# Patient Record
Sex: FEMALE | Race: WHITE | Employment: FULL TIME | URBAN - METROPOLITAN AREA
[De-identification: names, ages, dates, MRNs, and addresses within clinical notes are randomized per-mention and may not be internally consistent; named-entity substitution may affect disease eponyms.]

---

## 2017-05-05 ENCOUNTER — HOSPITAL ENCOUNTER (OUTPATIENT)
Dept: MAMMOGRAPHY | Age: 47
Discharge: HOME | End: 2017-05-05
Attending: FAMILY MEDICINE | Admitting: FAMILY MEDICINE

## 2017-05-05 DIAGNOSIS — Z12.31 VISIT FOR SCREENING MAMMOGRAM: ICD-10-CM

## 2017-12-19 ENCOUNTER — HOSPITAL ENCOUNTER (OUTPATIENT)
Dept: OTHER | Age: 47
Discharge: HOME | End: 2017-12-19

## 2018-09-25 ENCOUNTER — HOSPITAL ENCOUNTER (OUTPATIENT)
Age: 48
Setting detail: SPECIMEN
Discharge: HOME | End: 2018-09-25

## 2018-09-25 PROCEDURE — 86735 MUMPS ANTIBODY: CPT

## 2018-09-25 PROCEDURE — 86787 VARICELLA-ZOSTER ANTIBODY: CPT

## 2018-09-25 PROCEDURE — 86765 RUBEOLA ANTIBODY: CPT

## 2018-09-25 PROCEDURE — 86481 TB AG RESPONSE T-CELL SUSP: CPT

## 2018-09-25 PROCEDURE — 86762 RUBELLA ANTIBODY: CPT

## 2018-09-27 LAB
MUV IGG SER QL: 16.9
RUBELLA ANTIBODY IGG: 16.3
RUBEOLA (MEASLES) AB IGG: 98.5
VARICELLA-ZOSTER VIRUS AB, IGG: 1784

## 2018-09-28 LAB
NIL (NEGATIVE) SPOT CONTROL: NORMAL
PANEL A SPOT COUNT: 0
PANEL B SPOT COUNT: 2
POSITIVE CONTROL SPOT COUNT: NORMAL
TB CELL IMMUNE MEASURE: NEGATIVE

## 2019-03-12 ENCOUNTER — EMPLOYEE WELLNESS (OUTPATIENT)
Dept: OTHER | Age: 49
End: 2019-03-12

## 2019-03-12 LAB
CHOLESTEROL: 202 MG/DL
GLUCOSE BLD-MCNC: 120 MG/DL (ref 70–99)
HDLC SERPL-MCNC: 51 MG/DL
LDL CHOLESTEROL CALCULATED: 133 MG/DL
PATIENT FASTING?: YES
TRIGL SERPL-MCNC: 91 MG/DL

## 2019-03-20 VITALS — WEIGHT: 231 LBS | BODY MASS INDEX: 36.18 KG/M2

## 2019-04-18 ENCOUNTER — OFFICE VISIT (OUTPATIENT)
Dept: FAMILY MEDICINE CLINIC | Age: 49
End: 2019-04-18
Payer: COMMERCIAL

## 2019-04-18 VITALS
RESPIRATION RATE: 14 BRPM | HEART RATE: 50 BPM | DIASTOLIC BLOOD PRESSURE: 74 MMHG | BODY MASS INDEX: 37.93 KG/M2 | WEIGHT: 236 LBS | HEIGHT: 66 IN | SYSTOLIC BLOOD PRESSURE: 112 MMHG

## 2019-04-18 DIAGNOSIS — Z13.220 LIPID SCREENING: ICD-10-CM

## 2019-04-18 DIAGNOSIS — Z11.4 ENCOUNTER FOR SCREENING FOR HIV: ICD-10-CM

## 2019-04-18 DIAGNOSIS — Z12.11 SCREENING FOR COLON CANCER: ICD-10-CM

## 2019-04-18 DIAGNOSIS — Z00.00 ROUTINE HEALTH MAINTENANCE: ICD-10-CM

## 2019-04-18 DIAGNOSIS — Z00.00 ROUTINE HEALTH MAINTENANCE: Primary | ICD-10-CM

## 2019-04-18 DIAGNOSIS — Z23 NEED FOR PROPHYLACTIC VACCINATION AGAINST DIPHTHERIA-TETANUS-PERTUSSIS (DTP): ICD-10-CM

## 2019-04-18 DIAGNOSIS — R73.03 PRE-DIABETES: ICD-10-CM

## 2019-04-18 DIAGNOSIS — I10 ESSENTIAL HYPERTENSION: ICD-10-CM

## 2019-04-18 DIAGNOSIS — Z12.31 ENCOUNTER FOR SCREENING MAMMOGRAM FOR BREAST CANCER: ICD-10-CM

## 2019-04-18 LAB
A/G RATIO: 1.4 (ref 1.1–2.2)
ALBUMIN SERPL-MCNC: 4.2 G/DL (ref 3.4–5)
ALP BLD-CCNC: 131 U/L (ref 40–129)
ALT SERPL-CCNC: 18 U/L (ref 10–40)
ANION GAP SERPL CALCULATED.3IONS-SCNC: 11 MMOL/L (ref 3–16)
AST SERPL-CCNC: 21 U/L (ref 15–37)
BILIRUB SERPL-MCNC: 0.3 MG/DL (ref 0–1)
BUN BLDV-MCNC: 28 MG/DL (ref 7–20)
CALCIUM SERPL-MCNC: 9.8 MG/DL (ref 8.3–10.6)
CHLORIDE BLD-SCNC: 100 MMOL/L (ref 99–110)
CHOLESTEROL, TOTAL: 207 MG/DL (ref 0–199)
CO2: 31 MMOL/L (ref 21–32)
CREAT SERPL-MCNC: 0.8 MG/DL (ref 0.6–1.1)
CREATININE URINE POCT: 100
GFR AFRICAN AMERICAN: >60
GFR NON-AFRICAN AMERICAN: >60
GLOBULIN: 3 G/DL
GLUCOSE BLD-MCNC: 108 MG/DL (ref 70–99)
HBA1C MFR BLD: 5.7 %
HCT VFR BLD CALC: 42.6 % (ref 36–48)
HDLC SERPL-MCNC: 47 MG/DL (ref 40–60)
HEMOGLOBIN: 14.5 G/DL (ref 12–16)
LDL CHOLESTEROL CALCULATED: 106 MG/DL
MCH RBC QN AUTO: 30 PG (ref 26–34)
MCHC RBC AUTO-ENTMCNC: 34.1 G/DL (ref 31–36)
MCV RBC AUTO: 88.2 FL (ref 80–100)
MICROALBUMIN/CREAT 24H UR: 10 MG/G{CREAT}
MICROALBUMIN/CREAT UR-RTO: 30
PDW BLD-RTO: 12.9 % (ref 12.4–15.4)
PLATELET # BLD: 254 K/UL (ref 135–450)
PMV BLD AUTO: 9.4 FL (ref 5–10.5)
POTASSIUM SERPL-SCNC: 4.4 MMOL/L (ref 3.5–5.1)
RBC # BLD: 4.83 M/UL (ref 4–5.2)
SODIUM BLD-SCNC: 142 MMOL/L (ref 136–145)
TOTAL PROTEIN: 7.2 G/DL (ref 6.4–8.2)
TRIGL SERPL-MCNC: 269 MG/DL (ref 0–150)
VLDLC SERPL CALC-MCNC: 54 MG/DL
WBC # BLD: 9.6 K/UL (ref 4–11)

## 2019-04-18 PROCEDURE — 82044 UR ALBUMIN SEMIQUANTITATIVE: CPT | Performed by: NURSE PRACTITIONER

## 2019-04-18 PROCEDURE — 99202 OFFICE O/P NEW SF 15 MIN: CPT | Performed by: NURSE PRACTITIONER

## 2019-04-18 PROCEDURE — 83036 HEMOGLOBIN GLYCOSYLATED A1C: CPT | Performed by: NURSE PRACTITIONER

## 2019-04-18 RX ORDER — ATENOLOL 50 MG/1
50 TABLET ORAL DAILY
Qty: 30 TABLET | Refills: 5 | Status: SHIPPED | OUTPATIENT
Start: 2019-04-18 | End: 2019-07-18 | Stop reason: SINTOL

## 2019-04-18 RX ORDER — HYDROCHLOROTHIAZIDE 50 MG/1
50 TABLET ORAL DAILY
Qty: 30 TABLET | Refills: 5 | Status: SHIPPED | OUTPATIENT
Start: 2019-04-18 | End: 2019-07-18 | Stop reason: SDUPTHER

## 2019-04-18 ASSESSMENT — ENCOUNTER SYMPTOMS
WHEEZING: 0
CHEST TIGHTNESS: 0
CONSTIPATION: 0
COUGH: 0
SHORTNESS OF BREATH: 0
DIARRHEA: 0
ABDOMINAL PAIN: 0
NAUSEA: 0
VOMITING: 0

## 2019-04-18 ASSESSMENT — PATIENT HEALTH QUESTIONNAIRE - PHQ9
2. FEELING DOWN, DEPRESSED OR HOPELESS: 1
SUM OF ALL RESPONSES TO PHQ QUESTIONS 1-9: 2
1. LITTLE INTEREST OR PLEASURE IN DOING THINGS: 1
SUM OF ALL RESPONSES TO PHQ9 QUESTIONS 1 & 2: 2
SUM OF ALL RESPONSES TO PHQ QUESTIONS 1-9: 2

## 2019-04-18 NOTE — PROGRESS NOTES
SUBJECTIVE:    Erick Dias  1970  46 y.o.  female      Chief Complaint   Patient presents with    New Patient     establish care, general wellness, diabetic wants more conversation in general regarding diabetes, no issues, has lost approx. 90lbs over several years    Medication Refill    Diabetes    Discuss Medications     cut atenolol in 1/2; taking 50mg instead of 100mg     HPI    Allergies   Allergen Reactions    Pcn [Penicillins] Hives     Any derivative       Current Outpatient Medications   Medication Sig Dispense Refill    atenolol (TENORMIN) 50 MG tablet Take 1 tablet by mouth daily 30 tablet 5    hydrochlorothiazide (HYDRODIURIL) 50 MG tablet Take 1 tablet by mouth daily 30 tablet 5     No current facility-administered medications for this visit.            Past Medical History:   Diagnosis Date    Diabetes mellitus ([facility]     Graves disease     Hypertension     Hyperthyroidism     Migraine     Type 2 diabetes mellitus without complication ([facility]      Past Surgical History:   Procedure Laterality Date     SECTION      HYSTERECTOMY  2015    bilateral ovaries not removed    HYSTERECTOMY, VAGINAL      LAMINECTOMY      TONSILLECTOMY  1990s     Social History     Socioeconomic History    Marital status:      Spouse name: None    Number of children: None    Years of education: None    Highest education level: None   Occupational History    None   Social Needs    Financial resource strain: None    Food insecurity:     Worry: None     Inability: None    Transportation needs:     Medical: None     Non-medical: None   Tobacco Use    Smoking status: Former Smoker     Packs/day: 1.00     Years: 20.00     Pack years: 20.00     Types: Cigarettes     Last attempt to quit: 2007     Years since quittin.3    Smokeless tobacco: Never Used   Substance and Sexual Activity    Alcohol use: No    Drug use: No    Sexual activity: Yes     Partners: Male   Lifestyle Ear: Hearing, tympanic membrane, external ear and ear canal normal.   Nose: Nose normal.   Mouth/Throat: Uvula is midline, oropharynx is clear and moist and mucous membranes are normal.   Eyes: Pupils are equal, round, and reactive to light. Conjunctivae and EOM are normal.   Neck: Normal range of motion. Neck supple. Cardiovascular: Normal rate, regular rhythm and normal heart sounds. Exam reveals no gallop and no friction rub. No murmur heard. Pulmonary/Chest: Effort normal and breath sounds normal. No respiratory distress. She has no wheezes. She has no rhonchi. She has no rales. Abdominal: Soft. Bowel sounds are normal. She exhibits no distension. There is no tenderness. There is no rigidity and no guarding. Musculoskeletal: Normal range of motion. She exhibits no edema. Lymphadenopathy:     She has no cervical adenopathy. Right cervical: No superficial cervical and no posterior cervical adenopathy present. Left cervical: No superficial cervical and no posterior cervical adenopathy present. Neurological: She is alert and oriented to person, place, and time. No cranial nerve deficit. Skin: Skin is warm and dry. Psychiatric: She has a normal mood and affect. Her behavior is normal.       ASSESSMENT/PLAN:    1. Encounter for screening mammogram for breast cancer  - [facility] Digital Screen Bilateral [[id number]]; Future    2. Screening for colon cancer  - POCT FECAL IMMUNOCHEMICAL TEST (FIT); Future  - Natalia Trujillo MD, General Surgery, [facility]    3. Need for prophylactic vaccination against diphtheria-tetanus-pertussis (DTP)    4. Pre-diabetes  The patient is asked to make an attempt to improve diet and exercise patterns to aid in medical management of this problem. - POCT glycosylated hemoglobin (Hb A1C)  - POCT microalbumin    5. Essential hypertension  Monitor BP periodically at home. Medications refilled  - atenolol (TENORMIN) 50 MG tablet;  Take 1 tablet by mouth daily Dispense: 30 tablet; Refill: 5  - hydrochlorothiazide (HYDRODIURIL) 50 MG tablet; Take 1 tablet by mouth daily  Dispense: 30 tablet; Refill: 5    6. Routine health maintenance  - CBC; Future  - Comprehensive Metabolic Panel; Future    7. Lipid screening  - Lipid Panel; Future    8. Encounter for screening for HIV  - HIV Screen; Future               Return in about 3 months (around 7/18/2019).       (Please note that portions of this note may have been completed with a voice recognition program. Efforts were made to edit the dictations but occasionally words aremis-transcribed.)

## 2019-04-18 NOTE — ASSESSMENT & PLAN NOTE
Last A1c 6.3%. A1c today is 5.7. Denies polyuria, polydipsia, polyphagia. She has lost almost 90 pounds in the last year.

## 2019-04-18 NOTE — ASSESSMENT & PLAN NOTE
Started on medication 9 years ago. She was on a higher dose of atenolol, but heart rate dropping too low. She cut in half and has felt better. She checks BP at home \"when I feel funky\". BP typically in 120s/70-80s.

## 2019-04-19 ENCOUNTER — TELEPHONE (OUTPATIENT)
Dept: BARIATRICS/WEIGHT MGMT | Age: 49
End: 2019-04-19

## 2019-04-19 DIAGNOSIS — R74.8 ALKALINE PHOSPHATASE ELEVATION: ICD-10-CM

## 2019-04-19 DIAGNOSIS — R74.8 ALKALINE PHOSPHATASE ELEVATION: Primary | ICD-10-CM

## 2019-04-19 LAB
HIV AG/AB: NORMAL
HIV ANTIGEN: NORMAL
HIV-1 ANTIBODY: NORMAL
HIV-2 AB: NORMAL

## 2019-04-24 LAB
ALK PHOS OTHER CALC: 0 U/L
ALK PHOSPHATASE: 121 U/L (ref 40–120)
ALKALINE PHOSPHATASE BONE FRACTION: 41 U/L (ref 0–55)
ALKALINE PHOSPHATASE LIVER FRACTION: 80 U/L (ref 0–94)

## 2019-04-25 DIAGNOSIS — R74.8 ELEVATED ALKALINE PHOSPHATASE LEVEL: Primary | ICD-10-CM

## 2019-05-16 DIAGNOSIS — R74.8 ELEVATED ALKALINE PHOSPHATASE LEVEL: ICD-10-CM

## 2019-05-16 DIAGNOSIS — R74.8 ELEVATED ALKALINE PHOSPHATASE LEVEL: Primary | ICD-10-CM

## 2019-05-19 LAB
ALK PHOS OTHER CALC: 0 U/L
ALK PHOSPHATASE: 120 U/L (ref 40–120)
ALKALINE PHOSPHATASE BONE FRACTION: 40 U/L (ref 0–55)
ALKALINE PHOSPHATASE LIVER FRACTION: 80 U/L (ref 0–94)

## 2019-05-20 DIAGNOSIS — Z12.11 SCREENING FOR COLON CANCER: ICD-10-CM

## 2019-05-20 LAB
CONTROL: NORMAL
HEMOCCULT STL QL: NEGATIVE

## 2019-05-20 PROCEDURE — 82274 ASSAY TEST FOR BLOOD FECAL: CPT | Performed by: NURSE PRACTITIONER

## 2019-05-30 ENCOUNTER — NURSE TRIAGE (OUTPATIENT)
Dept: OTHER | Facility: CLINIC | Age: 49
End: 2019-05-30

## 2019-05-30 ENCOUNTER — HOSPITAL ENCOUNTER (EMERGENCY)
Age: 49
Discharge: ANOTHER HOSPITAL | End: 2019-05-30
Attending: EMERGENCY MEDICINE
Payer: COMMERCIAL

## 2019-05-30 ENCOUNTER — APPOINTMENT (OUTPATIENT)
Dept: CT IMAGING | Age: 49
End: 2019-05-30
Payer: COMMERCIAL

## 2019-05-30 VITALS
HEIGHT: 66 IN | SYSTOLIC BLOOD PRESSURE: 119 MMHG | RESPIRATION RATE: 18 BRPM | BODY MASS INDEX: 36.96 KG/M2 | WEIGHT: 230 LBS | HEART RATE: 52 BPM | TEMPERATURE: 97.2 F | OXYGEN SATURATION: 100 % | DIASTOLIC BLOOD PRESSURE: 69 MMHG

## 2019-05-30 DIAGNOSIS — R10.9 ABDOMINAL PAIN, UNSPECIFIED ABDOMINAL LOCATION: ICD-10-CM

## 2019-05-30 DIAGNOSIS — K35.80 ACUTE APPENDICITIS, UNSPECIFIED ACUTE APPENDICITIS TYPE: Primary | ICD-10-CM

## 2019-05-30 LAB
ALBUMIN SERPL-MCNC: 4 GM/DL (ref 3.4–5)
ALP BLD-CCNC: 143 IU/L (ref 40–129)
ALT SERPL-CCNC: 6 U/L (ref 10–40)
ANION GAP SERPL CALCULATED.3IONS-SCNC: 20 MMOL/L (ref 4–16)
AST SERPL-CCNC: 17 IU/L (ref 15–37)
BACTERIA: ABNORMAL /HPF
BASOPHILS ABSOLUTE: 0.1 K/CU MM
BASOPHILS RELATIVE PERCENT: 0.4 % (ref 0–1)
BILIRUB SERPL-MCNC: 0.3 MG/DL (ref 0–1)
BILIRUBIN URINE: NEGATIVE MG/DL
BLOOD, URINE: NEGATIVE
BUN BLDV-MCNC: 22 MG/DL (ref 6–23)
CALCIUM SERPL-MCNC: 9.4 MG/DL (ref 8.3–10.6)
CAST TYPE: ABNORMAL /HPF
CHLORIDE BLD-SCNC: 103 MMOL/L (ref 99–110)
CLARITY: CLEAR
CO2: 20 MMOL/L (ref 21–32)
COLOR: YELLOW
CREAT SERPL-MCNC: 0.7 MG/DL (ref 0.6–1.1)
CRYSTAL TYPE: ABNORMAL /HPF
DIFFERENTIAL TYPE: ABNORMAL
EOSINOPHILS ABSOLUTE: 0.6 K/CU MM
EOSINOPHILS RELATIVE PERCENT: 4.4 % (ref 0–3)
EPITHELIAL CELLS, UA: ABNORMAL /HPF
GFR AFRICAN AMERICAN: >60 ML/MIN/1.73M2
GFR NON-AFRICAN AMERICAN: >60 ML/MIN/1.73M2
GLUCOSE BLD-MCNC: 98 MG/DL (ref 70–99)
GLUCOSE, URINE: NEGATIVE MG/DL
HCT VFR BLD CALC: 45.5 % (ref 37–47)
HEMOGLOBIN: 14.9 GM/DL (ref 12.5–16)
IMMATURE NEUTROPHIL %: 0.2 % (ref 0–0.43)
KETONES, URINE: NEGATIVE MG/DL
LEUKOCYTE ESTERASE, URINE: NEGATIVE
LIPASE: 27 IU/L (ref 13–60)
LYMPHOCYTES ABSOLUTE: 3.8 K/CU MM
LYMPHOCYTES RELATIVE PERCENT: 27.8 % (ref 24–44)
MCH RBC QN AUTO: 29.4 PG (ref 27–31)
MCHC RBC AUTO-ENTMCNC: 32.7 % (ref 32–36)
MCV RBC AUTO: 89.9 FL (ref 78–100)
MONOCYTES ABSOLUTE: 1 K/CU MM
MONOCYTES RELATIVE PERCENT: 7.6 % (ref 0–4)
MUCUS: NEGATIVE HPF
NITRITE URINE, QUANTITATIVE: NEGATIVE
PDW BLD-RTO: 12.2 % (ref 11.7–14.9)
PH, URINE: 6 (ref 5–8)
PLATELET # BLD: 236 K/CU MM (ref 140–440)
PMV BLD AUTO: 9.9 FL (ref 7.5–11.1)
POTASSIUM SERPL-SCNC: 4.9 MMOL/L (ref 3.5–5.1)
PROTEIN UA: NEGATIVE MG/DL
RBC # BLD: 5.06 M/CU MM (ref 4.2–5.4)
RBC URINE: ABNORMAL /HPF (ref 0–6)
SEGMENTED NEUTROPHILS ABSOLUTE COUNT: 8.1 K/CU MM
SEGMENTED NEUTROPHILS RELATIVE PERCENT: 59.6 % (ref 36–66)
SODIUM BLD-SCNC: 143 MMOL/L (ref 135–145)
SPECIFIC GRAVITY UA: 1.01 (ref 1–1.03)
TOTAL IMMATURE NEUTOROPHIL: 0.03 K/CU MM
TOTAL PROTEIN: 7.2 GM/DL (ref 6.4–8.2)
UROBILINOGEN, URINE: 0.2 MG/DL (ref 0.2–1)
VOLUME, (UVOL): 12 ML (ref 10–12)
WBC # BLD: 13.6 K/CU MM (ref 4–10.5)
WBC UA: ABNORMAL /HPF (ref 0–5)

## 2019-05-30 PROCEDURE — 2500000003 HC RX 250 WO HCPCS: Performed by: EMERGENCY MEDICINE

## 2019-05-30 PROCEDURE — 6360000002 HC RX W HCPCS: Performed by: EMERGENCY MEDICINE

## 2019-05-30 PROCEDURE — 83690 ASSAY OF LIPASE: CPT

## 2019-05-30 PROCEDURE — 85025 COMPLETE CBC W/AUTO DIFF WBC: CPT

## 2019-05-30 PROCEDURE — 96368 THER/DIAG CONCURRENT INF: CPT

## 2019-05-30 PROCEDURE — 80053 COMPREHEN METABOLIC PANEL: CPT

## 2019-05-30 PROCEDURE — 96365 THER/PROPH/DIAG IV INF INIT: CPT

## 2019-05-30 PROCEDURE — 6360000004 HC RX CONTRAST MEDICATION: Performed by: EMERGENCY MEDICINE

## 2019-05-30 PROCEDURE — 96376 TX/PRO/DX INJ SAME DRUG ADON: CPT

## 2019-05-30 PROCEDURE — 81001 URINALYSIS AUTO W/SCOPE: CPT

## 2019-05-30 PROCEDURE — 96375 TX/PRO/DX INJ NEW DRUG ADDON: CPT

## 2019-05-30 PROCEDURE — 74177 CT ABD & PELVIS W/CONTRAST: CPT

## 2019-05-30 PROCEDURE — 99285 EMERGENCY DEPT VISIT HI MDM: CPT

## 2019-05-30 RX ORDER — MORPHINE SULFATE 4 MG/ML
4 INJECTION, SOLUTION INTRAMUSCULAR; INTRAVENOUS EVERY 30 MIN PRN
Status: DISCONTINUED | OUTPATIENT
Start: 2019-05-30 | End: 2019-05-31 | Stop reason: HOSPADM

## 2019-05-30 RX ORDER — CIPROFLOXACIN 2 MG/ML
400 INJECTION, SOLUTION INTRAVENOUS ONCE
Status: COMPLETED | OUTPATIENT
Start: 2019-05-30 | End: 2019-05-30

## 2019-05-30 RX ORDER — ONDANSETRON 2 MG/ML
4 INJECTION INTRAMUSCULAR; INTRAVENOUS EVERY 30 MIN PRN
Status: DISCONTINUED | OUTPATIENT
Start: 2019-05-30 | End: 2019-05-31 | Stop reason: HOSPADM

## 2019-05-30 RX ORDER — LEVOFLOXACIN 5 MG/ML
750 INJECTION, SOLUTION INTRAVENOUS ONCE
Status: DISCONTINUED | OUTPATIENT
Start: 2019-05-30 | End: 2019-05-30

## 2019-05-30 RX ORDER — METOCLOPRAMIDE HYDROCHLORIDE 5 MG/ML
10 INJECTION INTRAMUSCULAR; INTRAVENOUS ONCE
Status: COMPLETED | OUTPATIENT
Start: 2019-05-30 | End: 2019-05-30

## 2019-05-30 RX ADMIN — CIPROFLOXACIN 400 MG: 2 INJECTION, SOLUTION INTRAVENOUS at 21:55

## 2019-05-30 RX ADMIN — ONDANSETRON 4 MG: 2 INJECTION INTRAMUSCULAR; INTRAVENOUS at 22:45

## 2019-05-30 RX ADMIN — IOPAMIDOL 100 ML: 755 INJECTION, SOLUTION INTRAVENOUS at 20:42

## 2019-05-30 RX ADMIN — METRONIDAZOLE 500 MG: 500 INJECTION, SOLUTION INTRAVENOUS at 21:43

## 2019-05-30 RX ADMIN — ONDANSETRON 4 MG: 2 INJECTION INTRAMUSCULAR; INTRAVENOUS at 21:43

## 2019-05-30 RX ADMIN — METOCLOPRAMIDE 10 MG: 5 INJECTION, SOLUTION INTRAMUSCULAR; INTRAVENOUS at 23:13

## 2019-05-30 RX ADMIN — MORPHINE SULFATE 4 MG: 4 INJECTION INTRAVENOUS at 21:43

## 2019-05-30 ASSESSMENT — PAIN DESCRIPTION - DESCRIPTORS: DESCRIPTORS: ACHING

## 2019-05-30 ASSESSMENT — PAIN DESCRIPTION - PAIN TYPE: TYPE: ACUTE PAIN

## 2019-05-30 ASSESSMENT — PAIN DESCRIPTION - FREQUENCY: FREQUENCY: INTERMITTENT

## 2019-05-30 ASSESSMENT — PAIN SCALES - GENERAL: PAINLEVEL_OUTOF10: 5

## 2019-05-30 ASSESSMENT — PAIN DESCRIPTION - ORIENTATION: ORIENTATION: RIGHT;LOWER

## 2019-05-30 ASSESSMENT — PAIN DESCRIPTION - LOCATION: LOCATION: ABDOMEN

## 2019-05-30 NOTE — ED PROVIDER NOTES
EMERGENCY DEPARTMENT H&P    Patient Name:  Marlon Gale  :  1970  MRN:  [id number]  Date of Visit:  2019    Triage Chief Complaint:   Abdominal Pain (right lower abdominal pain that started on Tuesday has gotten worse was sent from Urgent Care)    HPI:  Marlon Gale is a 46 y.o. female who presents for c/o 3 day h/o constant RLQ pain. She reports pain started gradually after she lifted some boxes at work that day but pain has been ongoing. Pain is rated at 4/10 and does not radiate. Pain was increased by deep breathing earlier today. Has tried taking ibuprofen which has not changed her pain. Denies any trauma to her abdomen. Has never had this pain before. Went to an urgenct care today where provider was concerned for appendicitis and sent pt to ED. Pt currently denies any other associated symptoms such as F/C, N/V/D, constipation, dysuria, hematuria, increased frequency, vag bleeding or discharge, flank pain, back pain, LOC, JEFFRY, CP or any other associated symptoms. ROS:  Review of Systems  Ten point ROS was performed and is negative except as stated in HPI above. Review of systems obtained from the patient.      Past Medical History:   Diagnosis Date    Diabetes mellitus ([address]     Graves disease     Hypertension     Hyperthyroidism     Migraine     Type 2 diabetes mellitus without complication ([address]      Past Surgical History:   Procedure Laterality Date     SECTION      HYSTERECTOMY  2015    bilateral ovaries not removed    HYSTERECTOMY, VAGINAL      LAMINECTOMY      TONSILLECTOMY  1990s     Family History   Problem Relation Age of Onset    Other Mother         thyroid issues    High Blood Pressure Mother     Heart Disease Father     Other Maternal Grandmother         brain shunt; hydrocephalus    Heart Attack Maternal Grandfather     Alzheimer's Disease Paternal Grandmother     Heart Attack Paternal [place] Stroke Paternal Grandfather Social History     Socioeconomic History    Marital status:      Spouse name: Not on file    Number of children: Not on file    Years of education: Not on file    Highest education level: Not on file   Occupational History    Not on file   Social Needs    Financial resource strain: Not on file    Food insecurity:     Worry: Not on file     Inability: Not on file    Transportation needs:     Medical: Not on file     Non-medical: Not on file   Tobacco Use    Smoking status: Former Smoker     Packs/day: 1.00     Years: 20.00     Pack years: 20.00     Types: Cigarettes     Last attempt to quit:      Years since quittin.4    Smokeless tobacco: Never Used   Substance and Sexual Activity    Alcohol use: No    Drug use: No    Sexual activity: Yes     Partners: Male   Lifestyle    Physical activity:     Days per week: Not on file     Minutes per session: Not on file    Stress: Not on file   Relationships    Social connections:     Talks on phone: Not on file     Gets together: Not on file     Attends Congregation service: Not on file     Active member of club or organization: Not on file     Attends meetings of clubs or organizations: Not on file     Relationship status: Not on file    Intimate partner violence:     Fear of current or ex partner: Not on file     Emotionally abused: Not on file     Physically abused: Not on file     Forced sexual activity: Not on file   Other Topics Concern    Not on file   Social History Narrative    Not on file     No current facility-administered medications for this encounter.       Current Outpatient Medications   Medication Sig Dispense Refill    atenolol (TENORMIN) 50 MG tablet Take 1 tablet by mouth daily 30 tablet 5    hydrochlorothiazide (HYDRODIURIL) 50 MG tablet Take 1 tablet by mouth daily 30 tablet 5     Allergies   Allergen Reactions    Pcn [Penicillins] Hives     Any derivative       Physical Exam:  ED TRIAGE VITALS  BP: (!) 156/74, Temp: 97.2 °F (36.2 °C), Pulse: 52, Resp: 18, SpO2: 97 %    GENERAL: Appears stated age, awake and alert, no acute distress, non-toxic appearing  HEENT: NC / AT. Oropharynx unremarkable. MMM. Normal sclera / conjunctiva. NECK: Trachea midline. No overt adenopathy or masses. CARDIOVASCULAR: RRR. Normal s1/s2. No murmur. Peripheral pulses and perfusion intact. No LE edema noted. RESPIRATORY: Lungs clear to auscultation bilaterally. No respiratory distress or accessory muscle usage. ABDOMEN: Soft, RLQ tenderness to palpation noted focally at McBurney's point. Positive Rovsing sign noted. Non-distended, no guarding / rebound. SKIN: Warm. Dry. Intact. No obvious skin abnormalities noted. MUSCULOSKELETAL: No swelling or deformities noted. No visible overt ROM restriction noted. BACK: no paraspinal / CVA / mid-line vertebral / flank tenderness noted. NEURO: The patient is awake, alert, interactive. Follows commands and answers questions appropriately. Speech is fluent with intact cognition.     I have reviewed and interpreted all of the currently available lab results from this visit:  Results for orders placed or performed during the hospital encounter of 05/30/19   Urinalysis (Lab)   Result Value Ref Range    Color, UA YELLOW YELLOW    Clarity, UA CLEAR CLEAR    Glucose, Urine NEGATIVE NEGATIVE MG/DL    Bilirubin Urine NEGATIVE NEGATIVE MG/DL    Ketones, Urine NEGATIVE NEGATIVE MG/DL    Specific Gravity, UA 1.006 1.001 - 1.035    Blood, Urine NEGATIVE NEGATIVE    pH, Urine 6.0 5.0 - 8.0    Protein, UA NEGATIVE NEGATIVE MG/DL    Urobilinogen, Urine 0.2 0.2 - 1.0 MG/DL    Nitrite Urine, Quantitative NEGATIVE NEGATIVE    Leukocyte Esterase, Urine NEGATIVE NEGATIVE    Volume, (UVOL) 12 10 - 12 ML    RBC, UA NO CELLS SEEN 0 - 6 /HPF    WBC, UA 0 TO 1 0 - 5 /HPF    Epi Cells 0 TO 1  SQUAMOUS   /HPF    Cast Type NO CAST FORMS SEEN NO CAST FORMS SEEN /HPF    Bacteria, UA RARE (A) NEGATIVE /HPF    Crystal Type NONE SEEN NEGATIVE /HPF    Mucus, UA NEGATIVE NEGATIVE HPF   CBC Auto Differential   Result Value Ref Range    WBC 13.6 (H) 4.0 - 10.5 K/CU MM    RBC 5.06 4.2 - 5.4 M/CU MM    Hemoglobin 14.9 12.5 - 16.0 GM/DL    Hematocrit 45.5 37 - 47 %    MCV 89.9 78 - 100 FL    MCH 29.4 27 - 31 PG    MCHC 32.7 32.0 - 36.0 %    RDW 12.2 11.7 - 14.9 %    Platelets 924 813 - 015 K/CU MM    MPV 9.9 7.5 - 11.1 FL    Differential Type AUTOMATED DIFFERENTIAL     Segs Relative 59.6 36 - 66 %    Lymphocytes % 27.8 24 - 44 %    Monocytes % 7.6 (H) 0 - 4 %    Eosinophils % 4.4 (H) 0 - 3 %    Basophils % 0.4 0 - 1 %    Segs Absolute 8.1 K/CU MM    Lymphocytes # 3.8 K/CU MM    Monocytes # 1.0 K/CU MM    Eosinophils # 0.6 K/CU MM    Basophils # 0.1 K/CU MM    Immature Neutrophil % 0.2 0 - 0.43 %    Total Immature Neutrophil 0.03 K/CU MM   Comprehensive Metabolic Panel w/ Reflex to MG   Result Value Ref Range    Sodium 143 135 - 145 MMOL/L    Potassium 4.9 3.5 - 5.1 MMOL/L    Chloride 103 99 - 110 mMol/L    CO2 20 (L) 21 - 32 MMOL/L    BUN 22 6 - 23 MG/DL    CREATININE 0.7 0.6 - 1.1 MG/DL    Glucose 98 70 - 99 MG/DL    Calcium 9.4 8.3 - 10.6 MG/DL    Alb 4.0 3.4 - 5.0 GM/DL    Total Protein 7.2 6.4 - 8.2 GM/DL    Total Bilirubin 0.3 0.0 - 1.0 MG/DL    ALT 6 (L) 10 - 40 U/L    AST 17 15 - 37 IU/L    Alkaline Phosphatase 143 (H) 40 - 129 IU/L    GFR Non-African American >60 >60 mL/min/1.73m2    GFR African American >60 >60 mL/min/1.73m2    Anion Gap 20 (H) 4 - 16   Lipase   Result Value Ref Range    Lipase 27 13 - 60 IU/L        Radiographs:  Radiologist's Report Reviewed:  CT ABDOMEN PELVIS W IV CONTRAST Additional Contrast? None   Final Result   1. Appendicitis. No periappendiceal abscess identified   2. Normal appearing gallbladder   3.  No obstructive uropathy               Medications administered:  Medications   morphine sulfate (PF) injection 4 mg (has no administration in time range)   metronidazole (FLAGYL) 500 mg in NaCl 100 mL IVPB premix (has no administration in time range)   ondansetron (ZOFRAN) injection 4 mg (has no administration in time range)   ciprofloxacin (CIPRO) IVPB 400 mg (has no administration in time range)   iopamidol (ISOVUE-370) 76 % injection 100 mL (100 mLs Intravenous Given 5/30/19 2042)     ED COURSE & MDM:  Nursing notes and vital signs were reviewed. The patient's medical record and available pertinent information was also reviewed if available. Pt presents with stable VS. Please see history and exam above. Workup initiated as above. Patient was offered pain medication however declined this and any other symptomatic treatment for the time being    Labwork reviewed, notable for leukocytosis at 13.6. Urinalysis is unremarkable at this time. CMP stable. Lipase within normal limits. CT imaging the abdomen and pelvis does reveal signs of acute appendicitis with no other acute complications noted. General surgery was paged and IV antibiotics initiated. Patient did request pain medication at this point, morphine ordered. Zofran to prevent nausea. I discussed patient's case to transfer center with general surgeon on-call Dr. Catarina Gonzales. She recommends changing the IV Levaquin to IV ciprofloxacin and is in agreement with Flagyl. She recommends transfer to [facility] where she will admit the patient primarily to her service and will take the patient is a wall are either tonight or tomorrow morning. We will keep the patient NPO for the time being. Transport services arranged. Clinical Impression:  1. Acute appendicitis, unspecified acute appendicitis type    2. Abdominal pain, unspecified abdominal location      Comment: Please note this report has been produced using speech recognition software and may contain errors related to that system including errors in grammar, punctuation, and spelling, as well as words and phrases that may be inappropriate.  If there are any questions or concerns please feel free to contact the dictating provider for clarification.     Electronically Signed:        Roger Sheikh,   05/30/19 8260

## 2019-05-30 NOTE — TELEPHONE ENCOUNTER
Reason for Disposition   Caller has already spoken with the PCP and has no further questions. Protocols used: NO CONTACT OR DUPLICATE CONTACT CALL-ADULT-    Caller was triaged earlier and advised to go to the urgent care for abdominal pain. [facility] urgent care in [place] examined patient and referred to the ER for further treatment.

## 2019-05-30 NOTE — TELEPHONE ENCOUNTER
Reason for Disposition   MODERATE OR MILD pain that comes and goes (cramps) lasts > 24 hours    Protocols used: ABDOMINAL PAIN - FEMALE-ADULT-[place]    Pt calls with c/o abdominal pain . It initially started this past Tuesday and she thought she had either a hernia or pulled muscle. But now , it hurts when she takes a deep breath, in the right lower side of her abdomen. Pt does not see a protrusion, but states she has lots loose skin d/t loosing 90 pounds. Pt had BM today, she was constipated. No vomiting , never had these symptoms before. Denies fever. Moving certain ways and bending over increases the pain, the pain is not constant. Caller with symptoms as documented above. Caller informed of disposition. Pt assisted with UCs around her area via [employer] website. Care advice as documented.

## 2019-05-31 ENCOUNTER — ANESTHESIA EVENT (OUTPATIENT)
Dept: OPERATING ROOM | Age: 49
End: 2019-05-31
Payer: COMMERCIAL

## 2019-05-31 ENCOUNTER — HOSPITAL ENCOUNTER (OUTPATIENT)
Age: 49
Setting detail: OBSERVATION
Discharge: HOME | End: 2019-06-01
Attending: SURGERY | Admitting: SURGERY
Payer: COMMERCIAL

## 2019-05-31 ENCOUNTER — ANESTHESIA (OUTPATIENT)
Dept: OPERATING ROOM | Age: 49
End: 2019-05-31
Payer: COMMERCIAL

## 2019-05-31 VITALS
SYSTOLIC BLOOD PRESSURE: 168 MMHG | OXYGEN SATURATION: 98 % | TEMPERATURE: 97.7 F | RESPIRATION RATE: 14 BRPM | DIASTOLIC BLOOD PRESSURE: 81 MMHG

## 2019-05-31 DIAGNOSIS — K35.30 ACUTE APPENDICITIS WITH LOCALIZED PERITONITIS, WITHOUT PERFORATION, ABSCESS, OR GANGRENE: Primary | ICD-10-CM

## 2019-05-31 LAB — GLUCOSE BLD-MCNC: 131 MG/DL (ref 70–99)

## 2019-05-31 PROCEDURE — 6360000002 HC RX W HCPCS: Performed by: NURSE ANESTHETIST, CERTIFIED REGISTERED

## 2019-05-31 PROCEDURE — 2500000003 HC RX 250 WO HCPCS: Performed by: NURSE ANESTHETIST, CERTIFIED REGISTERED

## 2019-05-31 PROCEDURE — 3600000004 HC SURGERY LEVEL 4 BASE: Performed by: SURGERY

## 2019-05-31 PROCEDURE — 6370000000 HC RX 637 (ALT 250 FOR IP): Performed by: SURGERY

## 2019-05-31 PROCEDURE — 6360000002 HC RX W HCPCS: Performed by: SURGERY

## 2019-05-31 PROCEDURE — 2500000003 HC RX 250 WO HCPCS: Performed by: SURGERY

## 2019-05-31 PROCEDURE — 44970 LAPAROSCOPY APPENDECTOMY: CPT | Performed by: SURGERY

## 2019-05-31 PROCEDURE — 7100000000 HC PACU RECOVERY - FIRST 15 MIN: Performed by: SURGERY

## 2019-05-31 PROCEDURE — 7100000001 HC PACU RECOVERY - ADDTL 15 MIN: Performed by: SURGERY

## 2019-05-31 PROCEDURE — 2580000003 HC RX 258: Performed by: SURGERY

## 2019-05-31 PROCEDURE — 2709999900 HC NON-CHARGEABLE SUPPLY: Performed by: SURGERY

## 2019-05-31 PROCEDURE — 3700000001 HC ADD 15 MINUTES (ANESTHESIA): Performed by: SURGERY

## 2019-05-31 PROCEDURE — G0378 HOSPITAL OBSERVATION PER HR: HCPCS

## 2019-05-31 PROCEDURE — 6360000002 HC RX W HCPCS: Performed by: ANESTHESIOLOGY

## 2019-05-31 PROCEDURE — 3600000014 HC SURGERY LEVEL 4 ADDTL 15MIN: Performed by: SURGERY

## 2019-05-31 PROCEDURE — 82962 GLUCOSE BLOOD TEST: CPT

## 2019-05-31 PROCEDURE — 2580000003 HC RX 258: Performed by: NURSE ANESTHETIST, CERTIFIED REGISTERED

## 2019-05-31 PROCEDURE — G0379 DIRECT REFER HOSPITAL OBSERV: HCPCS

## 2019-05-31 PROCEDURE — 88304 TISSUE EXAM BY PATHOLOGIST: CPT

## 2019-05-31 PROCEDURE — 3700000000 HC ANESTHESIA ATTENDED CARE: Performed by: SURGERY

## 2019-05-31 PROCEDURE — 2720000010 HC SURG SUPPLY STERILE: Performed by: SURGERY

## 2019-05-31 RX ORDER — HYDROCODONE BITARTRATE AND ACETAMINOPHEN 5; 325 MG/1; MG/1
1 TABLET ORAL EVERY 4 HOURS PRN
Status: DISCONTINUED | OUTPATIENT
Start: 2019-05-31 | End: 2019-06-01 | Stop reason: HOSPADM

## 2019-05-31 RX ORDER — PROPOFOL 10 MG/ML
INJECTION, EMULSION INTRAVENOUS PRN
Status: DISCONTINUED | OUTPATIENT
Start: 2019-05-31 | End: 2019-05-31 | Stop reason: SDUPTHER

## 2019-05-31 RX ORDER — ONDANSETRON 2 MG/ML
4 INJECTION INTRAMUSCULAR; INTRAVENOUS EVERY 6 HOURS PRN
Status: DISCONTINUED | OUTPATIENT
Start: 2019-05-31 | End: 2019-05-31

## 2019-05-31 RX ORDER — MORPHINE SULFATE 4 MG/ML
0.5 INJECTION, SOLUTION INTRAMUSCULAR; INTRAVENOUS
Status: DISCONTINUED | OUTPATIENT
Start: 2019-05-31 | End: 2019-06-01 | Stop reason: HOSPADM

## 2019-05-31 RX ORDER — ONDANSETRON 2 MG/ML
INJECTION INTRAMUSCULAR; INTRAVENOUS PRN
Status: DISCONTINUED | OUTPATIENT
Start: 2019-05-31 | End: 2019-05-31 | Stop reason: SDUPTHER

## 2019-05-31 RX ORDER — LABETALOL HYDROCHLORIDE 5 MG/ML
5 INJECTION, SOLUTION INTRAVENOUS EVERY 10 MIN PRN
Status: DISCONTINUED | OUTPATIENT
Start: 2019-05-31 | End: 2019-05-31 | Stop reason: HOSPADM

## 2019-05-31 RX ORDER — FENTANYL CITRATE 50 UG/ML
25 INJECTION, SOLUTION INTRAMUSCULAR; INTRAVENOUS EVERY 5 MIN PRN
Status: DISCONTINUED | OUTPATIENT
Start: 2019-05-31 | End: 2019-05-31 | Stop reason: HOSPADM

## 2019-05-31 RX ORDER — DEXTROSE, SODIUM CHLORIDE, AND POTASSIUM CHLORIDE 5; .45; .15 G/100ML; G/100ML; G/100ML
INJECTION INTRAVENOUS CONTINUOUS
Status: DISCONTINUED | OUTPATIENT
Start: 2019-05-31 | End: 2019-05-31

## 2019-05-31 RX ORDER — RANITIDINE 150 MG/1
150 CAPSULE ORAL NIGHTLY
Status: DISCONTINUED | OUTPATIENT
Start: 2019-05-31 | End: 2019-05-31 | Stop reason: CLARIF

## 2019-05-31 RX ORDER — HYDROCHLOROTHIAZIDE 25 MG/1
50 TABLET ORAL DAILY
Status: DISCONTINUED | OUTPATIENT
Start: 2019-05-31 | End: 2019-06-01 | Stop reason: HOSPADM

## 2019-05-31 RX ORDER — MORPHINE SULFATE 4 MG/ML
1 INJECTION, SOLUTION INTRAMUSCULAR; INTRAVENOUS
Status: DISCONTINUED | OUTPATIENT
Start: 2019-05-31 | End: 2019-06-01 | Stop reason: HOSPADM

## 2019-05-31 RX ORDER — CIPROFLOXACIN 2 MG/ML
400 INJECTION, SOLUTION INTRAVENOUS ONCE
Status: COMPLETED | OUTPATIENT
Start: 2019-05-31 | End: 2019-05-31

## 2019-05-31 RX ORDER — DEXTROSE, SODIUM CHLORIDE, AND POTASSIUM CHLORIDE 5; .45; .15 G/100ML; G/100ML; G/100ML
1000 INJECTION INTRAVENOUS CONTINUOUS
Status: DISCONTINUED | OUTPATIENT
Start: 2019-05-31 | End: 2019-06-01 | Stop reason: HOSPADM

## 2019-05-31 RX ORDER — BUPIVACAINE HYDROCHLORIDE 5 MG/ML
INJECTION, SOLUTION EPIDURAL; INTRACAUDAL
Status: COMPLETED | OUTPATIENT
Start: 2019-05-31 | End: 2019-05-31

## 2019-05-31 RX ORDER — HYDROCODONE BITARTRATE AND ACETAMINOPHEN 5; 325 MG/1; MG/1
2 TABLET ORAL EVERY 4 HOURS PRN
Status: DISCONTINUED | OUTPATIENT
Start: 2019-05-31 | End: 2019-06-01 | Stop reason: HOSPADM

## 2019-05-31 RX ORDER — KETOROLAC TROMETHAMINE 30 MG/ML
INJECTION, SOLUTION INTRAMUSCULAR; INTRAVENOUS PRN
Status: DISCONTINUED | OUTPATIENT
Start: 2019-05-31 | End: 2019-05-31 | Stop reason: SDUPTHER

## 2019-05-31 RX ORDER — ROCURONIUM BROMIDE 10 MG/ML
INJECTION, SOLUTION INTRAVENOUS PRN
Status: DISCONTINUED | OUTPATIENT
Start: 2019-05-31 | End: 2019-05-31 | Stop reason: SDUPTHER

## 2019-05-31 RX ORDER — ONDANSETRON 2 MG/ML
4 INJECTION INTRAMUSCULAR; INTRAVENOUS
Status: COMPLETED | OUTPATIENT
Start: 2019-05-31 | End: 2019-05-31

## 2019-05-31 RX ORDER — SODIUM CHLORIDE, SODIUM LACTATE, POTASSIUM CHLORIDE, CALCIUM CHLORIDE 600; 310; 30; 20 MG/100ML; MG/100ML; MG/100ML; MG/100ML
INJECTION, SOLUTION INTRAVENOUS CONTINUOUS PRN
Status: DISCONTINUED | OUTPATIENT
Start: 2019-05-31 | End: 2019-05-31 | Stop reason: SDUPTHER

## 2019-05-31 RX ORDER — HYDROMORPHONE HCL 110MG/55ML
0.5 PATIENT CONTROLLED ANALGESIA SYRINGE INTRAVENOUS EVERY 5 MIN PRN
Status: DISCONTINUED | OUTPATIENT
Start: 2019-05-31 | End: 2019-05-31 | Stop reason: HOSPADM

## 2019-05-31 RX ORDER — ONDANSETRON 2 MG/ML
4 INJECTION INTRAMUSCULAR; INTRAVENOUS EVERY 6 HOURS PRN
Status: DISCONTINUED | OUTPATIENT
Start: 2019-05-31 | End: 2019-06-01 | Stop reason: HOSPADM

## 2019-05-31 RX ORDER — FENTANYL CITRATE 50 UG/ML
INJECTION, SOLUTION INTRAMUSCULAR; INTRAVENOUS PRN
Status: DISCONTINUED | OUTPATIENT
Start: 2019-05-31 | End: 2019-05-31 | Stop reason: SDUPTHER

## 2019-05-31 RX ORDER — MAGNESIUM HYDROXIDE 1200 MG/15ML
LIQUID ORAL CONTINUOUS PRN
Status: COMPLETED | OUTPATIENT
Start: 2019-05-31 | End: 2019-05-31

## 2019-05-31 RX ORDER — HYDRALAZINE HYDROCHLORIDE 20 MG/ML
5 INJECTION INTRAMUSCULAR; INTRAVENOUS EVERY 10 MIN PRN
Status: DISCONTINUED | OUTPATIENT
Start: 2019-05-31 | End: 2019-05-31 | Stop reason: HOSPADM

## 2019-05-31 RX ORDER — ATENOLOL 50 MG/1
50 TABLET ORAL DAILY
Status: DISCONTINUED | OUTPATIENT
Start: 2019-05-31 | End: 2019-06-01 | Stop reason: HOSPADM

## 2019-05-31 RX ORDER — SODIUM CHLORIDE, SODIUM LACTATE, POTASSIUM CHLORIDE, CALCIUM CHLORIDE 600; 310; 30; 20 MG/100ML; MG/100ML; MG/100ML; MG/100ML
INJECTION, SOLUTION INTRAVENOUS
Status: COMPLETED
Start: 2019-05-31 | End: 2019-05-31

## 2019-05-31 RX ORDER — FAMOTIDINE 20 MG/1
20 TABLET, FILM COATED ORAL NIGHTLY
Status: DISCONTINUED | OUTPATIENT
Start: 2019-05-31 | End: 2019-06-01 | Stop reason: HOSPADM

## 2019-05-31 RX ORDER — DEXAMETHASONE SODIUM PHOSPHATE 4 MG/ML
INJECTION, SOLUTION INTRA-ARTICULAR; INTRALESIONAL; INTRAMUSCULAR; INTRAVENOUS; SOFT TISSUE PRN
Status: DISCONTINUED | OUTPATIENT
Start: 2019-05-31 | End: 2019-05-31 | Stop reason: SDUPTHER

## 2019-05-31 RX ORDER — CIPROFLOXACIN 2 MG/ML
400 INJECTION, SOLUTION INTRAVENOUS ONCE
Status: DISCONTINUED | OUTPATIENT
Start: 2019-05-31 | End: 2019-05-31

## 2019-05-31 RX ORDER — LIDOCAINE HYDROCHLORIDE 20 MG/ML
INJECTION, SOLUTION INTRAVENOUS PRN
Status: DISCONTINUED | OUTPATIENT
Start: 2019-05-31 | End: 2019-05-31 | Stop reason: SDUPTHER

## 2019-05-31 RX ADMIN — SUGAMMADEX 200 MG: 100 INJECTION, SOLUTION INTRAVENOUS at 08:32

## 2019-05-31 RX ADMIN — FENTANYL CITRATE 100 MCG: 50 INJECTION INTRAMUSCULAR; INTRAVENOUS at 07:41

## 2019-05-31 RX ADMIN — POTASSIUM CHLORIDE, DEXTROSE MONOHYDRATE AND SODIUM CHLORIDE: 150; 5; 450 INJECTION, SOLUTION INTRAVENOUS at 02:30

## 2019-05-31 RX ADMIN — POTASSIUM CHLORIDE, DEXTROSE MONOHYDRATE AND SODIUM CHLORIDE 1000 ML: 150; 5; 450 INJECTION, SOLUTION INTRAVENOUS at 17:18

## 2019-05-31 RX ADMIN — SODIUM CHLORIDE, POTASSIUM CHLORIDE, SODIUM LACTATE AND CALCIUM CHLORIDE: 600; 310; 30; 20 INJECTION, SOLUTION INTRAVENOUS at 07:32

## 2019-05-31 RX ADMIN — FENTANYL CITRATE 25 MCG: 50 INJECTION INTRAMUSCULAR; INTRAVENOUS at 09:15

## 2019-05-31 RX ADMIN — HYDROCODONE BITARTRATE AND ACETAMINOPHEN 1 TABLET: 5; 325 TABLET ORAL at 13:18

## 2019-05-31 RX ADMIN — FAMOTIDINE 20 MG: 20 TABLET, FILM COATED ORAL at 19:49

## 2019-05-31 RX ADMIN — KETOROLAC TROMETHAMINE 30 MG: 30 INJECTION, SOLUTION INTRAMUSCULAR; INTRAVENOUS at 08:42

## 2019-05-31 RX ADMIN — HYDROCODONE BITARTRATE AND ACETAMINOPHEN 2 TABLET: 5; 325 TABLET ORAL at 21:52

## 2019-05-31 RX ADMIN — ROCURONIUM BROMIDE 10 MG: 10 INJECTION INTRAVENOUS at 08:00

## 2019-05-31 RX ADMIN — LIDOCAINE HYDROCHLORIDE 100 MG: 20 INJECTION, SOLUTION INTRAVENOUS at 07:42

## 2019-05-31 RX ADMIN — FENTANYL CITRATE 50 MCG: 50 INJECTION INTRAMUSCULAR; INTRAVENOUS at 08:08

## 2019-05-31 RX ADMIN — CIPROFLOXACIN 400 MG: 2 INJECTION, SOLUTION INTRAVENOUS at 07:50

## 2019-05-31 RX ADMIN — ONDANSETRON 4 MG: 2 INJECTION INTRAMUSCULAR; INTRAVENOUS at 08:08

## 2019-05-31 RX ADMIN — ONDANSETRON 4 MG: 2 INJECTION INTRAMUSCULAR; INTRAVENOUS at 09:55

## 2019-05-31 RX ADMIN — DEXAMETHASONE SODIUM PHOSPHATE 4 MG: 4 INJECTION, SOLUTION INTRAMUSCULAR; INTRAVENOUS at 07:48

## 2019-05-31 RX ADMIN — FENTANYL CITRATE 50 MCG: 50 INJECTION INTRAMUSCULAR; INTRAVENOUS at 08:49

## 2019-05-31 RX ADMIN — POTASSIUM CHLORIDE, DEXTROSE MONOHYDRATE AND SODIUM CHLORIDE 1000 ML: 150; 5; 450 INJECTION, SOLUTION INTRAVENOUS at 09:19

## 2019-05-31 RX ADMIN — PROPOFOL 200 MG: 10 INJECTION, EMULSION INTRAVENOUS at 07:42

## 2019-05-31 RX ADMIN — ONDANSETRON 4 MG: 2 INJECTION INTRAMUSCULAR; INTRAVENOUS at 09:12

## 2019-05-31 RX ADMIN — METRONIDAZOLE 1000 MG: 500 INJECTION, SOLUTION INTRAVENOUS at 07:51

## 2019-05-31 RX ADMIN — ROCURONIUM BROMIDE 40 MG: 10 INJECTION INTRAVENOUS at 07:42

## 2019-05-31 ASSESSMENT — PAIN DESCRIPTION - DESCRIPTORS
DESCRIPTORS: ACHING
DESCRIPTORS: DISCOMFORT

## 2019-05-31 ASSESSMENT — PULMONARY FUNCTION TESTS
PIF_VALUE: 26
PIF_VALUE: 17
PIF_VALUE: 17
PIF_VALUE: 26
PIF_VALUE: 16
PIF_VALUE: 19
PIF_VALUE: 2
PIF_VALUE: 26
PIF_VALUE: 19
PIF_VALUE: 0
PIF_VALUE: 17
PIF_VALUE: 26
PIF_VALUE: 0
PIF_VALUE: 13
PIF_VALUE: 23
PIF_VALUE: 2
PIF_VALUE: 19
PIF_VALUE: 25
PIF_VALUE: 26
PIF_VALUE: 17
PIF_VALUE: 17
PIF_VALUE: 18
PIF_VALUE: 17
PIF_VALUE: 20
PIF_VALUE: 25
PIF_VALUE: 17
PIF_VALUE: 0
PIF_VALUE: 2
PIF_VALUE: 25
PIF_VALUE: 17
PIF_VALUE: 17
PIF_VALUE: 22
PIF_VALUE: 15
PIF_VALUE: 1
PIF_VALUE: 19
PIF_VALUE: 26
PIF_VALUE: 1
PIF_VALUE: 26
PIF_VALUE: 1
PIF_VALUE: 15
PIF_VALUE: 23
PIF_VALUE: 26
PIF_VALUE: 17
PIF_VALUE: 26
PIF_VALUE: 16
PIF_VALUE: 14
PIF_VALUE: 26
PIF_VALUE: 0
PIF_VALUE: 26
PIF_VALUE: 23
PIF_VALUE: 18
PIF_VALUE: 0
PIF_VALUE: 26
PIF_VALUE: 25
PIF_VALUE: 24
PIF_VALUE: 27
PIF_VALUE: 26
PIF_VALUE: 17
PIF_VALUE: 17
PIF_VALUE: 25
PIF_VALUE: 26
PIF_VALUE: 17
PIF_VALUE: 24
PIF_VALUE: 2
PIF_VALUE: 21
PIF_VALUE: 0
PIF_VALUE: 8
PIF_VALUE: 26

## 2019-05-31 ASSESSMENT — PAIN SCALES - GENERAL
PAINLEVEL_OUTOF10: 3
PAINLEVEL_OUTOF10: 3
PAINLEVEL_OUTOF10: 0
PAINLEVEL_OUTOF10: 2
PAINLEVEL_OUTOF10: 7
PAINLEVEL_OUTOF10: 0
PAINLEVEL_OUTOF10: 5

## 2019-05-31 ASSESSMENT — PAIN DESCRIPTION - ORIENTATION
ORIENTATION: RIGHT;LOWER
ORIENTATION: RIGHT;MID

## 2019-05-31 ASSESSMENT — PAIN DESCRIPTION - LOCATION
LOCATION: ABDOMEN
LOCATION: ABDOMEN
LOCATION: ABDOMEN;SHOULDER

## 2019-05-31 ASSESSMENT — PAIN DESCRIPTION - FREQUENCY: FREQUENCY: INTERMITTENT

## 2019-05-31 ASSESSMENT — PAIN DESCRIPTION - PAIN TYPE
TYPE: SURGICAL PAIN
TYPE: ACUTE PAIN

## 2019-05-31 NOTE — ANESTHESIA PRE PROCEDURE
Department of Anesthesiology  Preprocedure Note       Name:  Handy Luz   Age:  46 y.o.  :  1970                                          MRN:  [id number]         Date:  2019      Surgeon: Dale Jenkins):  Mary Montana MD    Procedure: APPENDECTOMY LAPAROSCOPIC (N/A Abdomen)    Medications prior to admission:   Prior to Admission medications    Medication Sig Start Date End Date Taking? Authorizing Provider   atenolol (TENORMIN) 50 MG tablet Take 1 tablet by mouth daily 19   Alfred Santo APRN - CNP   hydrochlorothiazide (HYDRODIURIL) 50 MG tablet Take 1 tablet by mouth daily 19   Lenell Purple, APRN - CNP       Current medications:    Current Facility-Administered Medications   Medication Dose Route Frequency Provider Last Rate Last Dose    ondansetron (ZOFRAN) injection 4 mg  4 mg Intravenous Q6H PRN Mary Montana MD        morphine sulfate (PF) injection 1 mg  1 mg Intravenous Q2H PRN Mary Montana MD        dextrose 5 % and 0.45 % NaCl with KCl 20 mEq infusion   Intravenous Continuous Mary Montana  mL/hr at 19 0230      ciprofloxacin (CIPRO) IVPB 400 mg  400 mg Intravenous Once Mary Montana MD        metronidazole (FLAGYL) 1,000 mg IVPB  1,000 mg Intravenous Once Mary Montana MD           Allergies:     Allergies   Allergen Reactions    Pcn [Penicillins] Hives     Any derivative       Problem List:    Patient Active Problem List   Diagnosis Code    Finger contusion S60.00XA    Closed fracture of phalanx or phalanges of hand S62.609A    Pre-diabetes R73.03    Essential hypertension I10    Acute appendicitis K35.80       Past Medical History:        Diagnosis Date    [facility]     Graves disease     Hypertension     Hyperthyroidism     Migraine     Type 2 diabetes mellitus without complication ([facility] [facility]        Past Surgical History:        Procedure Laterality Date     SECTION     HYSTERECTOMY  2015    bilateral ovaries not removed    HYSTERECTOMY, VAGINAL      LAMINECTOMY  1997    TONSILLECTOMY         Social History:    Social History     Tobacco Use    Smoking status: Former Smoker     Packs/day: 1.00     Years: 20.00     Pack years: 20.00     Types: Cigarettes     Last attempt to quit: 2007     Years since quittin.4    Smokeless tobacco: Never Used   Substance Use Topics    Alcohol use: No                                Counseling given: Not Answered      Vital Signs (Current):   Vitals:    19 0045 19 0515   BP: (!) 117/56 (!) 104/57   Pulse: 55 (!) 47   Resp:  18   Temp: 36.8 °C (98.2 °F) 36.8 °C (98.2 °F)   TempSrc: Oral Oral   SpO2:  97%   Weight: 230 lb (104.3 kg)    Height: 5' 6\" (1.676 m)                                               BP Readings from Last 3 Encounters:   19 (!) 104/57   19 119/69   19 112/74       NPO Status: Time of last liquid consumption: 163                        Time of last solid consumption: 163                        Date of last liquid consumption: 19                        Date of last solid food consumption: 19    BMI:   Wt Readings from Last 3 Encounters:   19 230 lb (104.3 kg)   19 230 lb (104.3 kg)   19 236 lb (107 kg)     Body mass index is 37.12 kg/m².     CBC:   Lab Results   Component Value Date    WBC 13.6 2019    RBC 5.06 2019    HGB 14.9 2019    HCT 45.5 2019    MCV 89.9 2019    RDW 12.2 2019     2019       CMP:   Lab Results   Component Value Date     2019    K 4.9 2019     2019    CO2 20 2019    BUN 22 2019    CREATININE 0.7 2019    GFRAA >60 2019    AGRATIO 1.4 2019    LABGLOM >60 2019    GLUCOSE 98 2019    PROT 7.2 2019    CALCIUM 9.4 2019    BILITOT 0.3 2019    ALKPHOS 143 2019    AST 17 2019    ALT

## 2019-05-31 NOTE — PROGRESS NOTES
3805- arrived to PACU in supine position, monitors applied alarms on oriented to unit. Handoff report received from [address]  0404- c/o nausea meds given  0920- turned and repositioned tolerated well  0930- phase 1 recovery complete, handoff report called to [facility], transferred to [address] per bed

## 2019-05-31 NOTE — H&P
Mita Nicholson MD H&P    PATIENT NAME: Amirah Granda   YOB: 1970    ADMISSION DATE: 2019. TODAY'S DATE: 2019    CHIEF COMPLAINT:  Abdominal pain      HISTORY OF PRESENT ILLNESS:  The patient is a 46 y.o. female  who presents with right lower quadrant abdominal pain that began late Tuesday. The pain worsened and she came to the ER in [place]. A CT showed acute appendicitis and she was transferred here for a lap appy. Past Medical History:        Diagnosis Date    Diabetes mellitus ([facility]     Graves disease     Hypertension     Hyperthyroidism     Migraine     Type 2 diabetes mellitus without complication ([facility]        Past Surgical History:        Procedure Laterality Date     SECTION      HYSTERECTOMY  2015    bilateral ovaries not removed    HYSTERECTOMY, VAGINAL      LAMINECTOMY      TONSILLECTOMY         Medications Prior to Admission:   Medications Prior to Admission: atenolol (TENORMIN) 50 MG tablet, Take 1 tablet by mouth daily  hydrochlorothiazide (HYDRODIURIL) 50 MG tablet, Take 1 tablet by mouth daily    Allergies:  Pcn [penicillins]    Social History:   TOBACCO:   reports that she quit smoking about 12 years ago. Her smoking use included cigarettes. She has a 20.00 pack-year smoking history. She has never used smokeless tobacco.  ETOH:   reports that she does not drink alcohol. DRUGS:   reports that she does not use drugs.   MARITAL STATUS:    OCCUPATION:  Employed   Patient currently lives with family     Family History:       Problem Relation Age of Onset    Other Mother         thyroid issues    High Blood Pressure Mother     Heart Disease Father     Other Maternal Grandmother         brain shunt; hydrocephalus    Heart Attack Maternal Grandfather     Alzheimer's Disease Paternal Grandmother     Heart Attack Paternal Grandfather     Stroke Paternal Grandfather        REVIEW OF SYSTEMS:    CONSTITUTIONAL:  negative for fevers and chills  HEENT:  negative  CARDIOVASCULAR:  negative  GASTROINTESTINAL:  positive for abdominal pain  GENITOURINARY:  negative  HEMATOLOGIC/LYMPHATIC:  negative  ENDOCRINE:  negative    PHYSICAL EXAM:    VITALS:  BP (!) 104/57   Pulse (!) 47   Temp 98.2 °F (36.8 °C) (Oral)   Resp 18   Ht 5' 6\" (1.676 m)   Wt 230 lb (104.3 kg)   SpO2 97%   BMI 37.12 kg/m²   CONSTITUTIONAL:  awake, alert, mild distress and mildly obese  ENT:  normocepalic, without obvious abnormality  NECK:  supple, symmetrical, trachea midline and no carotid bruits  LUNGS:  clear to auscultation  CARDIOVASCULAR:  regular rate and rhythm   GASTROINTESTINAL;  scars noted , normal bowel sounds, soft, non-distended, tenderness noted in the right lower quadrant, involuntary guarding present, no masses palpated and hernia absent  MUSCULOSKELETAL:  0+ pitting edema lower extremities  NEUROLOGIC:  Mental Status Exam:  Level of Alertness:   awake  Orientation:   person, place, time      DATA:  CBC:   Lab Results   Component Value Date    WBC 13.6 05/30/2019    RBC 5.06 05/30/2019    HGB 14.9 05/30/2019    HCT 45.5 05/30/2019    MCV 89.9 05/30/2019    MCH 29.4 05/30/2019    MCHC 32.7 05/30/2019    RDW 12.2 05/30/2019     05/30/2019    MPV 9.9 05/30/2019     CMP:    Lab Results   Component Value Date     05/30/2019    K 4.9 05/30/2019     05/30/2019    CO2 20 05/30/2019    BUN 22 05/30/2019    CREATININE 0.7 05/30/2019    GFRAA >60 05/30/2019    AGRATIO 1.4 04/18/2019    LABGLOM >60 05/30/2019    GLUCOSE 98 05/30/2019    PROT 7.2 05/30/2019    LABALBU 4.0 05/30/2019    CALCIUM 9.4 05/30/2019    BILITOT 0.3 05/30/2019    ALKPHOS 143 05/30/2019    AST 17 05/30/2019    ALT 6 05/30/2019       ASSESSMENT AND PLAN:Acute appendicitis. Will plan a lap appy under anesthesia. The risks, benefits and alternatives to the planned procedure were discussed. Patient expressed an understanding and is willing to proceed.       Luis Fernando Taylor

## 2019-05-31 NOTE — OP NOTE
GENERAL SURGERY OPERATIVE REPORT    Yuly Slater                          5/31/2019    PREOPERATIVE DIAGNOSIS:Acute appendicitis    POSTOP DIAGNOSIS:Acute non perforated appendicitis    PROCEDURE:Laprascopic appendectomy    PROCEDURE DETAILS: Patient was brought to the operating room and placed supine on the operating table. After induction of general endotracheal anesthesia, the patients abdomen was prepped and draped in the usual fashion. A supraumbilical incision was made and taken down through the skin and subcutaneous tissue. The visiport was used to enter the abdomen and it was inflated to 15 lb CO2. The suprapubic and middle trocars were inserted under direct vision. The appendix was located in the right colic gutter. The base of the appendix was transected with the OMAR stapler. The mesoappendix was transected with the ligasure. The appendix was placed in an extraction bag and brought out through the middle incision. The abdomen was irrigated free of all blood and cloudy fluid. At the end of the procedure there was no bleeding or leakage of stool. The trocars were removed under direct vision and as much CO2 as possible was allowed to escape. The incisions were close with staples and anesthetized with 0.5% marcaine plain. FINDINGS:Acute non perforated appendicitis    EBL:20 cc    FLUID:600 cc    COMP:None    COND:Stable in PACU    Melina Martínez.

## 2019-06-01 VITALS
HEIGHT: 66 IN | SYSTOLIC BLOOD PRESSURE: 116 MMHG | DIASTOLIC BLOOD PRESSURE: 59 MMHG | HEART RATE: 47 BPM | RESPIRATION RATE: 17 BRPM | WEIGHT: 230 LBS | TEMPERATURE: 97.9 F | BODY MASS INDEX: 36.96 KG/M2 | OXYGEN SATURATION: 97 %

## 2019-06-01 PROCEDURE — G0378 HOSPITAL OBSERVATION PER HR: HCPCS

## 2019-06-01 PROCEDURE — 6370000000 HC RX 637 (ALT 250 FOR IP): Performed by: SURGERY

## 2019-06-01 PROCEDURE — 99024 POSTOP FOLLOW-UP VISIT: CPT | Performed by: NURSE PRACTITIONER

## 2019-06-01 PROCEDURE — 2500000003 HC RX 250 WO HCPCS: Performed by: SURGERY

## 2019-06-01 RX ORDER — HYDROCODONE BITARTRATE AND ACETAMINOPHEN 5; 325 MG/1; MG/1
1 TABLET ORAL EVERY 6 HOURS PRN
Qty: 20 TABLET | Refills: 0 | Status: SHIPPED | OUTPATIENT
Start: 2019-06-01 | End: 2019-06-06

## 2019-06-01 RX ADMIN — HYDROCHLOROTHIAZIDE 50 MG: 25 TABLET ORAL at 09:11

## 2019-06-01 RX ADMIN — ATENOLOL 50 MG: 50 TABLET ORAL at 09:11

## 2019-06-01 RX ADMIN — POTASSIUM CHLORIDE, DEXTROSE MONOHYDRATE AND SODIUM CHLORIDE 1000 ML: 150; 5; 450 INJECTION, SOLUTION INTRAVENOUS at 03:06

## 2019-06-01 RX ADMIN — HYDROCODONE BITARTRATE AND ACETAMINOPHEN 2 TABLET: 5; 325 TABLET ORAL at 12:48

## 2019-06-01 ASSESSMENT — PAIN DESCRIPTION - LOCATION: LOCATION: SHOULDER

## 2019-06-01 ASSESSMENT — PAIN DESCRIPTION - ONSET: ONSET: SUDDEN

## 2019-06-01 ASSESSMENT — PAIN SCALES - GENERAL
PAINLEVEL_OUTOF10: 0
PAINLEVEL_OUTOF10: 8
PAINLEVEL_OUTOF10: 0

## 2019-06-01 ASSESSMENT — PAIN DESCRIPTION - PROGRESSION: CLINICAL_PROGRESSION: RAPIDLY WORSENING

## 2019-06-01 ASSESSMENT — PAIN DESCRIPTION - ORIENTATION: ORIENTATION: RIGHT

## 2019-06-01 ASSESSMENT — PAIN DESCRIPTION - FREQUENCY: FREQUENCY: INTERMITTENT

## 2019-06-01 ASSESSMENT — PAIN DESCRIPTION - DESCRIPTORS: DESCRIPTORS: ACHING

## 2019-06-01 ASSESSMENT — PAIN DESCRIPTION - PAIN TYPE: TYPE: ACUTE PAIN

## 2019-06-01 ASSESSMENT — PAIN - FUNCTIONAL ASSESSMENT: PAIN_FUNCTIONAL_ASSESSMENT: ACTIVITIES ARE NOT PREVENTED

## 2019-06-01 NOTE — ANESTHESIA POSTPROCEDURE EVALUATION
Department of Anesthesiology  Postprocedure Note    Patient: Neli Sebastian  MRN: [id number]  YOB: 1970  Date of evaluation: 5/31/2019  Time:  9:24 PM     Procedure Summary     Date:  05/31/19 Room / Location:  [place] [address] 03 / [facility]    Anesthesia Start:  [address] Anesthesia Stop:  Deniz Sack    Procedure:  APPENDECTOMY LAPAROSCOPIC (N/A Abdomen) Diagnosis:  (acute appendicitis)    Surgeon:  Irene Richmond MD Responsible Provider:  Holly Shen MD    Anesthesia Type:  general ASA Status:  3 - Emergent          Anesthesia Type: general    Caity Phase I: Caity Score: 9    Caity Phase II:      Last vitals: Reviewed and per EMR flowsheets.        Anesthesia Post Evaluation    Patient location during evaluation: PACU  Patient participation: complete - patient participated  Level of consciousness: awake and alert  Airway patency: patent  Nausea & Vomiting: no nausea  Complications: no  Cardiovascular status: hemodynamically stable  Respiratory status: acceptable  Hydration status: euvolemic

## 2019-06-01 NOTE — PROGRESS NOTES
Post Operative Progress Note    Pt Name: Corine Britt  Medical Record Number: [id number]  Date of Birth 1970   Today's Date: 6/1/2019    Assessment and Plan:  Corine Britt is a 46 y.o. female who is POD # 1 status post laparoscopic appendectomy     1. Doing well post op  2. Abdomen soft, mild tenderness  3. Does have some shoulder gas - likely due to pneumo from surgery  4. Tolerating diet  5. Pain managed - will send home with po pain medication    Chief complaint: Abdominal pain    Patient was stable overnight. Chart reviewed. The patient feels better. Pain is well controlled with current medications. Past, Family, Social History unchanged from admission.     Objective:  Vitals: BP (!) 121/58   Pulse (!) 48   Temp 96 °F (35.6 °C) (Oral)   Resp 16   Ht 5' 6\" (1.676 m)   Wt 230 lb (104.3 kg)   SpO2 98%   BMI 37.12 kg/m²   24 hour intake/output:  No intake or output data in the 24 hours ending 06/01/19 1029    Physical Exam:  General appearance - alert, well appearing, and in no distress  Chest - clear to auscultation, no wheezes, rales or rhonchi, symmetric air entry  Cardiovascular - normal rate and regular rhythm  Abdomen - soft, mild tenderness, bowel sounds active  Incision -  healing well, no significant drainage, no dehiscence, no significant erythema  Neurological - Alert and oriented, Normal speech and No focal findings or movement disorder noted  Integumentary - Skin color, texture, turgor normal. No Rashes or lesions  Musculoskeletal -Full ROM times 4 extremities, No peripheral edema, No evidence of DVT seen on physical exam          ___________________________________________    SCOOTER Sy-CNP  6/1/2019  10:29 AM

## 2019-06-03 ENCOUNTER — TELEPHONE (OUTPATIENT)
Dept: FAMILY MEDICINE CLINIC | Age: 49
End: 2019-06-03

## 2019-06-05 ENCOUNTER — OFFICE VISIT (OUTPATIENT)
Dept: FAMILY MEDICINE CLINIC | Age: 49
End: 2019-06-05
Payer: COMMERCIAL

## 2019-06-05 VITALS
HEART RATE: 50 BPM | BODY MASS INDEX: 37.77 KG/M2 | WEIGHT: 234 LBS | DIASTOLIC BLOOD PRESSURE: 76 MMHG | RESPIRATION RATE: 16 BRPM | SYSTOLIC BLOOD PRESSURE: 118 MMHG

## 2019-06-05 DIAGNOSIS — R23.4 PEELING SKIN: ICD-10-CM

## 2019-06-05 DIAGNOSIS — Z12.31 ENCOUNTER FOR SCREENING MAMMOGRAM FOR BREAST CANCER: ICD-10-CM

## 2019-06-05 DIAGNOSIS — K35.80 ACUTE APPENDICITIS, UNSPECIFIED ACUTE APPENDICITIS TYPE: Primary | ICD-10-CM

## 2019-06-05 DIAGNOSIS — L30.9 DERMATITIS: ICD-10-CM

## 2019-06-05 PROCEDURE — 99214 OFFICE O/P EST MOD 30 MIN: CPT | Performed by: NURSE PRACTITIONER

## 2019-06-05 PROCEDURE — 1111F DSCHRG MED/CURRENT MED MERGE: CPT | Performed by: NURSE PRACTITIONER

## 2019-06-05 RX ORDER — TRIAMCINOLONE ACETONIDE 0.25 MG/G
CREAM TOPICAL
Qty: 1 TUBE | Refills: 0 | Status: SHIPPED | OUTPATIENT
Start: 2019-06-05 | End: 2019-07-18 | Stop reason: CLARIF

## 2019-06-05 ASSESSMENT — ENCOUNTER SYMPTOMS
VOMITING: 0
SHORTNESS OF BREATH: 0
CHEST TIGHTNESS: 0
COUGH: 0
ABDOMINAL PAIN: 1
CONSTIPATION: 0
NAUSEA: 0
DIARRHEA: 0
WHEEZING: 0

## 2019-06-05 NOTE — PROGRESS NOTES
Post-Discharge Transitional Care Management Services or Hospital Follow Up      Amirah Granda   YOB: 1970    Date of Office Visit:  6/5/2019  Date of Hospital Admission: 5/31/19  Date of Hospital Discharge: 6/1/19  Readmission Risk Score(high >=14%. Medium >=10%):Readmission Risk Score: 0      Care management risk score Rising risk (score 2-5) and Complex Care (Scores >=6): 1     Non face to face  following discharge, date last encounter closed (first attempt may have been earlier): 6/3/2019  1:00 PM 6/3/2019  1:00 PM    Call initiated 2 business days of discharge: Yes     Patient Active Problem List   Diagnosis    Finger contusion    Closed fracture of phalanx or phalanges of hand    Pre-diabetes    Essential hypertension    Acute appendicitis       Allergies   Allergen Reactions    Pcn [Penicillins] Hives     Any derivative       Medications listed as ordered at the time of discharge from hospital   Silvana Baeza \"Sadia\"   Home Medication Instructions [facility]:    Printed on:06/05/19 8967   Medication Information                      atenolol (TENORMIN) 50 MG tablet  Take 1 tablet by mouth daily             hydrochlorothiazide (HYDRODIURIL) 50 MG tablet  Take 1 tablet by mouth daily             HYDROcodone-acetaminophen (NORCO) 5-325 MG per tablet  Take 1 tablet by mouth every 6 hours as needed for Pain for up to 5 days. Intended supply: 7 days. Take lowest dose possible to manage pain             triamcinolone (KENALOG) 0.025 % cream  Apply Topically twice daily for one week to affected areas. Medications marked \"taking\" at this time  Outpatient Medications Marked as Taking for the 6/5/19 encounter (Office Visit) with SCOOTER Bailey CNP   Medication Sig Dispense Refill    triamcinolone (KENALOG) 0.025 % cream Apply Topically twice daily for one week to affected areas.  1 Tube 0    HYDROcodone-acetaminophen (NORCO) 5-325 MG per tablet Take 1 tablet by mouth every 6 (tendernss along incisions). Negative for constipation, diarrhea, nausea and vomiting. Musculoskeletal: Negative for arthralgias. Neurological: Negative for weakness, numbness and headaches. Hematological: Negative for adenopathy. Vitals:    06/05/19 0838   BP: 118/76   Site: Left Upper Arm   Position: Sitting   Cuff Size: Large Adult   Pulse: 50   Resp: 16   Weight: 234 lb (106.1 kg)     Body mass index is 37.77 kg/m². Wt Readings from Last 3 Encounters:   06/05/19 234 lb (106.1 kg)   05/31/19 230 lb (104.3 kg)   05/30/19 230 lb (104.3 kg)     BP Readings from Last 3 Encounters:   06/05/19 118/76   06/01/19 (!) 116/59   05/31/19 (!) 168/81       Physical Exam   Constitutional: She is oriented to person, place, and time. She appears well-developed and well-nourished. HENT:   Head: Normocephalic and atraumatic. Neck: Normal range of motion. Neck supple. Cardiovascular: Normal rate, regular rhythm and normal heart sounds. Exam reveals no gallop and no friction rub. No murmur heard. Pulmonary/Chest: Effort normal and breath sounds normal. No stridor. No respiratory distress. She has no wheezes. She has no rales. She exhibits no tenderness. Abdominal: Soft. Bowel sounds are normal. She exhibits no distension and no mass. There is tenderness (along incisions). There is no rebound and no guarding. Musculoskeletal: Normal range of motion. Neurological: She is alert and oriented to person, place, and time. Skin: Skin is warm and dry. 3 laparoscopic incisions to abdomen with staples intact. No drainage, swelling, surrounding redness. Psychiatric: She has a normal mood and affect. Her behavior is normal.           Assessment/Plan:  1. Encounter for screening mammogram for breast cancer  - [facility] Digital Screen Bilateral [[id number]]; Future    2. Acute appendicitis, unspecified acute appendicitis type  Continue stool softener PRN. norco PRN for severe pain. Keep incisions clean and dry.  Follow up

## 2019-06-11 ENCOUNTER — OFFICE VISIT (OUTPATIENT)
Dept: SURGERY | Age: 49
End: 2019-06-11

## 2019-06-11 VITALS — DIASTOLIC BLOOD PRESSURE: 88 MMHG | SYSTOLIC BLOOD PRESSURE: 128 MMHG | OXYGEN SATURATION: 99 % | HEART RATE: 48 BPM

## 2019-06-11 DIAGNOSIS — Z09 POSTOP CHECK: Primary | ICD-10-CM

## 2019-06-11 PROCEDURE — 99024 POSTOP FOLLOW-UP VISIT: CPT | Performed by: NURSE PRACTITIONER

## 2019-06-11 NOTE — PROGRESS NOTES
Nadia Quivers is 10 days post-op: laparoscopic appendectomy. Presenting for routine follow-up. S:  Doing well. Patient has noted no excessive redness, swelling, pain and discharge. O:  Wound healing well. A:  Satisfactory course. P: Staples removed. Patient to return in 2 weeks. Patient is to return as needed for redness, swelling, discomfort, or any concern about her surgery.

## 2019-06-24 NOTE — PROGRESS NOTES
Zainab Courser is 3 weeks post-op: shakira pineda Presenting for routine follow-up. S:  Doing well. Patient has noted no excessive redness, swelling, pain and discharge. O:  Wound healing well. A:  Satisfactory course. P: Patient is to return as needed for redness, swelling, discomfort, or any concern about her  surgery.

## 2019-06-25 ENCOUNTER — OFFICE VISIT (OUTPATIENT)
Dept: SURGERY | Age: 49
End: 2019-06-25

## 2019-06-25 VITALS — DIASTOLIC BLOOD PRESSURE: 80 MMHG | HEART RATE: 67 BPM | OXYGEN SATURATION: 99 % | SYSTOLIC BLOOD PRESSURE: 108 MMHG

## 2019-06-25 DIAGNOSIS — Z09 POSTOP CHECK: Primary | ICD-10-CM

## 2019-06-25 PROCEDURE — 99024 POSTOP FOLLOW-UP VISIT: CPT | Performed by: NURSE PRACTITIONER

## 2019-07-11 ENCOUNTER — HOSPITAL ENCOUNTER (OUTPATIENT)
Dept: MAMMOGRAPHY | Age: 49
Discharge: HOME | End: 2019-07-11
Payer: COMMERCIAL

## 2019-07-11 DIAGNOSIS — Z12.31 ENCOUNTER FOR SCREENING MAMMOGRAM FOR BREAST CANCER: ICD-10-CM

## 2019-07-11 PROCEDURE — 77067 SCR MAMMO BI INCL CAD: CPT

## 2019-07-18 ENCOUNTER — OFFICE VISIT (OUTPATIENT)
Dept: FAMILY MEDICINE CLINIC | Age: 49
End: 2019-07-18
Payer: COMMERCIAL

## 2019-07-18 VITALS
RESPIRATION RATE: 12 BRPM | HEIGHT: 66 IN | SYSTOLIC BLOOD PRESSURE: 114 MMHG | DIASTOLIC BLOOD PRESSURE: 62 MMHG | WEIGHT: 238.4 LBS | BODY MASS INDEX: 38.31 KG/M2 | HEART RATE: 50 BPM | OXYGEN SATURATION: 99 %

## 2019-07-18 DIAGNOSIS — F41.9 ANXIETY: ICD-10-CM

## 2019-07-18 DIAGNOSIS — R73.03 PRE-DIABETES: ICD-10-CM

## 2019-07-18 DIAGNOSIS — I10 ESSENTIAL HYPERTENSION: ICD-10-CM

## 2019-07-18 DIAGNOSIS — R00.1 BRADYCARDIA: Primary | ICD-10-CM

## 2019-07-18 PROCEDURE — 99214 OFFICE O/P EST MOD 30 MIN: CPT | Performed by: NURSE PRACTITIONER

## 2019-07-18 RX ORDER — VENLAFAXINE HYDROCHLORIDE 75 MG/1
75 CAPSULE, EXTENDED RELEASE ORAL DAILY
Qty: 30 CAPSULE | Refills: 1 | Status: SHIPPED | OUTPATIENT
Start: 2019-07-25 | End: 2019-09-16 | Stop reason: SDUPTHER

## 2019-07-18 RX ORDER — HYDROCHLOROTHIAZIDE 50 MG/1
50 TABLET ORAL DAILY
Qty: 90 TABLET | Refills: 3 | Status: SHIPPED | OUTPATIENT
Start: 2019-07-18 | End: 2020-04-01 | Stop reason: SDUPTHER

## 2019-07-18 RX ORDER — VENLAFAXINE HYDROCHLORIDE 37.5 MG/1
37.5 CAPSULE, EXTENDED RELEASE ORAL DAILY
Qty: 7 CAPSULE | Refills: 0 | Status: SHIPPED | OUTPATIENT
Start: 2019-07-18 | End: 2019-08-08 | Stop reason: CLARIF

## 2019-07-18 ASSESSMENT — ANXIETY QUESTIONNAIRES
2. NOT BEING ABLE TO STOP OR CONTROL WORRYING: 1-SEVERAL DAYS
7. FEELING AFRAID AS IF SOMETHING AWFUL MIGHT HAPPEN: 0-NOT AT ALL SURE
1. FEELING NERVOUS, ANXIOUS, OR ON EDGE: 3-NEARLY EVERY DAY
3. WORRYING TOO MUCH ABOUT DIFFERENT THINGS: 3-NEARLY EVERY DAY
4. TROUBLE RELAXING: 2-OVER HALF THE DAYS
6. BECOMING EASILY ANNOYED OR IRRITABLE: 1-SEVERAL DAYS
GAD7 TOTAL SCORE: 11
5. BEING SO RESTLESS THAT IT IS HARD TO SIT STILL: 1-SEVERAL DAYS

## 2019-07-18 ASSESSMENT — ENCOUNTER SYMPTOMS
COUGH: 0
DIARRHEA: 0
SHORTNESS OF BREATH: 0
CONSTIPATION: 0
VOMITING: 0
NAUSEA: 1
WHEEZING: 0
CHEST TIGHTNESS: 0
ABDOMINAL PAIN: 0

## 2019-07-18 ASSESSMENT — PATIENT HEALTH QUESTIONNAIRE - PHQ9
2. FEELING DOWN, DEPRESSED OR HOPELESS: 1
1. LITTLE INTEREST OR PLEASURE IN DOING THINGS: 1
SUM OF ALL RESPONSES TO PHQ QUESTIONS 1-9: 2
SUM OF ALL RESPONSES TO PHQ9 QUESTIONS 1 & 2: 2
SUM OF ALL RESPONSES TO PHQ QUESTIONS 1-9: 2

## 2019-07-18 NOTE — PROGRESS NOTES
SUBJECTIVE:    Padmini Feeling  1970  46 y.o.  female      Chief Complaint   Patient presents with    3 Month Follow-Up     HPI    Allergies   Allergen Reactions    Pcn [Penicillins] Hives     Any derivative       Current Outpatient Medications   Medication Sig Dispense Refill    venlafaxine (EFFEXOR XR) 37.5 MG extended release capsule Take 1 capsule by mouth daily 7 capsule 0    [START ON 2019] venlafaxine (EFFEXOR XR) 75 MG extended release capsule Take 1 capsule by mouth daily 30 capsule 1    hydrochlorothiazide (HYDRODIURIL) 50 MG tablet Take 1 tablet by mouth daily 90 tablet 3     No current facility-administered medications for this visit.            Past Medical History:   Diagnosis Date    [facility]     Graves disease     Hypertension     Hyperthyroidism     Migraine     Type 2 diabetes mellitus without complication ([facility]      Past Surgical History:   Procedure Laterality Date     SECTION      HYSTERECTOMY  2015    bilateral ovaries not removed    HYSTERECTOMY, VAGINAL      LAMINECTOMY      LAPAROSCOPIC APPENDECTOMY N/A 2019    APPENDECTOMY LAPAROSCOPIC performed by Maryellen Ponce MD at [address] [place] [address] History     Socioeconomic History    Marital status:      Spouse name: None    Number of children: None    Years of education: None    Highest education level: None   Occupational History    None   Social Needs    Financial resource strain: None    Food insecurity:     Worry: None     Inability: None    Transportation needs:     Medical: None     Non-medical: None   Tobacco Use    Smoking status: Former Smoker     Packs/day: 1.00     Years: 20.00     Pack years: 20.00     Types: Cigarettes     Last attempt to quit: 2007     Years since quittin.5    Smokeless tobacco: Never Used   Substance and Sexual Activity    Alcohol use: No    Drug use: No    Sexual activity: Yes     Partners:

## 2019-08-08 ENCOUNTER — OFFICE VISIT (OUTPATIENT)
Dept: FAMILY MEDICINE CLINIC | Age: 49
End: 2019-08-08
Payer: COMMERCIAL

## 2019-08-08 VITALS
BODY MASS INDEX: 38.31 KG/M2 | RESPIRATION RATE: 16 BRPM | HEIGHT: 66 IN | DIASTOLIC BLOOD PRESSURE: 78 MMHG | WEIGHT: 238.38 LBS | HEART RATE: 61 BPM | SYSTOLIC BLOOD PRESSURE: 120 MMHG

## 2019-08-08 DIAGNOSIS — F41.9 ANXIETY: ICD-10-CM

## 2019-08-08 DIAGNOSIS — R73.03 PRE-DIABETES: ICD-10-CM

## 2019-08-08 DIAGNOSIS — I10 ESSENTIAL HYPERTENSION: ICD-10-CM

## 2019-08-08 PROCEDURE — 99214 OFFICE O/P EST MOD 30 MIN: CPT | Performed by: NURSE PRACTITIONER

## 2019-08-08 ASSESSMENT — ENCOUNTER SYMPTOMS
ABDOMINAL PAIN: 0
CHEST TIGHTNESS: 0
NAUSEA: 0
VOMITING: 0
CONSTIPATION: 0
WHEEZING: 0
SHORTNESS OF BREATH: 0
DIARRHEA: 0
COUGH: 0

## 2019-08-08 NOTE — PROGRESS NOTES
Stress: None   Relationships    Social connections:     Talks on phone: None     Gets together: None     Attends [employer] service: None     Active member of club or organization: None     Attends meetings of clubs or organizations: None     Relationship status: None    Intimate partner violence:     Fear of current or ex partner: None     Emotionally abused: None     Physically abused: None     Forced sexual activity: None   Other Topics Concern    None   Social History Narrative    None         Pre-diabetes  She does not check blood sugar. Denies polyuria, polydipsia, polyphagia. Anxiety  She has only been on effexor for two weeks. She has not noticed a difference. She was dizzy for a few days when she first started it, but that resolved. She is still waking up at night 1-2x/night. Sometimes can go back to sleep in 15 minutes and other times unable to go back to sleep. She is taking melatonin at night. Essential hypertension  Stable. Patient denies any exertional chest pain, dyspnea, palpitations, syncope, orthopnea, edema or paroxysmal nocturnal dyspnea. No having symptoms any longer  Pulse staying in  The 60s        Review of Systems   Constitutional: Negative for appetite change, chills, fatigue and unexpected weight change. Respiratory: Negative for cough, chest tightness, shortness of breath and wheezing. Cardiovascular: Negative for chest pain, palpitations and leg swelling. Gastrointestinal: Negative for abdominal pain, constipation, diarrhea, nausea and vomiting. Musculoskeletal: Negative for arthralgias. Neurological: Negative for weakness, numbness and headaches. Hematological: Negative for adenopathy. OBJECTIVE:    /78 (Site: Left Upper Arm, Position: Sitting, Cuff Size: Large Adult)   Pulse 61   Resp 16   Ht 5' 5.5\" (1.664 m)   Wt 238 lb 6 oz (108.1 kg)   BMI 39.06 kg/m²     Physical Exam   Constitutional: She is oriented to person, place, and time.  She

## 2019-08-12 ENCOUNTER — OFFICE VISIT (OUTPATIENT)
Dept: FAMILY MEDICINE CLINIC | Age: 49
End: 2019-08-12
Payer: COMMERCIAL

## 2019-08-12 VITALS
WEIGHT: 239 LBS | BODY MASS INDEX: 39.17 KG/M2 | RESPIRATION RATE: 16 BRPM | SYSTOLIC BLOOD PRESSURE: 134 MMHG | DIASTOLIC BLOOD PRESSURE: 86 MMHG | HEART RATE: 62 BPM | OXYGEN SATURATION: 97 %

## 2019-08-12 DIAGNOSIS — R21 RASH AND NONSPECIFIC SKIN ERUPTION: Primary | ICD-10-CM

## 2019-08-12 PROCEDURE — 99213 OFFICE O/P EST LOW 20 MIN: CPT | Performed by: NURSE PRACTITIONER

## 2019-08-12 RX ORDER — METHYLPREDNISOLONE 4 MG/1
TABLET ORAL
Qty: 1 KIT | Refills: 0 | Status: SHIPPED | OUTPATIENT
Start: 2019-08-12 | End: 2019-08-18

## 2019-08-12 ASSESSMENT — ENCOUNTER SYMPTOMS
ABDOMINAL PAIN: 0
DIARRHEA: 0
WHEEZING: 0
SHORTNESS OF BREATH: 0
COUGH: 0
NAUSEA: 0
VOMITING: 0
CONSTIPATION: 0
CHEST TIGHTNESS: 0

## 2019-08-21 ENCOUNTER — TELEPHONE (OUTPATIENT)
Dept: FAMILY MEDICINE CLINIC | Age: 49
End: 2019-08-21

## 2019-08-21 NOTE — TELEPHONE ENCOUNTER
If she is still taking the effexor she should stop that and FU with [facility] next week for alternative. If she needs to be seen sooner to be evaluated please have her make an appointment.

## 2019-08-22 ENCOUNTER — OFFICE VISIT (OUTPATIENT)
Dept: FAMILY MEDICINE CLINIC | Age: 49
End: 2019-08-22
Payer: COMMERCIAL

## 2019-08-22 VITALS
DIASTOLIC BLOOD PRESSURE: 80 MMHG | WEIGHT: 235.4 LBS | HEART RATE: 84 BPM | BODY MASS INDEX: 38.58 KG/M2 | RESPIRATION RATE: 16 BRPM | SYSTOLIC BLOOD PRESSURE: 126 MMHG

## 2019-08-22 DIAGNOSIS — R21 RASH: ICD-10-CM

## 2019-08-22 PROBLEM — K35.80 ACUTE APPENDICITIS: Status: RESOLVED | Noted: 2019-05-30 | Resolved: 2019-08-22

## 2019-08-22 PROCEDURE — 99212 OFFICE O/P EST SF 10 MIN: CPT | Performed by: PHYSICIAN ASSISTANT

## 2019-08-22 RX ORDER — HYDROCORTISONE VALERATE 2 MG/G
OINTMENT TOPICAL
Qty: 45 G | Refills: 1 | Status: SHIPPED | OUTPATIENT
Start: 2019-08-22 | End: 2019-10-23 | Stop reason: ALTCHOICE

## 2019-08-22 ASSESSMENT — PATIENT HEALTH QUESTIONNAIRE - PHQ9
1. LITTLE INTEREST OR PLEASURE IN DOING THINGS: 1
SUM OF ALL RESPONSES TO PHQ QUESTIONS 1-9: 2
SUM OF ALL RESPONSES TO PHQ QUESTIONS 1-9: 2
SUM OF ALL RESPONSES TO PHQ9 QUESTIONS 1 & 2: 2
2. FEELING DOWN, DEPRESSED OR HOPELESS: 1

## 2019-08-22 NOTE — PROGRESS NOTES
Nubia García  1970  46 y.o.  female    SUBJECTIVE:    Chief Complaint   Patient presents with    Rash     Patient is here today for rash. States not going away. Itchy. Prednisone took the edge off but now it is back. HPI   Rash-pt states she was seen approx 10 days ago for rash. Pt had been to [facility] approx 1.5 weeks prior to onset. Was given steroids with very little improvement. Rash continues as small scattered red bumps that itch then eventually open/scab over. Scattered around neck, lul forearms and lul lower legs.      Allergies   Allergen Reactions    Pcn [Penicillins] Hives     Any derivative       Past Medical History:   Diagnosis Date    Diabetes mellitus ([facility]     Graves disease     Hypertension     Hyperthyroidism     Migraine     Type 2 diabetes mellitus without complication ([facility]        Past Surgical History:   Procedure Laterality Date     SECTION      HYSTERECTOMY  2015    bilateral ovaries not removed    HYSTERECTOMY, VAGINAL      LAMINECTOMY      LAPAROSCOPIC APPENDECTOMY N/A 2019    APPENDECTOMY LAPAROSCOPIC performed by Juan Espinoza MD at [address] History     Socioeconomic History    Marital status:      Spouse name: None    Number of children: None    Years of education: None    Highest education level: None   Occupational History    None   Social Needs    Financial resource strain: None    Food insecurity:     Worry: None     Inability: None    Transportation needs:     Medical: None     Non-medical: None   Tobacco Use    Smoking status: Former Smoker     Packs/day: 1.00     Years: 20.00     Pack years: 20.00     Types: Cigarettes     Last attempt to quit:      Years since quittin.6    Smokeless tobacco: Never Used   Substance and Sexual Activity    Alcohol use: No    Drug use: No    Sexual activity: Yes     Partners: Male   Lifestyle    Physical activity: Days per week: None     Minutes per session: None    Stress: None   Relationships    Social connections:     Talks on phone: None     Gets together: None     Attends Confucianism service: None     Active member of club or organization: None     Attends meetings of clubs or organizations: None     Relationship status: None    Intimate partner violence:     Fear of current or ex partner: None     Emotionally abused: None     Physically abused: None     Forced sexual activity: None   Other Topics Concern    None   Social History Narrative    None       Review of Systems   Constitutional: Negative for chills and fever. Skin: Positive for rash. Allergic/Immunologic: Negative for immunocompromised state. Hematological: Does not bruise/bleed easily. OBJECTIVE:    /80 (Site: Left Upper Arm, Position: Sitting, Cuff Size: Large Adult)   Pulse 84   Resp 16   Wt 235 lb 6.4 oz (106.8 kg)   BMI 38.58 kg/m²     Physical Exam   Constitutional: She is oriented to person, place, and time. She appears well-developed and well-nourished. No distress. Neurological: She is alert and oriented to person, place, and time. Skin: Skin is warm and dry. Rash noted. Rash is papular. ASSESSMENT/PLAN:    Problem List        Musculoskeletal and Integument    Rash     Appears to be more consistent with some type of insect/mite infestation. Will use topical hydrocortisone for itching and irritation, advised to apply clear nail polish to spots, f/u if not better in next week                     Return if symptoms worsen or fail to improve.

## 2019-09-25 ENCOUNTER — OFFICE VISIT (OUTPATIENT)
Dept: FAMILY MEDICINE CLINIC | Age: 49
End: 2019-09-25
Payer: COMMERCIAL

## 2019-09-25 VITALS
BODY MASS INDEX: 38.68 KG/M2 | SYSTOLIC BLOOD PRESSURE: 132 MMHG | DIASTOLIC BLOOD PRESSURE: 86 MMHG | HEART RATE: 74 BPM | RESPIRATION RATE: 16 BRPM | WEIGHT: 236 LBS

## 2019-09-25 DIAGNOSIS — F41.9 ANXIETY: ICD-10-CM

## 2019-09-25 DIAGNOSIS — I10 ESSENTIAL HYPERTENSION: ICD-10-CM

## 2019-09-25 DIAGNOSIS — R82.90 ABNORMAL URINE ODOR: Primary | ICD-10-CM

## 2019-09-25 LAB
BILIRUBIN, POC: ABNORMAL
BLOOD URINE, POC: ABNORMAL
CLARITY, POC: ABNORMAL
COLOR, POC: ABNORMAL
GLUCOSE URINE, POC: ABNORMAL
KETONES, POC: ABNORMAL
LEUKOCYTE EST, POC: ABNORMAL
NITRITE, POC: ABNORMAL
PH, POC: 5.5
PROTEIN, POC: ABNORMAL
SPECIFIC GRAVITY, POC: >=1.03
UROBILINOGEN, POC: ABNORMAL

## 2019-09-25 PROCEDURE — 81002 URINALYSIS NONAUTO W/O SCOPE: CPT | Performed by: NURSE PRACTITIONER

## 2019-09-25 PROCEDURE — 99214 OFFICE O/P EST MOD 30 MIN: CPT | Performed by: NURSE PRACTITIONER

## 2019-09-25 ASSESSMENT — ANXIETY QUESTIONNAIRES
3. WORRYING TOO MUCH ABOUT DIFFERENT THINGS: 0-NOT AT ALL SURE
5. BEING SO RESTLESS THAT IT IS HARD TO SIT STILL: 3-NEARLY EVERY DAY
GAD7 TOTAL SCORE: 8
6. BECOMING EASILY ANNOYED OR IRRITABLE: 1-SEVERAL DAYS
1. FEELING NERVOUS, ANXIOUS, OR ON EDGE: 2-OVER HALF THE DAYS
4. TROUBLE RELAXING: 2-OVER HALF THE DAYS
7. FEELING AFRAID AS IF SOMETHING AWFUL MIGHT HAPPEN: 0-NOT AT ALL SURE
2. NOT BEING ABLE TO STOP OR CONTROL WORRYING: 0-NOT AT ALL SURE

## 2019-09-25 ASSESSMENT — ENCOUNTER SYMPTOMS
NAUSEA: 0
VOMITING: 0
DIARRHEA: 0
SHORTNESS OF BREATH: 0
WHEEZING: 0
CHEST TIGHTNESS: 0
COUGH: 0
ABDOMINAL PAIN: 0
CONSTIPATION: 0

## 2019-09-25 ASSESSMENT — PATIENT HEALTH QUESTIONNAIRE - PHQ9
SUM OF ALL RESPONSES TO PHQ9 QUESTIONS 1 & 2: 1
2. FEELING DOWN, DEPRESSED OR HOPELESS: 1
SUM OF ALL RESPONSES TO PHQ QUESTIONS 1-9: 1
SUM OF ALL RESPONSES TO PHQ QUESTIONS 1-9: 1
1. LITTLE INTEREST OR PLEASURE IN DOING THINGS: 0

## 2019-09-25 NOTE — PROGRESS NOTES
N/A 2019    APPENDECTOMY LAPAROSCOPIC performed by Michela Acharya MD at [address] History     Socioeconomic History    Marital status:      Spouse name: None    Number of children: None    Years of education: None    Highest education level: None   Occupational History    None   Social Needs    Financial resource strain: None    Food insecurity:     Worry: None     Inability: None    Transportation needs:     Medical: None     Non-medical: None   Tobacco Use    Smoking status: Former Smoker     Packs/day: 1.00     Years: 20.00     Pack years: 20.00     Types: Cigarettes     Last attempt to quit:      Years since quittin.7    Smokeless tobacco: Never Used   Substance and Sexual Activity    Alcohol use: No    Drug use: No    Sexual activity: Yes     Partners: Male   Lifestyle    Physical activity:     Days per week: None     Minutes per session: None    Stress: None   Relationships    Social connections:     Talks on phone: None     Gets together: None     Attends Orthodox service: None     Active member of club or organization: None     Attends meetings of clubs or organizations: None     Relationship status: None    Intimate partner violence:     Fear of current or ex partner: None     Emotionally abused: None     Physically abused: None     Forced sexual activity: None   Other Topics Concern    None   Social History Narrative    None         Essential hypertension  Stable. Patient denies any exertional chest pain, dyspnea, palpitations, syncope, orthopnea or paroxysmal nocturnal dyspnea. Mild peripheral edema--has been on her feet all day. Anxiety  Anxiety not as intense. She feels the effexor has helped. [place] is off a lot of it\". Some days \"I'm good. \"  THERESE 7 SCORE 2019   THERESE-7 Total Score 8 11     Interpretation of THERESE-7 score: 5-9 = mild anxiety, 10-14 = moderate anxiety, 15+ = severe anxiety.  Recommend

## 2019-10-23 ENCOUNTER — OFFICE VISIT (OUTPATIENT)
Dept: FAMILY MEDICINE CLINIC | Age: 49
End: 2019-10-23
Payer: COMMERCIAL

## 2019-10-23 VITALS
SYSTOLIC BLOOD PRESSURE: 110 MMHG | RESPIRATION RATE: 16 BRPM | BODY MASS INDEX: 38.68 KG/M2 | HEART RATE: 66 BPM | DIASTOLIC BLOOD PRESSURE: 80 MMHG | WEIGHT: 236 LBS

## 2019-10-23 DIAGNOSIS — F41.9 ANXIETY: Primary | ICD-10-CM

## 2019-10-23 DIAGNOSIS — I10 ESSENTIAL HYPERTENSION: ICD-10-CM

## 2019-10-23 DIAGNOSIS — M70.71 BURSITIS OF RIGHT HIP, UNSPECIFIED BURSA: ICD-10-CM

## 2019-10-23 PROCEDURE — 99213 OFFICE O/P EST LOW 20 MIN: CPT | Performed by: NURSE PRACTITIONER

## 2019-10-23 RX ORDER — VENLAFAXINE HYDROCHLORIDE 37.5 MG/1
37.5 CAPSULE, EXTENDED RELEASE ORAL DAILY
Qty: 30 CAPSULE | Refills: 5 | Status: SHIPPED | OUTPATIENT
Start: 2019-10-23 | End: 2020-04-01 | Stop reason: SDUPTHER

## 2019-10-23 ASSESSMENT — PATIENT HEALTH QUESTIONNAIRE - PHQ9
1. LITTLE INTEREST OR PLEASURE IN DOING THINGS: 0
SUM OF ALL RESPONSES TO PHQ QUESTIONS 1-9: 0
SUM OF ALL RESPONSES TO PHQ QUESTIONS 1-9: 0
2. FEELING DOWN, DEPRESSED OR HOPELESS: 0
SUM OF ALL RESPONSES TO PHQ9 QUESTIONS 1 & 2: 0

## 2019-10-23 ASSESSMENT — ENCOUNTER SYMPTOMS
CONSTIPATION: 0
SHORTNESS OF BREATH: 0
DIARRHEA: 0
COUGH: 0
ABDOMINAL PAIN: 0
WHEEZING: 0
NAUSEA: 0
CHEST TIGHTNESS: 0
VOMITING: 0

## 2019-12-12 ENCOUNTER — OFFICE VISIT (OUTPATIENT)
Dept: FAMILY MEDICINE CLINIC | Age: 49
End: 2019-12-12
Payer: COMMERCIAL

## 2019-12-12 VITALS
SYSTOLIC BLOOD PRESSURE: 128 MMHG | WEIGHT: 233.4 LBS | OXYGEN SATURATION: 98 % | TEMPERATURE: 98.3 F | DIASTOLIC BLOOD PRESSURE: 70 MMHG | BODY MASS INDEX: 38.25 KG/M2 | RESPIRATION RATE: 16 BRPM | HEART RATE: 78 BPM

## 2019-12-12 DIAGNOSIS — J02.9 SORE THROAT: Primary | ICD-10-CM

## 2019-12-12 DIAGNOSIS — J02.0 STREP PHARYNGITIS: ICD-10-CM

## 2019-12-12 LAB — STREPTOCOCCUS A RNA: POSITIVE

## 2019-12-12 PROCEDURE — 87651 STREP A DNA AMP PROBE: CPT | Performed by: NURSE PRACTITIONER

## 2019-12-12 PROCEDURE — 99213 OFFICE O/P EST LOW 20 MIN: CPT | Performed by: NURSE PRACTITIONER

## 2019-12-12 RX ORDER — AZITHROMYCIN 250 MG/1
250 TABLET, FILM COATED ORAL SEE ADMIN INSTRUCTIONS
Qty: 6 TABLET | Refills: 0 | Status: SHIPPED | OUTPATIENT
Start: 2019-12-12 | End: 2019-12-17

## 2019-12-12 ASSESSMENT — ENCOUNTER SYMPTOMS
EYE PAIN: 0
SINUS PRESSURE: 1
COUGH: 1
RHINORRHEA: 0
VOMITING: 0
WHEEZING: 0
SINUS PAIN: 0
VOICE CHANGE: 0
NAUSEA: 0
CHEST TIGHTNESS: 0
BACK PAIN: 0
SORE THROAT: 1
EYE REDNESS: 0
PHOTOPHOBIA: 0
SHORTNESS OF BREATH: 0
DIARRHEA: 0
TROUBLE SWALLOWING: 1
ABDOMINAL DISTENTION: 0
ABDOMINAL PAIN: 0
EYE DISCHARGE: 0

## 2019-12-12 ASSESSMENT — PATIENT HEALTH QUESTIONNAIRE - PHQ9
SUM OF ALL RESPONSES TO PHQ QUESTIONS 1-9: 0
SUM OF ALL RESPONSES TO PHQ QUESTIONS 1-9: 0
SUM OF ALL RESPONSES TO PHQ9 QUESTIONS 1 & 2: 0
1. LITTLE INTEREST OR PLEASURE IN DOING THINGS: 0
2. FEELING DOWN, DEPRESSED OR HOPELESS: 0

## 2019-12-23 ENCOUNTER — TELEPHONE (OUTPATIENT)
Dept: FAMILY MEDICINE CLINIC | Age: 49
End: 2019-12-23

## 2019-12-26 ENCOUNTER — OFFICE VISIT (OUTPATIENT)
Dept: FAMILY MEDICINE CLINIC | Age: 49
End: 2019-12-26
Payer: COMMERCIAL

## 2019-12-26 VITALS
WEIGHT: 237.2 LBS | OXYGEN SATURATION: 98 % | RESPIRATION RATE: 16 BRPM | SYSTOLIC BLOOD PRESSURE: 128 MMHG | BODY MASS INDEX: 38.87 KG/M2 | DIASTOLIC BLOOD PRESSURE: 80 MMHG | TEMPERATURE: 98.4 F | HEART RATE: 64 BPM

## 2019-12-26 DIAGNOSIS — J01.90 ACUTE BACTERIAL SINUSITIS: Primary | ICD-10-CM

## 2019-12-26 DIAGNOSIS — B96.89 ACUTE BACTERIAL SINUSITIS: Primary | ICD-10-CM

## 2019-12-26 PROCEDURE — 99213 OFFICE O/P EST LOW 20 MIN: CPT | Performed by: NURSE PRACTITIONER

## 2019-12-26 RX ORDER — FLUTICASONE PROPIONATE 50 MCG
1 SPRAY, SUSPENSION (ML) NASAL DAILY
Qty: 1 BOTTLE | Refills: 0 | Status: SHIPPED
Start: 2019-12-26 | End: 2020-04-01 | Stop reason: CLARIF

## 2019-12-26 RX ORDER — DOXYCYCLINE HYCLATE 100 MG
100 TABLET ORAL 2 TIMES DAILY
Qty: 20 TABLET | Refills: 0 | Status: SHIPPED | OUTPATIENT
Start: 2019-12-26 | End: 2020-01-05

## 2019-12-26 ASSESSMENT — ENCOUNTER SYMPTOMS
RHINORRHEA: 1
CHEST TIGHTNESS: 0
SORE THROAT: 1
VOMITING: 0
PHOTOPHOBIA: 0
SINUS PRESSURE: 1
CONSTIPATION: 0
WHEEZING: 0
BACK PAIN: 0
BLOOD IN STOOL: 0
DIARRHEA: 0
TROUBLE SWALLOWING: 0
SHORTNESS OF BREATH: 0
EYE PAIN: 0
SINUS PAIN: 1
COUGH: 0
NAUSEA: 0
ABDOMINAL PAIN: 0

## 2020-01-24 ENCOUNTER — HOSPITAL ENCOUNTER (OUTPATIENT)
Dept: ULTRASOUND IMAGING | Age: 50
Discharge: HOME | End: 2020-01-24
Payer: COMMERCIAL

## 2020-01-24 ENCOUNTER — OFFICE VISIT (OUTPATIENT)
Dept: FAMILY MEDICINE CLINIC | Age: 50
End: 2020-01-24
Payer: COMMERCIAL

## 2020-01-24 VITALS
OXYGEN SATURATION: 97 % | BODY MASS INDEX: 40.05 KG/M2 | DIASTOLIC BLOOD PRESSURE: 72 MMHG | WEIGHT: 244.4 LBS | TEMPERATURE: 98.3 F | RESPIRATION RATE: 16 BRPM | HEART RATE: 76 BPM | SYSTOLIC BLOOD PRESSURE: 128 MMHG

## 2020-01-24 PROCEDURE — 99213 OFFICE O/P EST LOW 20 MIN: CPT | Performed by: NURSE PRACTITIONER

## 2020-01-24 PROCEDURE — 93971 EXTREMITY STUDY: CPT

## 2020-01-24 ASSESSMENT — PATIENT HEALTH QUESTIONNAIRE - PHQ9
1. LITTLE INTEREST OR PLEASURE IN DOING THINGS: 0
SUM OF ALL RESPONSES TO PHQ9 QUESTIONS 1 & 2: 0
2. FEELING DOWN, DEPRESSED OR HOPELESS: 0
SUM OF ALL RESPONSES TO PHQ QUESTIONS 1-9: 0
SUM OF ALL RESPONSES TO PHQ QUESTIONS 1-9: 0

## 2020-01-24 NOTE — PROGRESS NOTES
Negative for environmental allergies, food allergies and immunocompromised state. Neurological: Negative for dizziness, syncope, weakness, light-headedness, numbness and headaches. Hematological: Negative for adenopathy. Does not bruise/bleed easily. Psychiatric/Behavioral: Negative for confusion, decreased concentration, self-injury, sleep disturbance and suicidal ideas. The patient is not nervous/anxious. Prior to Visit Medications    Medication Sig Taking?  Authorizing Provider   fluticasone (FLONASE) 50 MCG/ACT nasal spray 1 spray by Each Nostril route daily  Kadeem Mccartney, APRN - CNP   venlafaxine (EFFEXOR XR) 37.5 MG extended release capsule Take 1 capsule by mouth daily With 75 mg capsule  Hemalatha Coil, SCOOTER - CNP   venlafaxine (EFFEXOR XR) 75 MG extended release capsule TAKE 1 CAPSULE BY MOUTH ONCE DAILY  Hemalatha Coil, SCOOTER Holley CNP   hydrochlorothiazide (HYDRODIURIL) 50 MG tablet Take 1 tablet by mouth daily  Hemalatha Coil, SCOOTER - CNP        Social History     Tobacco Use    Smoking status: Former Smoker     Packs/day: 1.00     Years: 20.00     Pack years: 20.00     Types: Cigarettes     Last attempt to quit:      Years since quittin.0    Smokeless tobacco: Never Used   Substance Use Topics    Alcohol use: No    Drug use: No        Lab Results   Component Value Date    WBC 13.6 (H) 2019    HGB 14.9 2019    HCT 45.5 2019    MCV 89.9 2019     2019     Lab Results   Component Value Date    LABA1C 5.7 2019     No results found for: TSHFT4, TSH  Lab Results   Component Value Date    CHOL 207 (H) 2019    TRIG 269 (H) 2019    HDL 47 2019    LDLCALC 106 (H) 2019    LABVLDL 54 2019       Wt Readings from Last 3 Encounters:   20 244 lb 6.4 oz (110.9 kg)   19 237 lb 3.2 oz (107.6 kg)   19 233 lb 6.4 oz (105.9 kg)       Wt Readings from Last 3 Encounters:   20 244 lb 6.4 oz (110.9 kg)   19 237 lb 3.2 oz (107.6 kg)   12/12/19 233 lb 6.4 oz (105.9 kg)     Temp Readings from Last 3 Encounters:   01/24/20 98.3 °F (36.8 °C) (Oral)   12/26/19 98.4 °F (36.9 °C) (Oral)   12/12/19 98.3 °F (36.8 °C) (Oral)     BP Readings from Last 3 Encounters:   01/24/20 128/72   12/26/19 128/80   12/12/19 128/70     Pulse Readings from Last 3 Encounters:   01/24/20 76   12/26/19 64   12/12/19 78        No results found for this visit on 01/24/20. Physical Exam  Constitutional:       General: She is not in acute distress. Appearance: Normal appearance. She is well-developed. HENT:      Head: Normocephalic and atraumatic. Right Ear: Tympanic membrane, ear canal and external ear normal.      Left Ear: Tympanic membrane, ear canal and external ear normal.   Eyes:      Conjunctiva/sclera: Conjunctivae normal.      Pupils: Pupils are equal, round, and reactive to light. Neck:      Musculoskeletal: Full passive range of motion without pain, normal range of motion and neck supple. Thyroid: No thyroid mass or thyromegaly. Trachea: Trachea normal.   Cardiovascular:      Rate and Rhythm: Normal rate and regular rhythm. Pulses: Normal pulses. Radial pulses are 2+ on the right side and 2+ on the left side. Popliteal pulses are 2+ on the right side and 2+ on the left side. Dorsalis pedis pulses are 2+ on the right side and 2+ on the left side. Posterior tibial pulses are 2+ on the right side and 2+ on the left side. Heart sounds: S1 normal and S2 normal.   Pulmonary:      Effort: Pulmonary effort is normal. No accessory muscle usage or respiratory distress. Breath sounds: Normal breath sounds. No stridor. No wheezing, rhonchi or rales. Abdominal:      General: Bowel sounds are normal. There is no distension. Palpations: Abdomen is soft. Tenderness: There is no abdominal tenderness.    Musculoskeletal:      Right shoulder: She exhibits normal range of motion, no

## 2020-01-26 ASSESSMENT — ENCOUNTER SYMPTOMS
PHOTOPHOBIA: 0
VOMITING: 0
CHEST TIGHTNESS: 0
COUGH: 0
ABDOMINAL PAIN: 0
BACK PAIN: 0
BLOOD IN STOOL: 0
SORE THROAT: 0
RHINORRHEA: 0
WHEEZING: 0
SHORTNESS OF BREATH: 0
DIARRHEA: 0
NAUSEA: 0
EYE PAIN: 0
CONSTIPATION: 0
SINUS PAIN: 0
TROUBLE SWALLOWING: 0
SINUS PRESSURE: 0

## 2020-02-26 ENCOUNTER — OFFICE VISIT (OUTPATIENT)
Dept: FAMILY MEDICINE CLINIC | Age: 50
End: 2020-02-26
Payer: COMMERCIAL

## 2020-02-26 VITALS
BODY MASS INDEX: 40.67 KG/M2 | RESPIRATION RATE: 16 BRPM | DIASTOLIC BLOOD PRESSURE: 74 MMHG | OXYGEN SATURATION: 97 % | WEIGHT: 248.2 LBS | SYSTOLIC BLOOD PRESSURE: 136 MMHG | HEART RATE: 79 BPM

## 2020-02-26 LAB
A/G RATIO: 1.4 (ref 1.1–2.2)
ALBUMIN SERPL-MCNC: 4.1 G/DL (ref 3.4–5)
ALP BLD-CCNC: 131 U/L (ref 40–129)
ALT SERPL-CCNC: 17 U/L (ref 10–40)
ANION GAP SERPL CALCULATED.3IONS-SCNC: 14 MMOL/L (ref 3–16)
AST SERPL-CCNC: 21 U/L (ref 15–37)
BASOPHILS ABSOLUTE: 0.1 K/UL (ref 0–0.2)
BASOPHILS RELATIVE PERCENT: 0.8 %
BILIRUB SERPL-MCNC: <0.2 MG/DL (ref 0–1)
BUN BLDV-MCNC: 28 MG/DL (ref 7–20)
CALCIUM SERPL-MCNC: 9.2 MG/DL (ref 8.3–10.6)
CHLORIDE BLD-SCNC: 98 MMOL/L (ref 99–110)
CHOLESTEROL, TOTAL: 217 MG/DL (ref 0–199)
CO2: 27 MMOL/L (ref 21–32)
CREAT SERPL-MCNC: 0.6 MG/DL (ref 0.6–1.1)
EOSINOPHILS ABSOLUTE: 0.5 K/UL (ref 0–0.6)
EOSINOPHILS RELATIVE PERCENT: 4.8 %
GFR AFRICAN AMERICAN: >60
GFR NON-AFRICAN AMERICAN: >60
GLOBULIN: 2.9 G/DL
GLUCOSE BLD-MCNC: 118 MG/DL (ref 70–99)
HCT VFR BLD CALC: 42.6 % (ref 36–48)
HDLC SERPL-MCNC: 53 MG/DL (ref 40–60)
HEMOGLOBIN: 14.1 G/DL (ref 12–16)
LDL CHOLESTEROL CALCULATED: 130 MG/DL
LYMPHOCYTES ABSOLUTE: 2.9 K/UL (ref 1–5.1)
LYMPHOCYTES RELATIVE PERCENT: 25.7 %
MCH RBC QN AUTO: 28.9 PG (ref 26–34)
MCHC RBC AUTO-ENTMCNC: 33.1 G/DL (ref 31–36)
MCV RBC AUTO: 87.4 FL (ref 80–100)
MONOCYTES ABSOLUTE: 0.8 K/UL (ref 0–1.3)
MONOCYTES RELATIVE PERCENT: 6.8 %
NEUTROPHILS ABSOLUTE: 7 K/UL (ref 1.7–7.7)
NEUTROPHILS RELATIVE PERCENT: 61.9 %
PDW BLD-RTO: 13.7 % (ref 12.4–15.4)
PLATELET # BLD: 297 K/UL (ref 135–450)
PMV BLD AUTO: 8.4 FL (ref 5–10.5)
POTASSIUM SERPL-SCNC: 3.4 MMOL/L (ref 3.5–5.1)
RBC # BLD: 4.87 M/UL (ref 4–5.2)
SODIUM BLD-SCNC: 139 MMOL/L (ref 136–145)
TOTAL PROTEIN: 7 G/DL (ref 6.4–8.2)
TRIGL SERPL-MCNC: 171 MG/DL (ref 0–150)
TSH REFLEX: 1.07 UIU/ML (ref 0.27–4.2)
VLDLC SERPL CALC-MCNC: 34 MG/DL
WBC # BLD: 11.3 K/UL (ref 4–11)

## 2020-02-26 PROCEDURE — 99214 OFFICE O/P EST MOD 30 MIN: CPT | Performed by: NURSE PRACTITIONER

## 2020-02-26 PROCEDURE — 36415 COLL VENOUS BLD VENIPUNCTURE: CPT | Performed by: NURSE PRACTITIONER

## 2020-02-26 RX ORDER — VENLAFAXINE HYDROCHLORIDE 75 MG/1
CAPSULE, EXTENDED RELEASE ORAL
Qty: 30 CAPSULE | Refills: 5 | Status: SHIPPED | OUTPATIENT
Start: 2020-02-26 | End: 2020-03-19 | Stop reason: SDUPTHER

## 2020-02-26 ASSESSMENT — ENCOUNTER SYMPTOMS
COUGH: 0
CONSTIPATION: 0
CHEST TIGHTNESS: 0
SHORTNESS OF BREATH: 0
DIARRHEA: 0
VOMITING: 0
WHEEZING: 0
ABDOMINAL PAIN: 0
NAUSEA: 0

## 2020-02-26 NOTE — PATIENT INSTRUCTIONS
Patient Education        Patellofemoral Pain Syndrome (Runner's Knee): Exercises  Introduction  Here are some examples of exercises for you to try. The exercises may be suggested for a condition or for rehabilitation. Start each exercise slowly. Ease off the exercises if you start to have pain. You will be told when to start these exercises and which ones will work best for you. How to do the exercises  Calf wall stretch   1. Stand facing a wall with your hands on the wall at about eye level. Put your affected leg about a step behind your other leg. 2. Keeping your back leg straight and your back heel on the floor, bend your front knee and gently bring your hip and chest toward the wall until you feel a stretch in the calf of your back leg. 3. Hold the stretch for at least 15 to 30 seconds. 4. Repeat 2 to 4 times. 5. Repeat steps 1 through 4, but this time keep your back knee bent. Quadriceps stretch   1. If you are not steady on your feet, hold on to a chair, counter, or wall. 2. Bend your affected leg, and reach behind you to grab the front of your foot or ankle with the hand on the same side. For example, if you are stretching your right leg, use your right hand. 3. Keeping your knees next to each other, pull your foot toward your buttock until you feel a gentle stretch across the front of your hip and down the front of your thigh. Your knee should be pointed directly to the ground, and not out to the side. 4. Hold the stretch for at least 15 to 30 seconds. 5. Repeat 2 to 4 times. Hamstring wall stretch   1. Lie on your back in a doorway, with your good leg through the open door. 2. Slide your affected leg up the wall to straighten your knee. You should feel a gentle stretch down the back of your leg. 1. Do not arch your back. 2. Do not bend either knee. 3. Keep one heel touching the floor and the other heel touching the wall. Do not point your toes.   3. Hold the stretch for at least 1

## 2020-02-26 NOTE — PROGRESS NOTES
SUBJECTIVE:    Yoandy Ortizacher  1970  44 y.o.  female      Chief Complaint   Patient presents with    Follow-up     follow up on anxiety. reports doing okay just stressed out.  Leg Pain     reports left leg pain. not any better. HPI     Symptom onset approximately one month ago. States she had a cramp in the back of her left leg. Tried to stretch it and felt it pop. She had swelling from knee to foot. She had a blood clot ruled out. She still has some pain in her left knee. She still has some swelling. She has been wearing compression stockings with relief of swelling. Hurts in anterior knee. She has tried ice, advil with mild relief. She has been working and has not been able to rest her knee. On her feet for work. Pain comes and goes. Certain movements cause pain. Pain worse with stairs, flexion. Allergies   Allergen Reactions    Pcn [Penicillins] Hives     Any derivative       Current Outpatient Medications   Medication Sig Dispense Refill    venlafaxine (EFFEXOR XR) 75 MG extended release capsule TAKE 1 CAPSULE BY MOUTH ONCE DAILY 30 capsule 5    venlafaxine (EFFEXOR XR) 37.5 MG extended release capsule Take 1 capsule by mouth daily With 75 mg capsule 30 capsule 5    hydrochlorothiazide (HYDRODIURIL) 50 MG tablet Take 1 tablet by mouth daily 90 tablet 3    fluticasone (FLONASE) 50 MCG/ACT nasal spray 1 spray by Each Nostril route daily (Patient not taking: Reported on 2020) 1 Bottle 0     No current facility-administered medications for this visit.            Past Medical History:   Diagnosis Date    Diabetes mellitus ([facility]     Graves disease     Hypertension     Hyperthyroidism     Migraine     Type 2 diabetes mellitus without complication ([facility]      Past Surgical History:   Procedure Laterality Date     SECTION  2003    HYSTERECTOMY  2015    bilateral ovaries not removed    HYSTERECTOMY, VAGINAL      LAMINECTOMY      LAPAROSCOPIC APPENDECTOMY N/A 2019 APPENDECTOMY LAPAROSCOPIC performed by Summer Castillo MD at [address] [place] [address] History     Socioeconomic History    Marital status:      Spouse name: None    Number of children: None    Years of education: None    Highest education level: None   Occupational History    None   Social Needs    Financial resource strain: None    Food insecurity:     Worry: None     Inability: None    Transportation needs:     Medical: None     Non-medical: None   Tobacco Use    Smoking status: Former Smoker     Packs/day: 1.00     Years: 20.00     Pack years: 20.00     Types: Cigarettes     Last attempt to quit:      Years since quittin.1    Smokeless tobacco: Never Used   Substance and Sexual Activity    Alcohol use: No    Drug use: No    Sexual activity: Yes     Partners: Male   Lifestyle    Physical activity:     Days per week: None     Minutes per session: None    Stress: None   Relationships    Social connections:     Talks on phone: None     Gets together: None     Attends Zoroastrianism service: None     Active member of club or organization: None     Attends meetings of clubs or organizations: None     Relationship status: None    Intimate partner violence:     Fear of current or ex partner: None     Emotionally abused: None     Physically abused: None     Forced sexual activity: None   Other Topics Concern    None   Social History Narrative    None         Pre-diabetes  She does not check blood sugar. She has gained weight. Craving \"sweet stuff\". Essential hypertension  Patient denies any exertional chest pain, dyspnea, palpitations, syncope, orthopnea, edema or paroxysmal nocturnal dyspnea. Anxiety  Very stressed.  accepted a job in [place]--will be gone during the week and home on the weekends. Have to find an apartment. Added stress. Daughter is a maxim in high school so they are not moving with  at this time.  Feels medication is okay at this dose. Stress eating. Review of Systems   Constitutional: Negative for appetite change, chills, fatigue and unexpected weight change. Respiratory: Negative for cough, chest tightness, shortness of breath and wheezing. Cardiovascular: Negative for chest pain, palpitations and leg swelling. Gastrointestinal: Negative for abdominal pain, constipation, diarrhea, nausea and vomiting. Musculoskeletal: Positive for arthralgias and joint swelling. Neurological: Negative for weakness, numbness and headaches. Hematological: Negative for adenopathy. Psychiatric/Behavioral: Negative for suicidal ideas. The patient is nervous/anxious. OBJECTIVE:    /74   Pulse 79   Resp 16   Wt 248 lb 3.2 oz (112.6 kg)   SpO2 97%   BMI 40.67 kg/m²     Physical Exam  Constitutional:       Appearance: Normal appearance. She is well-developed. HENT:      Head: Normocephalic and atraumatic. Right Ear: Hearing normal.      Left Ear: Hearing normal.      Nose: Nose normal.   Neck:      Musculoskeletal: Normal range of motion and neck supple. Vascular: No carotid bruit or JVD. Cardiovascular:      Rate and Rhythm: Normal rate and regular rhythm. Heart sounds: Normal heart sounds. No murmur. No friction rub. No gallop. Pulmonary:      Effort: Pulmonary effort is normal. No respiratory distress. Breath sounds: Normal breath sounds. No wheezing, rhonchi or rales. Abdominal:      Palpations: Abdomen is soft. Musculoskeletal: Normal range of motion. Left knee: She exhibits normal range of motion, no LCL laxity, normal patellar mobility, normal meniscus and no MCL laxity. Tenderness found. Skin:     General: Skin is warm and dry. Neurological:      Mental Status: She is alert and oriented to person, place, and time. Psychiatric:         Mood and Affect: Mood is anxious. Behavior: Behavior normal.         Thought Content:  Thought content normal.

## 2020-02-27 DIAGNOSIS — R74.8 ELEVATED ALKALINE PHOSPHATASE LEVEL: ICD-10-CM

## 2020-02-27 LAB
ESTIMATED AVERAGE GLUCOSE: 111.2 MG/DL
HBA1C MFR BLD: 5.5 %

## 2020-03-02 LAB
ALK PHOS OTHER CALC: 0 U/L
ALK PHOSPHATASE: 136 U/L (ref 40–120)
ALKALINE PHOSPHATASE BONE FRACTION: 53 U/L (ref 0–55)
ALKALINE PHOSPHATASE LIVER FRACTION: 83 U/L (ref 0–94)

## 2020-03-19 RX ORDER — VENLAFAXINE HYDROCHLORIDE 75 MG/1
CAPSULE, EXTENDED RELEASE ORAL
Qty: 30 CAPSULE | Refills: 5 | Status: SHIPPED | OUTPATIENT
Start: 2020-03-19 | End: 2020-04-01 | Stop reason: SDUPTHER

## 2020-03-31 ENCOUNTER — TELEPHONE (OUTPATIENT)
Dept: FAMILY MEDICINE CLINIC | Age: 50
End: 2020-03-31

## 2020-03-31 NOTE — TELEPHONE ENCOUNTER
Okay. We definitely do not want her coming to the office either. We can still do a virtual visit if she prefers. Thank you.

## 2020-03-31 NOTE — TELEPHONE ENCOUNTER
Pt called back regarding her appt. States she is under quarantine for possible Covid-19 Exposure and cannot get her blood drawn at work at the moment.

## 2020-04-01 ENCOUNTER — TELEMEDICINE (OUTPATIENT)
Dept: FAMILY MEDICINE CLINIC | Age: 50
End: 2020-04-01
Payer: COMMERCIAL

## 2020-04-01 PROCEDURE — 99213 OFFICE O/P EST LOW 20 MIN: CPT | Performed by: NURSE PRACTITIONER

## 2020-04-01 RX ORDER — HYDROCHLOROTHIAZIDE 50 MG/1
50 TABLET ORAL DAILY
Qty: 90 TABLET | Refills: 3 | Status: SHIPPED | OUTPATIENT
Start: 2020-04-01 | End: 2020-07-29 | Stop reason: SDUPTHER

## 2020-04-01 RX ORDER — VENLAFAXINE HYDROCHLORIDE 75 MG/1
CAPSULE, EXTENDED RELEASE ORAL
Qty: 90 CAPSULE | Refills: 1 | Status: SHIPPED | OUTPATIENT
Start: 2020-04-01 | End: 2020-07-29 | Stop reason: SDUPTHER

## 2020-04-01 RX ORDER — VENLAFAXINE HYDROCHLORIDE 37.5 MG/1
37.5 CAPSULE, EXTENDED RELEASE ORAL DAILY
Qty: 90 CAPSULE | Refills: 1 | Status: SHIPPED | OUTPATIENT
Start: 2020-04-01 | End: 2020-07-29 | Stop reason: SDUPTHER

## 2020-04-01 ASSESSMENT — ENCOUNTER SYMPTOMS
CHEST TIGHTNESS: 0
COUGH: 0
NAUSEA: 0
CONSTIPATION: 0
DIARRHEA: 0
ABDOMINAL PAIN: 0
SHORTNESS OF BREATH: 0
VOMITING: 0
WHEEZING: 0

## 2020-04-01 ASSESSMENT — ANXIETY QUESTIONNAIRES
GAD7 TOTAL SCORE: 11
2. NOT BEING ABLE TO STOP OR CONTROL WORRYING: 0-NOT AT ALL
3. WORRYING TOO MUCH ABOUT DIFFERENT THINGS: 3-NEARLY EVERY DAY
6. BECOMING EASILY ANNOYED OR IRRITABLE: 1-SEVERAL DAYS
1. FEELING NERVOUS, ANXIOUS, OR ON EDGE: 3-NEARLY EVERY DAY
5. BEING SO RESTLESS THAT IT IS HARD TO SIT STILL: 1-SEVERAL DAYS
4. TROUBLE RELAXING: 3-NEARLY EVERY DAY
7. FEELING AFRAID AS IF SOMETHING AWFUL MIGHT HAPPEN: 0-NOT AT ALL

## 2020-04-01 NOTE — ASSESSMENT & PLAN NOTE
Not checking BP at home. Patient denies any exertional chest pain, dyspnea, palpitations, syncope, orthopnea, edema or paroxysmal nocturnal dyspnea.

## 2020-04-01 NOTE — PROGRESS NOTES
2020    TELEHEALTH EVALUATION -- Audio/Visual (During PKXUU-06 public health emergency)    Due to COVID-19 related state of emergency restrictions , as an alternative to an in-person session, the clinical decision was made to utilize a virtual visit to provide services for this patient's visit. These services were provided via [employer]. me with the patient in their home while I was located at [place] [address] and [facility]. Identity was confirmed via patient name and . Verbal consent for use of telehealth was provided to and completed by the patient. HPI:    Reny Lester (:  1970) has requested an audio/video evaluation for the following concern(s):    Chief Complaint   Patient presents with    Anxiety    Hypertension     Achy legs and knee much better since not working      Essential hypertension  Not checking BP at home. Patient denies any exertional chest pain, dyspnea, palpitations, syncope, orthopnea, edema or paroxysmal nocturnal dyspnea. Pre-diabetes  Not checking blood sugar. No polyuria, polydipsia, polyphagia. Numbness and tingling in arms if she holds her phone a certain way. Occasionally tips of toes will feel numb--had for awhile. Anxiety  States she is doing okay mentally---\"taking it one day at a time\". \"think I am working harder than if I was at [employer]". Daughter with possible COVID-19. She has pneumonia and is on antibiotics. Patient and her daughter are currently quarantined. She feels her anxiety is situational. Patient has been asymptomatic besides headaches, although they are not uncommon for her. Review of Systems   Constitutional: Negative for appetite change, chills, fatigue, fever and unexpected weight change. Respiratory: Negative for cough, chest tightness, shortness of breath and wheezing. Cardiovascular: Negative for chest pain, palpitations and leg swelling.    Gastrointestinal: Negative for abdominal pain, constipation, diarrhea, nausea and

## 2020-04-01 NOTE — ASSESSMENT & PLAN NOTE
States she is doing okay mentally---\"taking it one day at a time\". \"think I am working harder than if I was at [employer]". Daughter with possible COVID-19. She has pneumonia and is on antibiotics. Patient and her daughter are currently quarantined. She feels her anxiety is situational. Patient has been asymptomatic besides headaches, although they are not uncommon for her.

## 2020-07-29 ENCOUNTER — OFFICE VISIT (OUTPATIENT)
Dept: FAMILY MEDICINE CLINIC | Age: 50
End: 2020-07-29
Payer: COMMERCIAL

## 2020-07-29 VITALS
BODY MASS INDEX: 42.64 KG/M2 | RESPIRATION RATE: 16 BRPM | DIASTOLIC BLOOD PRESSURE: 70 MMHG | WEIGHT: 260.2 LBS | OXYGEN SATURATION: 97 % | TEMPERATURE: 97.3 F | SYSTOLIC BLOOD PRESSURE: 136 MMHG | HEART RATE: 73 BPM

## 2020-07-29 DIAGNOSIS — D72.829 LEUKOCYTOSIS, UNSPECIFIED TYPE: ICD-10-CM

## 2020-07-29 DIAGNOSIS — Z86.39 H/O HYPERTHYROIDISM: ICD-10-CM

## 2020-07-29 DIAGNOSIS — R73.03 PRE-DIABETES: ICD-10-CM

## 2020-07-29 DIAGNOSIS — K76.9 LIVER CELL DAMAGE: Primary | ICD-10-CM

## 2020-07-29 LAB
A/G RATIO: 1.5 (ref 1.1–2.2)
ALBUMIN SERPL-MCNC: 4.3 G/DL (ref 3.4–5)
ALP BLD-CCNC: 130 U/L (ref 40–129)
ALT SERPL-CCNC: 20 U/L (ref 10–40)
ANION GAP SERPL CALCULATED.3IONS-SCNC: 14 MMOL/L (ref 3–16)
AST SERPL-CCNC: 23 U/L (ref 15–37)
BASOPHILS ABSOLUTE: 0.1 K/UL (ref 0–0.2)
BASOPHILS RELATIVE PERCENT: 0.6 %
BILIRUB SERPL-MCNC: <0.2 MG/DL (ref 0–1)
BUN BLDV-MCNC: 26 MG/DL (ref 7–20)
CALCIUM SERPL-MCNC: 9.6 MG/DL (ref 8.3–10.6)
CHLORIDE BLD-SCNC: 97 MMOL/L (ref 99–110)
CO2: 28 MMOL/L (ref 21–32)
CREAT SERPL-MCNC: 0.7 MG/DL (ref 0.6–1.1)
EOSINOPHILS ABSOLUTE: 0.6 K/UL (ref 0–0.6)
EOSINOPHILS RELATIVE PERCENT: 4.9 %
GFR AFRICAN AMERICAN: >60
GFR NON-AFRICAN AMERICAN: >60
GLOBULIN: 2.8 G/DL
GLUCOSE BLD-MCNC: 101 MG/DL (ref 70–99)
HCT VFR BLD CALC: 41.5 % (ref 36–48)
HEMOGLOBIN: 14 G/DL (ref 12–16)
LYMPHOCYTES ABSOLUTE: 3.3 K/UL (ref 1–5.1)
LYMPHOCYTES RELATIVE PERCENT: 26.1 %
MCH RBC QN AUTO: 29.2 PG (ref 26–34)
MCHC RBC AUTO-ENTMCNC: 33.8 G/DL (ref 31–36)
MCV RBC AUTO: 86.4 FL (ref 80–100)
MONOCYTES ABSOLUTE: 0.9 K/UL (ref 0–1.3)
MONOCYTES RELATIVE PERCENT: 7.5 %
NEUTROPHILS ABSOLUTE: 7.7 K/UL (ref 1.7–7.7)
NEUTROPHILS RELATIVE PERCENT: 60.9 %
PDW BLD-RTO: 13.2 % (ref 12.4–15.4)
PLATELET # BLD: 286 K/UL (ref 135–450)
PMV BLD AUTO: 8.3 FL (ref 5–10.5)
POTASSIUM SERPL-SCNC: 3.5 MMOL/L (ref 3.5–5.1)
RBC # BLD: 4.8 M/UL (ref 4–5.2)
SODIUM BLD-SCNC: 139 MMOL/L (ref 136–145)
TOTAL PROTEIN: 7.1 G/DL (ref 6.4–8.2)
TSH REFLEX: 1.07 UIU/ML (ref 0.27–4.2)
WBC # BLD: 12.7 K/UL (ref 4–11)

## 2020-07-29 PROCEDURE — 99214 OFFICE O/P EST MOD 30 MIN: CPT | Performed by: NURSE PRACTITIONER

## 2020-07-29 RX ORDER — HYDROCHLOROTHIAZIDE 50 MG/1
50 TABLET ORAL DAILY
Qty: 90 TABLET | Refills: 3 | Status: SHIPPED | OUTPATIENT
Start: 2020-07-29 | End: 2021-04-19 | Stop reason: SDUPTHER

## 2020-07-29 RX ORDER — VENLAFAXINE HYDROCHLORIDE 75 MG/1
CAPSULE, EXTENDED RELEASE ORAL
Qty: 90 CAPSULE | Refills: 1 | Status: SHIPPED
Start: 2020-07-29 | End: 2021-01-27 | Stop reason: DRUGHIGH

## 2020-07-29 RX ORDER — VENLAFAXINE HYDROCHLORIDE 37.5 MG/1
37.5 CAPSULE, EXTENDED RELEASE ORAL DAILY
Qty: 90 CAPSULE | Refills: 1 | Status: SHIPPED | OUTPATIENT
Start: 2020-07-29 | End: 2020-10-07

## 2020-07-29 ASSESSMENT — ENCOUNTER SYMPTOMS
SHORTNESS OF BREATH: 0
CONSTIPATION: 0
ABDOMINAL PAIN: 0
VOMITING: 0
COUGH: 0
CHEST TIGHTNESS: 0
WHEEZING: 0
NAUSEA: 0
DIARRHEA: 0

## 2020-07-29 ASSESSMENT — ANXIETY QUESTIONNAIRES
6. BECOMING EASILY ANNOYED OR IRRITABLE: 1-SEVERAL DAYS
7. FEELING AFRAID AS IF SOMETHING AWFUL MIGHT HAPPEN: 0-NOT AT ALL
4. TROUBLE RELAXING: 1-SEVERAL DAYS
5. BEING SO RESTLESS THAT IT IS HARD TO SIT STILL: 1-SEVERAL DAYS
2. NOT BEING ABLE TO STOP OR CONTROL WORRYING: 0-NOT AT ALL
1. FEELING NERVOUS, ANXIOUS, OR ON EDGE: 1-SEVERAL DAYS
GAD7 TOTAL SCORE: 4
3. WORRYING TOO MUCH ABOUT DIFFERENT THINGS: 0-NOT AT ALL

## 2020-07-29 ASSESSMENT — PATIENT HEALTH QUESTIONNAIRE - PHQ9
SUM OF ALL RESPONSES TO PHQ QUESTIONS 1-9: 2
SUM OF ALL RESPONSES TO PHQ9 QUESTIONS 1 & 2: 2
1. LITTLE INTEREST OR PLEASURE IN DOING THINGS: 1
SUM OF ALL RESPONSES TO PHQ QUESTIONS 1-9: 2
2. FEELING DOWN, DEPRESSED OR HOPELESS: 1

## 2020-07-29 NOTE — ASSESSMENT & PLAN NOTE
Weight up from last visit. No formal exercise. Not checking blood sugar. Walking 20k steps at work. Eating \"ice cream, lots of it\".

## 2020-07-29 NOTE — PROGRESS NOTES
Social Needs    Financial resource strain: None    Food insecurity     Worry: None     Inability: None    Transportation needs     Medical: None     Non-medical: None   Tobacco Use    Smoking status: Former Smoker     Packs/day: 1.00     Years: 20.00     Pack years: 20.00     Types: Cigarettes     Last attempt to quit:      Years since quittin.5    Smokeless tobacco: Never Used   Substance and Sexual Activity    Alcohol use: No    Drug use: No    Sexual activity: Yes     Partners: Male   Lifestyle    Physical activity     Days per week: None     Minutes per session: None    Stress: None   Relationships    Social connections     Talks on phone: None     Gets together: None     Attends Christianity service: None     Active member of club or organization: None     Attends meetings of clubs or organizations: None     Relationship status: None    Intimate partner violence     Fear of current or ex partner: None     Emotionally abused: None     Physically abused: None     Forced sexual activity: None   Other Topics Concern    None   Social History Narrative    None         Pre-diabetes  Weight up from last visit. No formal exercise. Not checking blood sugar. Walking 20k steps at work. Eating \"ice cream, lots of it\". Essential hypertension  Stable. Patient denies any exertional chest pain, dyspnea, palpitations, syncope, orthopnea, edema or paroxysmal nocturnal dyspnea. Anxiety  Complains of situational anxiety. Her  moved to [place] for a job in April. He is there during the week and home on the weekends. 17 yo daughter here with patient, but does not have 's license. She has \"more on my plate\". Complains of trouble sleeping. She has a consistent bedtime. Takes melatonin. Falls asleep okay. Will wake up to use the restroom and have difficulty going back to sleep. Does not want to adjust medication.    THERESE 7 SCORE 2020   THERESE-7 Total Score 4 11 8 11     Interpretation of THERESE-7 score: 5-9 = mild anxiety, 10-14 = moderate anxiety, 15+ = severe anxiety. Recommend referral to behavioral health for scores 10 or greater. PHQ Scores 7/29/2020 2/26/2020 1/24/2020 12/12/2019 10/23/2019 9/25/2019 8/22/2019   PHQ2 Score 2 2 0 0 0 1 2   PHQ9 Score 2 2 0 0 0 1 2     Interpretation of Total Score Depression Severity: 1-4 = Minimal depression, 5-9 = Mild depression, 10-14 = Moderate depression, 15-19 = Moderately severe depression, 20-27 = Severe depression         Review of Systems   Constitutional: Negative for appetite change, chills, fatigue and unexpected weight change. Respiratory: Negative for cough, chest tightness, shortness of breath and wheezing. Cardiovascular: Negative for chest pain, palpitations and leg swelling. Gastrointestinal: Negative for abdominal pain, constipation, diarrhea, nausea and vomiting. Musculoskeletal: Negative for arthralgias. Neurological: Positive for numbness. Negative for weakness and headaches. Hematological: Negative for adenopathy. Psychiatric/Behavioral: The patient is nervous/anxious. OBJECTIVE:    /70 (Site: Left Upper Arm, Position: Sitting, Cuff Size: Large Adult)   Pulse 73   Temp 97.3 °F (36.3 °C) (Infrared)   Resp 16   Wt 260 lb 3.2 oz (118 kg)   SpO2 97%   BMI 42.64 kg/m²     Physical Exam  Constitutional:       Appearance: Normal appearance. She is well-developed. HENT:      Head: Normocephalic and atraumatic. Right Ear: Hearing normal.      Left Ear: Hearing normal.      Nose: Nose normal.      Mouth/Throat:      Mouth: Mucous membranes are moist.   Neck:      Musculoskeletal: Normal range of motion and neck supple. Vascular: No carotid bruit or JVD. Comments: Negative spurling  Cardiovascular:      Rate and Rhythm: Normal rate and regular rhythm. Heart sounds: Normal heart sounds. No murmur. No friction rub. No gallop.     Pulmonary:      Effort: Pulmonary effort is normal. No respiratory distress. Breath sounds: Normal breath sounds. No wheezing, rhonchi or rales. Abdominal:      Palpations: Abdomen is soft. Musculoskeletal: Normal range of motion. Cervical back: She exhibits tenderness (left posterior). She exhibits normal range of motion, no bony tenderness, no swelling, no edema, no deformity and no laceration. Right lower leg: Edema (mild) present. Left lower leg: Edema (mild) present. Comments: Negative spurling   Skin:     General: Skin is warm and dry. Neurological:      General: No focal deficit present. Mental Status: She is alert and oriented to person, place, and time. Comments: Positive bilateral phalen  Negative bilateral arm raise  Positive right tinel     Psychiatric:         Mood and Affect: Mood normal.         Behavior: Behavior normal. Behavior is cooperative. Thought Content: Thought content normal.         ASSESSMENT/PLAN:    1. Anxiety  Discussed meditation, deep breathing, mindfulness techniques. Discussed exercise   - venlafaxine (EFFEXOR XR) 75 MG extended release capsule; TAKE 1 CAPSULE BY MOUTH ONCE DAILY  Dispense: 90 capsule; Refill: 1  - venlafaxine (EFFEXOR XR) 37.5 MG extended release capsule; Take 1 capsule by mouth daily With 75 mg capsule  Dispense: 90 capsule; Refill: 1    2. Essential hypertension  - hydroCHLOROthiazide (HYDRODIURIL) 50 MG tablet; Take 1 tablet by mouth daily  Dispense: 90 tablet; Refill: 3    3. Pre-diabetes  The patient is asked to make an attempt to improve diet and exercise patterns to aid in medical management of this problem. - HEMOGLOBIN A1C; Future  - Comprehensive Metabolic Panel; Future    4. Numbness and tingling in both hands  Referred for EMG  - Melody Vargas MD, Physical Medicine, [place]    5. H/O hyperthyroidism  monitor  - TSH with Reflex; Future    6. Colon cancer screening  - Cologuard (For External Results Only); Future    7.

## 2020-07-30 LAB
ESTIMATED AVERAGE GLUCOSE: 131.2 MG/DL
HBA1C MFR BLD: 6.2 %

## 2020-07-30 NOTE — ASSESSMENT & PLAN NOTE
Complains of situational anxiety. Her  moved to [place] for a job in April. He is there during the week and home on the weekends. 17 yo daughter here with patient, but does not have 's license. She has \"more on my plate\". Complains of trouble sleeping. She has a consistent bedtime. Takes melatonin. Falls asleep okay. Will wake up to use the restroom and have difficulty going back to sleep. Does not want to adjust medication. THERESE 7 SCORE 7/29/2020 4/1/2020 9/25/2019 7/18/2019   THERESE-7 Total Score 4 11 8 11     Interpretation of THERESE-7 score: 5-9 = mild anxiety, 10-14 = moderate anxiety, 15+ = severe anxiety. Recommend referral to behavioral health for scores 10 or greater.   PHQ Scores 7/29/2020 2/26/2020 1/24/2020 12/12/2019 10/23/2019 9/25/2019 8/22/2019   PHQ2 Score 2 2 0 0 0 1 2   PHQ9 Score 2 2 0 0 0 1 2     Interpretation of Total Score Depression Severity: 1-4 = Minimal depression, 5-9 = Mild depression, 10-14 = Moderate depression, 15-19 = Moderately severe depression, 20-27 = Severe depression

## 2020-08-04 ENCOUNTER — TELEPHONE (OUTPATIENT)
Dept: PHYSICAL MEDICINE AND REHAB | Age: 50
End: 2020-08-04

## 2020-08-07 ENCOUNTER — OFFICE VISIT (OUTPATIENT)
Dept: PHYSICAL MEDICINE AND REHAB | Age: 50
End: 2020-08-07
Payer: COMMERCIAL

## 2020-08-07 VITALS — TEMPERATURE: 96.6 F

## 2020-08-07 PROCEDURE — 95886 MUSC TEST DONE W/N TEST COMP: CPT | Performed by: PHYSICAL MEDICINE & REHABILITATION

## 2020-08-07 PROCEDURE — 95911 NRV CNDJ TEST 9-10 STUDIES: CPT | Performed by: PHYSICAL MEDICINE & REHABILITATION

## 2020-08-07 NOTE — LETTER
August 07, 2020        SCOOTER Oneil - CNP  [facility]  [address] [place] [address]          Patient Name: Avery Bush   MRN Number: [id number]   YOB: 1970   Date Of Visit: 08/07/2020        Dear Marilou Taylor CNP,       Thank you for referring Avery Bush to me for electrodiagnostic testing. Attached are the findings of the EMG and my assessment. If you have any further questions, please do not hesitate to call me. Thank you for this interesting referral.      Sincerely,          LULU Eckert MD.

## 2020-08-07 NOTE — PROGRESS NOTES
Normal None Normal   Infraspinatus Normal None Normal Normal None Normal   Deltoid   Normal None Normal Normal None Normal   Biceps   Normal None Normal Normal None Normal   Triceps   Normal None Normal Normal None Normal   Pronator Teres Normal None Normal Normal None Normal   Extensor Indicis Normal None Normal Normal None Normal   1st Dorsal Interosseus Normal None Normal Normal None Normal   ADM Normal None Normal Normal None Normal   Abductor Pollicis Br. Increased +++ Dec #, Polys Normal None Polys       FINDINGS:   EMG of the cervical paraspinals and the upper limbs demonstrated no proximal abnormalities. Significant muscle membrane irritability was identified within the right APB muscle. In that muscle motor units were reduced in number and polyphasic. Some poly-phasicity was seen in the left APB muscle as well. Median sensory and motor latencies were significantly delayed across each wrist.  The right median motor evoked amplitude was less than expected. Ulnar sensory conductions were normal.  The right ulnar motor conduction velocity around the elbow segment was mildly slowed. IMPRESSION:      1. Abnormal EMG. 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 2.  Mild left carpal tunnel syndrome. 3.  Minor ulnar nerve motor neuropathy at the right elbow. 4.  No evidence of a concurrent spinal nerve root lesion (radiculopathy), plexopathy, generalized neuropathy or primary muscle disease.        Thank you for this interesting referral.

## 2020-08-11 ENCOUNTER — HOSPITAL ENCOUNTER (OUTPATIENT)
Dept: INFUSION THERAPY | Age: 50
Discharge: HOME | End: 2020-08-11
Payer: COMMERCIAL

## 2020-08-11 ENCOUNTER — INITIAL CONSULT (OUTPATIENT)
Dept: ONCOLOGY | Age: 50
End: 2020-08-11
Payer: COMMERCIAL

## 2020-08-11 VITALS
HEIGHT: 66 IN | DIASTOLIC BLOOD PRESSURE: 85 MMHG | HEART RATE: 75 BPM | WEIGHT: 254.6 LBS | TEMPERATURE: 97.1 F | SYSTOLIC BLOOD PRESSURE: 138 MMHG | BODY MASS INDEX: 40.92 KG/M2 | OXYGEN SATURATION: 97 %

## 2020-08-11 LAB
BASOPHILS ABSOLUTE: 0 K/CU MM
BASOPHILS RELATIVE PERCENT: 0.3 % (ref 0–1)
DIFFERENTIAL TYPE: ABNORMAL
EOSINOPHILS ABSOLUTE: 0.6 K/CU MM
EOSINOPHILS RELATIVE PERCENT: 4.4 % (ref 0–3)
ERYTHROCYTE SEDIMENTATION RATE: 28 MM/HR (ref 0–30)
HCT VFR BLD CALC: 42.7 % (ref 37–47)
HEMOGLOBIN: 14.6 GM/DL (ref 12.5–16)
LYMPHOCYTES ABSOLUTE: 3 K/CU MM
LYMPHOCYTES RELATIVE PERCENT: 23.4 % (ref 24–44)
MCH RBC QN AUTO: 29.4 PG (ref 27–31)
MCHC RBC AUTO-ENTMCNC: 34.2 % (ref 32–36)
MCV RBC AUTO: 85.9 FL (ref 78–100)
MONOCYTES ABSOLUTE: 1.2 K/CU MM
MONOCYTES RELATIVE PERCENT: 9.2 % (ref 0–4)
PDW BLD-RTO: 13.2 % (ref 11.7–14.9)
PLATELET # BLD: 270 K/CU MM (ref 140–440)
PMV BLD AUTO: 9.6 FL (ref 7.5–11.1)
RBC # BLD: 4.97 M/CU MM (ref 4.2–5.4)
SEGMENTED NEUTROPHILS ABSOLUTE COUNT: 8.1 K/CU MM
SEGMENTED NEUTROPHILS RELATIVE PERCENT: 62.7 % (ref 36–66)
WBC # BLD: 12.8 K/CU MM (ref 4–10.5)

## 2020-08-11 PROCEDURE — 99204 OFFICE O/P NEW MOD 45 MIN: CPT | Performed by: INTERNAL MEDICINE

## 2020-08-11 PROCEDURE — 99201 HC NEW PT, OUTPT VISIT LEVEL 1: CPT

## 2020-08-11 PROCEDURE — 36415 COLL VENOUS BLD VENIPUNCTURE: CPT

## 2020-08-11 PROCEDURE — 85652 RBC SED RATE AUTOMATED: CPT

## 2020-08-11 PROCEDURE — 85025 COMPLETE CBC W/AUTO DIFF WBC: CPT

## 2020-08-11 ASSESSMENT — PATIENT HEALTH QUESTIONNAIRE - PHQ9
SUM OF ALL RESPONSES TO PHQ9 QUESTIONS 1 & 2: 2
SUM OF ALL RESPONSES TO PHQ QUESTIONS 1-9: 2
1. LITTLE INTEREST OR PLEASURE IN DOING THINGS: 1
2. FEELING DOWN, DEPRESSED OR HOPELESS: 1
SUM OF ALL RESPONSES TO PHQ QUESTIONS 1-9: 2

## 2020-08-11 NOTE — PROGRESS NOTES
Patient Name:  Yisel Meredith  Patient :  1970  Patient MRN:  [id number]     Primary Oncologist: Marah Howe MD  Referring Provider: SCOOTER Conte - CNP     Date of Service: 2020     Reason for Consult:    She was referred for evaluation of leukocytosis. She came in with her sister. Chief Complaint:    Chief Complaint   Patient presents with    New Patient     blood disorder        Patient Active Problem List:     Pre-diabetes     Essential hypertension     Anxiety     Rash       HPI:   Soraida Sung is a pleasant 58-year-old  female patient who was referred for evaluation of leukocytosis. I reviewed her blood test record. CBC in 2019 was unremarkable with WBC 9.6, hemoglobin 14.5, platelet 425. In May 2019 WBC was 13.6, hemoglobin 14.9, platelet 192. In 2020 WBC was 11.3, hemoglobin 14.1, platelet 996. In 2019 WBC was 12.7, hemoglobin 14, platelet 326. She has 2 cats and 2 dogs. She has mammogram every 2 years. Mammogram in 2019 was benign. She has Cologuard. On today visit I will order CBC with manual differential, ESR, flow cytometry study, RT-PCR for BCR abl  Past Medical History: Allergies, depression/anxiety, diabetes, hypertension, obesity, headache, history of sinusitis. Past Surgery History:    Appendectomy due to appendicitis in ,  , hysterectomy , laminectomy , tonsillectomy in  or . Social History:   She smoked 1 pack/day for 20 years and quit in . She his usage in the early  denies any alcohol or illicit drug use. She has 1 daughter. Family History:   Maternal grandmother had possible colon cancer in her 79. Paternal and maternal aunt had breast cancer.       Allergies   Allergen Reactions    Pcn [Penicillins] Hives     Any derivative       Current Outpatient Medications on File Prior to Visit   Medication Sig Dispense Refill    venlafaxine (EFFEXOR XR) 75 MG extended release capsule TAKE 1 CAPSULE BY MOUTH ONCE DAILY 90 capsule 1    venlafaxine (EFFEXOR XR) 37.5 MG extended release capsule Take 1 capsule by mouth daily With 75 mg capsule 90 capsule 1    hydroCHLOROthiazide (HYDRODIURIL) 50 MG tablet Take 1 tablet by mouth daily 90 tablet 3     No current facility-administered medications on file prior to visit. Review of Systems:    Constitutional:  No weight loss, No fever, No chills, No night sweats. Energy level good. Eyes:  No diplopia, No transient or permanent loss of vision, No scotomata. ENT / Mouth:  No epistaxis, No dysphagia, No hoarseness, No oral ulcers, No gingival bleeding. No sore throat, No postnasal drip, No nasal drip, No mouth pain, No sinus pain, No tinnitus, Normal hearing. Cardiovascular:  No chest pain, No palpitations, No syncope, No upper extremity edema, No lower extremity edema, No calf discomfort. Respiratory:  No cough. No hemoptysis, No pleurisy, No wheezing, No dyspnea. Breast:  No breast mass, No pain, No nipple discharge, No change in size, No change in shape. Gastrointestinal:  No abdominal pain, No abdominal cramping, No nausea, No vomiting, No constipation, No diarrhea, No hematochezia, No melena, No jaundice, No dyspepsia, No dysphagia. Urinary:  No dysuria, No hematuria, No urinary incontinence. Gynecological:  No vaginal discharge, No suprapubic pain, No abnormal vaginal bleeding. (Female Patients Only). Status post hysterectomy. Musculoskeletal:  No muscle pain, No swollen joints, No joint redness, No bone pain, No spine tenderness. Skin:  No rash, No nodules, No pruritus, No lesions. Neurologic:  No confusion, No seizures, No syncope, No tremor, No speech change, No headache, No hiccups, No abnormal gait, No sensory changes, No weakness. Psychiatric:  No depression, No anxiety, Concentration normal.  Endocrine:  No polyuria, No polydipsia, No hot flashes, No thyroid symptoms.   Hematologic:  No epistaxis, No gingival bleeding, No petechiae, No ecchymosis. Lymphatic:  No lymphadenopathy, No lymphedema. Allergy / Immunologic:  No eczema, No frequent mucous infections, No frequent respiratory infections, No recurrent urticarial, No frequent skin infections. Vital Signs: /85 (Site: Right Upper Arm, Position: Sitting, Cuff Size: Large Adult)   Pulse 75   Temp 97.1 °F (36.2 °C) (Tympanic)   Ht 5' 5.5\" (1.664 m)   Wt 254 lb 9.6 oz (115.5 kg)   SpO2 97%   BMI 41.72 kg/m²      Physical Exam:  CONSTITUTIONAL: awake, alert, cooperative, no apparent distress   EYES: pupils equal, round and reactive to light, sclera clear and conjunctiva normal  ENT: Normocephalic, without obvious abnormality, atraumatic  NECK: supple, symmetrical, no jugular venous distension and no carotid bruits   HEMATOLOGIC/LYMPHATIC: no cervical, supraclavicular or axillary lymphadenopathy   LUNGS: no increased work of breathing and clear to auscultation   CARDIOVASCULAR: regular rate and rhythm, normal S1 and S2, no murmur noted  ABDOMEN: normal bowel sounds x 4, soft, non-distended, non-tender, no masses palpated, no hepatosplenomegaly   MUSCULOSKELETAL: full range of motion noted, tone is normal  NEUROLOGIC: awake, alert, oriented to name, place and time. Motor skills grossly intact. SKIN: Normal skin color, texture, turgor and no jaundice. appears intact   EXTREMITIES: no LE edema. No cyanosis.         Labs:  Hematology:  Lab Results   Component Value Date    WBC 12.8 (H) 08/11/2020    RBC 4.97 08/11/2020    HGB 14.6 08/11/2020    HCT 42.7 08/11/2020    MCV 85.9 08/11/2020    MCH 29.4 08/11/2020    MCHC 34.2 08/11/2020    RDW 13.2 08/11/2020     08/11/2020    MPV 9.6 08/11/2020    SEGSPCT 62.7 08/11/2020    EOSRELPCT 4.4 (H) 08/11/2020    BASOPCT 0.3 08/11/2020    LYMPHOPCT 23.4 (L) 08/11/2020    MONOPCT 9.2 (H) 08/11/2020    SEGSABS 8.1 08/11/2020    EOSABS 0.6 08/11/2020    BASOSABS 0.0 08/11/2020    LYMPHSABS 3.0 08/11/2020    MONOSABS 1.2 08/11/2020    DIFFTYPE AUTOMATED DIFFERENTIAL 08/11/2020     Lab Results   Component Value Date    ESR 28 08/11/2020       Chemistry:  Lab Results   Component Value Date     07/29/2020    K 3.5 07/29/2020    CL 97 (L) 07/29/2020    CO2 28 07/29/2020    BUN 26 (H) 07/29/2020    CREATININE 0.7 07/29/2020    GLUCOSE 101 (H) 07/29/2020    CALCIUM 9.6 07/29/2020    PROT 7.1 07/29/2020    LABALBU 4.3 07/29/2020    BILITOT <0.2 07/29/2020    ALKPHOS 130 (H) 07/29/2020    AST 23 07/29/2020    ALT 20 07/29/2020    LABGLOM >60 07/29/2020    GFRAA >60 07/29/2020    AGRATIO 1.5 07/29/2020    GLOB 2.8 07/29/2020    POCGLU 131 (H) 05/31/2019     No results found for: MMA, LDH, HOMOCYSTEINE  No components found for: LD  Lab Results   Component Value Date    TSHHS 1.190 04/18/2016    T4FREE 1.26 04/18/2016       Immunology:  Lab Results   Component Value Date    PROT 7.1 07/29/2020        Observations:  PHQ-9 Total Score: 2 (8/11/2020  3:04 PM)       Assessment & Plan:    1. She has nonspecific mild leukocytosis which could be constitutional to her. I will order CBC with manual differential, flow cytometry, RT-PCR for BCR abl, and ESR. If this study come back negative and WBC continues to rise, I will consider bone marrow study. 2.  I advised to keep age-appropriate cancer screening up-to-date. 3.  We discussed about healthy diet and lifestyle. Return to clinic in  3-4 weeks or sooner. All of her questions has been answered for today. I have discussed the above stated plan with the patient and they verbalized understanding and agreed with the plan. Thank you for allowing us to participate in this patient's care.       Electronically signed by Soila Nunn MD on 8/11/20 at 8:56 AM EDT

## 2020-08-11 NOTE — PROGRESS NOTES
MA Rooming Questions  Patient: Linden Suazo  MRN: [id number]    Date: 8/11/2020        5. Did the patient have a PHQ-9 collected today? Yes  PHQ-9 Total Score: 2 (8/11/2020  3:04 PM)      PHQ-9 Given to: Dr. José Luis Marsh              PHQ-9 Score:                     [] Positive     [x]  Negative              Does question #9 need addressed?                      [] Yes    []  No               Sumi Stanford MA

## 2020-08-18 ENCOUNTER — TELEPHONE (OUTPATIENT)
Dept: FAMILY MEDICINE CLINIC | Age: 50
End: 2020-08-18

## 2020-08-19 NOTE — TELEPHONE ENCOUNTER
EMG shows moderately severe right carpal tunnel syndrome and mild left carpal tunnel syndrome. Minor ulnar nerve neuropathy at right elbow. We can refer to ortho.

## 2020-08-25 ENCOUNTER — OFFICE VISIT (OUTPATIENT)
Dept: ORTHOPEDIC SURGERY | Age: 50
End: 2020-08-25
Payer: COMMERCIAL

## 2020-08-25 VITALS — BODY MASS INDEX: 40.82 KG/M2 | WEIGHT: 254 LBS | HEIGHT: 66 IN | RESPIRATION RATE: 16 BRPM

## 2020-08-25 PROCEDURE — 99203 OFFICE O/P NEW LOW 30 MIN: CPT | Performed by: ORTHOPAEDIC SURGERY

## 2020-08-25 RX ORDER — IBUPROFEN 200 MG
200 TABLET ORAL EVERY 6 HOURS PRN
COMMUNITY
End: 2021-04-08

## 2020-08-25 ASSESSMENT — ENCOUNTER SYMPTOMS
WHEEZING: 0
EYE PAIN: 0
VOMITING: 0
CHEST TIGHTNESS: 0
COLOR CHANGE: 0
SHORTNESS OF BREATH: 0
EYE REDNESS: 0

## 2020-08-25 NOTE — PATIENT INSTRUCTIONS
Right ulnar nerve decompression, Right Carpal tunnel release, Right wrist volar ganglion cyst removal discussed   Will proceed with surgery on the right and then when ready will proceed with left carpal tunnel release and left ganglion cyst removal   No meds list provided   Consent to be signed day of surgery   Obtain any clearances as needed, as discussed   Follow up for pre-op testing(COVID)/surgery as discussed     Call office with any questions ([facility] surgery scheduler) [phone]

## 2020-08-25 NOTE — PROGRESS NOTES
8/25/2020   Chief Complaint   Patient presents with    Carpal Tunnel     bilateral carpal tunnel R>L    Elbow Pain     Right         History of Present Illness:                             Cici Vasquez is a 44 y.o. female who presents today for evaluation of her bilateral right worse than left hand pain numbness and tingling with painful cysts at bilateral wrists volarly. Her right side is more severe than the left with the numbness and tingling and weakness issues. Her symptoms have been present for years but have been getting progressively severe and worsening for the last 6 months. She does not recall any injuries but does have increased symptoms with repetitive use such as gripping and lifting activities especially while working. The right side has numbness specifically most severe at the middle and ring fingers and is present constantly. Her symptoms get worse with prolonged gripping activities. She also complains of numbness and tingling into her left hand along the middle finger. She has been having worsening symptoms in her volar aspect of the wrist overlying prominent areas of cystic formation on the left wrist worse than the right. She is tried treating these with wrist braces which seem to help initially but her symptoms return when she has to be active at work. Patient here for a new patient consult per SCOOTER Montenegro CNP for evaluation of bilateral carpal tunnel syndrome. She states right is worse than left. She is right hand dominant and states onset was about 6 months ago with NKI and no direct cause other than possible repetitive use. She is having numbness in her right hand ring and middle fingertips into her wrist with pain into her elbow. With Mild symptoms on her left wrist/hand. She also has painful cysts on bilateral volar aspect of wrists. She states they have been intermittent for years and have come to the point where they are interfering with ADL's.  She is a Smoker     Packs/day: 1.00     Years: 20.00     Pack years: 20.00     Types: Cigarettes     Last attempt to quit:      Years since quittin.6    Smokeless tobacco: Never Used   Substance and Sexual Activity    Alcohol use: No    Drug use: No    Sexual activity: Yes     Partners: Male   Lifestyle    Physical activity     Days per week: None     Minutes per session: None    Stress: None   Relationships    Social connections     Talks on phone: None     Gets together: None     Attends Sabianist service: None     Active member of club or organization: None     Attends meetings of clubs or organizations: None     Relationship status: None    Intimate partner violence     Fear of current or ex partner: None     Emotionally abused: None     Physically abused: None     Forced sexual activity: None   Other Topics Concern    None   Social History Narrative    None     Current Outpatient Medications   Medication Sig Dispense Refill    ibuprofen (ADVIL;MOTRIN) 200 MG tablet Take 200 mg by mouth every 6 hours as needed for Pain      venlafaxine (EFFEXOR XR) 75 MG extended release capsule TAKE 1 CAPSULE BY MOUTH ONCE DAILY 90 capsule 1    venlafaxine (EFFEXOR XR) 37.5 MG extended release capsule Take 1 capsule by mouth daily With 75 mg capsule 90 capsule 1    hydroCHLOROthiazide (HYDRODIURIL) 50 MG tablet Take 1 tablet by mouth daily 90 tablet 3     No current facility-administered medications for this visit. Allergies   Allergen Reactions    Pcn [Penicillins] Hives     Any derivative       Review of Systems:   Review of Systems   Constitutional: Negative for chills and fever. HENT: Negative for congestion and sneezing. Eyes: Negative for pain and redness. Respiratory: Negative for chest tightness, shortness of breath and wheezing. Cardiovascular: Negative for chest pain and palpitations. Gastrointestinal: Negative for vomiting. Musculoskeletal: Positive for arthralgias.    Skin: Negative for color change and rash. Neurological: Positive for weakness and numbness. Psychiatric/Behavioral: Negative for agitation. The patient is not nervous/anxious. Examination:  General Exam:  Vitals: Resp 16   Ht 5' 5.5\" (1.664 m)   Wt 254 lb (115.2 kg)   BMI 41.62 kg/m²    Physical Exam  Constitutional:       Appearance: Normal appearance. HENT:      Head: Normocephalic and atraumatic. Eyes:      Extraocular Movements: Extraocular movements intact. Pupils: Pupils are equal, round, and reactive to light. Neck:      Musculoskeletal: Normal range of motion. Pulmonary:      Effort: Pulmonary effort is normal.   Musculoskeletal:      Right elbow: She exhibits normal range of motion, no swelling, no effusion and no deformity. Tenderness found. Medial epicondyle tenderness noted. Left elbow: She exhibits normal range of motion, no swelling, no effusion and no deformity. No tenderness found. Right forearm: Normal.      Left forearm: Normal.      Comments: Right Upper Extremity:    There is mild tenderness to palpation of the volar aspect of the wrist at the level of the carpal tunnel. There is decreased sensation to light touch in the median nerve distribution. Sensation is intact to light touch along the ulnar and radial nerves. Positive carpal compression test and Phalen's test.  There is mild relative weakness with 4+ strength out of 5 during  test, pinch test, and flexor pollicis brevis. Range of motion of the wrist and fingers is intact without limitation. Skin is intact without wounds or rash. 2+ radial pulse with brisk cap refill throughout the fingers. No atrophy of the thenar musculature. Strength 5/5 in finger and wrist flexion and extension. Positive ulnar stretch test andThere is tenderness to palpation along the course of the ulnar nerve at the posterior aspect of the medial elbow.   There is moderate tenderness to palpation along a prominent cystic structure at the volar aspect of the wrist along the radial aspect adjacent to the radial artery. The cyst measures approximately 10 x 15 mm and is adjacent to the radiocarpal joint    Left Upper Extremity:    There is mild tenderness to palpation of the volar aspect of the wrist at the level of the carpal tunnel. There is decreased sensation to light touch in the median nerve distribution. Sensation is intact to light touch along the ulnar and radial nerves. Positive carpal compression test and Phalen's test.  There is mild relative weakness with 4+ strength out of 5 during  test, pinch test, and flexor pollicis brevis. Range of motion of the wrist and fingers is intact without limitation. Skin is intact without wounds or rash. 2+ radial pulse with brisk cap refill throughout the fingers. No atrophy of the thenar musculature. Strength 5/5 in finger and wrist flexion and extension. There is moderate tenderness to palpation along a prominent cystic structure at the volar aspect of the wrist along the radial aspect adjacent to the radial artery. The cyst measures approximately 10 x 15 mm and is adjacent to the radiocarpal joint         Skin:     General: Skin is warm and dry. Neurological:      General: No focal deficit present. Mental Status: She is alert and oriented to person, place, and time. Psychiatric:         Mood and Affect: Mood normal.            Office Procedures:  No orders of the defined types were placed in this encounter. Assessment and Plan  1. Right worse than left carpal tunnel syndrome    2. Bilateral symptomatic volar wrist ganglion cysts    3. Right cubital tunnel syndrome    The exam and history are consistent with carpal syndrome and cubital tunnel syndrome in the right and carpal tunnel syndrome on the left. Additionally she is dealing with symptomatic bilateral volar wrist ganglion cysts. We discussed treatment options for this. Conservative measures have not been successful, including activity modification, rest, over-the-counter anti-inflammatory medications, and night bracing. Due to the severity of symptoms I have recommended surgical intervention. This will consist of a carpal tunnel release, ulnar nerve decompression at the elbow, and surgical excision of the volar wrist ganglion cyst.    We discussed the risks, benefits, and alternatives to surgery as well as the postoperative course. Risks include persistent pain, numbness, weakness, infection, stiffness, recurrence, and incomplete recovery of the nerve. The patient understands and would like to proceed with surgery. We also discussed her left upper extremity. She would like to stage surgery and have the left carpal tunnel release and left ganglion cyst excised 2 weeks following surgery on her right side. The patient was counseled at length about the risks of kathia Covid-19 during their perioperative period and any recovery window from their procedure. The patient was made aware that kathia Covid-19  may worsen their prognosis for recovering from their procedure  and lend to a higher morbidity and/or mortality risk. All material risks, benefits, and reasonable alternatives including postponing the procedure were discussed. The patient does wish to proceed with the procedure at this time.         Electronically signed by Leandra Booth MD on 8/25/2020 at 4:31 PM

## 2020-08-25 NOTE — PROGRESS NOTES
Patient here for a new patient consult per SCOOTER Melgoza CNP for evaluation of bilateral carpal tunnel syndrome. She states right is worse than left. She is right hand dominant and states onset was about 6 months ago with NKI and no direct cause other than possible repetitive use. She is having numbness in her right hand ring and middle fingertips into her wrist with pain into her elbow. With Mild symptoms on her left wrist/hand. She also has painful cysts on bilateral volar aspect of wrists. She states they have been intermittent for years and have come to the point where they are interfering with ADL's. She is a  at [employer]. She reports no consecutive treatment and no surgeries on either extremity. See EMG results below. Provider needs to conduct a hands on physical today due to patients injury/diagnosis        EMG IMPRESSION 8/7/2020:                  1.  Abnormal EMG. 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                  2.  Mild left carpal tunnel syndrome.                 3.  Minor ulnar nerve motor neuropathy at the right elbow.                 4.  No evidence of a concurrent spinal nerve root lesion (radiculopathy), plexopathy, generalized neuropathy or primary muscle disease.

## 2020-08-31 ENCOUNTER — TELEPHONE (OUTPATIENT)
Dept: ORTHOPEDIC SURGERY | Age: 50
End: 2020-08-31

## 2020-08-31 NOTE — TELEPHONE ENCOUNTER
Called patient's insurance spoke with [facility]. Cpt codes: [id number], [id number], [id number] no not require prior auth. They are a 1 and 1 day allowed with 90 day global pre op and post op.   Ref# [id number]

## 2020-09-01 ENCOUNTER — TELEPHONE (OUTPATIENT)
Dept: ORTHOPEDIC SURGERY | Age: 50
End: 2020-09-01

## 2020-09-01 ENCOUNTER — ANESTHESIA EVENT (OUTPATIENT)
Dept: OPERATING ROOM | Age: 50
End: 2020-09-01
Payer: COMMERCIAL

## 2020-09-01 NOTE — TELEPHONE ENCOUNTER
Left message  Pre testing / covid testing is 9/4/2020 at 8 am at [address] [facility]   Pre testing number is [phone] if she is unable to keep that appointment

## 2020-09-01 NOTE — ANESTHESIA PRE PROCEDURE
Department of Anesthesiology  Preprocedure Note       Name:  Delfina rGoves   Age:  44 y.o.  :  1970                                          MRN:  [id number]         Date:  2020      Surgeon: Moody Lowe):  Rangel Rose MD    Procedure: Procedure(s):  RIGHT CARPAL TUNNEL RELEASE  RIGHT GANGLION CYST HAND LESION BIOPSY EXCISION  RIGHT ULNAR NERVE TRANSPOSITION    Medications prior to admission:   Prior to Admission medications    Medication Sig Start Date End Date Taking? Authorizing Provider   ibuprofen (ADVIL;MOTRIN) 200 MG tablet Take 200 mg by mouth every 6 hours as needed for Pain    Historical Provider, MD   venlafaxine (EFFEXOR XR) 75 MG extended release capsule TAKE 1 CAPSULE BY MOUTH ONCE DAILY 20   SCOOTER Palma CNP   venlafaxine (EFFEXOR XR) 37.5 MG extended release capsule Take 1 capsule by mouth daily With 75 mg capsule 20   SCOOTER Palma CNP   hydroCHLOROthiazide (HYDRODIURIL) 50 MG tablet Take 1 tablet by mouth daily 20   SCOOTER Palma CNP       Current medications:    Current Outpatient Medications   Medication Sig Dispense Refill    ibuprofen (ADVIL;MOTRIN) 200 MG tablet Take 200 mg by mouth every 6 hours as needed for Pain      venlafaxine (EFFEXOR XR) 75 MG extended release capsule TAKE 1 CAPSULE BY MOUTH ONCE DAILY 90 capsule 1    venlafaxine (EFFEXOR XR) 37.5 MG extended release capsule Take 1 capsule by mouth daily With 75 mg capsule 90 capsule 1    hydroCHLOROthiazide (HYDRODIURIL) 50 MG tablet Take 1 tablet by mouth daily 90 tablet 3     No current facility-administered medications for this encounter. Allergies:     Allergies   Allergen Reactions    Pcn [Penicillins] Hives     Any derivative       Problem List:    Patient Active Problem List   Diagnosis Code    Pre-diabetes R73.03    Essential hypertension I10    Anxiety F41.9    Rash R21       Past Medical History:        Diagnosis Date    Diabetes mellitus ([facility] [address]     Shu disease     Hypertension     Hyperthyroidism     Migraine     Type 2 diabetes mellitus without complication [facility])        Past Surgical History:        Procedure Laterality Date     SECTION  2003    HYSTERECTOMY  2015    bilateral ovaries not removed    HYSTERECTOMY, VAGINAL      LAMINECTOMY      LAPAROSCOPIC APPENDECTOMY N/A 2019    APPENDECTOMY LAPAROSCOPIC performed by Walter Kelley MD at [address]       Social History:    Social History     Tobacco Use    Smoking status: Former Smoker     Packs/day: 1.00     Years: 20.00     Pack years: 20.00     Types: Cigarettes     Last attempt to quit:      Years since quittin.6    Smokeless tobacco: Never Used   Substance Use Topics    Alcohol use: No                                Counseling given: Not Answered      Vital Signs (Current): There were no vitals filed for this visit.                                            BP Readings from Last 3 Encounters:   20 138/85   20 136/70   20 136/74       NPO Status:                                                                                 BMI:   Wt Readings from Last 3 Encounters:   20 254 lb (115.2 kg)   20 254 lb 9.6 oz (115.5 kg)   20 260 lb 3.2 oz (118 kg)     There is no height or weight on file to calculate BMI.    CBC:   Lab Results   Component Value Date    WBC 12.8 2020    RBC 4.97 2020    HGB 14.6 2020    HCT 42.7 2020    MCV 85.9 2020    RDW 13.2 2020     2020       CMP:   Lab Results   Component Value Date     2020    K 3.5 2020    CL 97 2020    CO2 28 2020    BUN 26 2020    CREATININE 0.7 2020    GFRAA >60 2020    AGRATIO 1.5 2020    LABGLOM >60 2020    GLUCOSE 101 2020    PROT 7.1 2020    CALCIUM 9.6 2020    BILITOT <0.2 2020    ALKPHOS 130 2020    AST 23 07/29/2020    ALT 20 07/29/2020         COVID-19 Screening (If Applicable): No results found for: COVID19      Anesthesia Evaluation  Patient summary reviewed and Nursing notes reviewed  Airway:         Dental:          Pulmonary:                             ROS comment: Former smoker, quit 2007      PAT Airway. Limited exam. COVID 19 precautions. Patient wearing mask  Head/Neck movement: full  History of difficult intubation:  None  Teeth: missing upper and lower molars, reports loose molar lower right   Able to lie flat         COVID 19 screening  Denies recent fever or known COVID 19 exposure. Instructed to quarantine until surgery and report any new respiratory or fever symptoms to surgeon. Aware COVID testing must be completed before DOS. COVID swab:  9/4/2020   Cardiovascular:    (+) hypertension:,                ROS comment:   CP or SOB: NO   Exercise: NO, has physical job     Neuro/Psych:   (+) neuromuscular disease (right CTS, ulnar tunnel, and ganglion cyst s/p andrew, 1997):, headaches (last 8/2020): migraine headaches, depression/anxiety             GI/Hepatic/Renal:   (+) morbid obesity (BMI: 42)         ROS comment: S/p appy 5/2019. Endo/Other:    (+) Diabetes (diet controlled A1c 6.2, 6/2020)Type II DM, , hyperthyroidism (Graves dz)::., .          Pt had PAT visit. ROS comment: S/p hysterectomy Abdominal:           Vascular:                                    Anesthesia Plan        SCOOTER Gregorio - CNP Chart reviewed, pt. Interviewed and examined. Anesthesia Evaluation will follow by a certified practitioner prior to surgery.

## 2020-09-03 LAB
BCR/ABL T(9,22): NORMAL
COMMENT: NORMAL

## 2020-09-04 ENCOUNTER — HOSPITAL ENCOUNTER (OUTPATIENT)
Dept: PREADMISSION TESTING | Age: 50
Discharge: HOME | End: 2020-09-08
Payer: COMMERCIAL

## 2020-09-04 VITALS
SYSTOLIC BLOOD PRESSURE: 153 MMHG | TEMPERATURE: 97 F | RESPIRATION RATE: 16 BRPM | WEIGHT: 258 LBS | HEART RATE: 68 BPM | OXYGEN SATURATION: 97 % | HEIGHT: 66 IN | BODY MASS INDEX: 41.46 KG/M2 | DIASTOLIC BLOOD PRESSURE: 83 MMHG

## 2020-09-04 PROCEDURE — U0002 COVID-19 LAB TEST NON-CDC: HCPCS

## 2020-09-04 PROCEDURE — C9803 HOPD COVID-19 SPEC COLLECT: HCPCS

## 2020-09-04 RX ORDER — DOCUSATE SODIUM 100 MG/1
100 CAPSULE, LIQUID FILLED ORAL 2 TIMES DAILY
COMMUNITY

## 2020-09-04 RX ORDER — AMPICILLIN TRIHYDRATE 250 MG
1200 CAPSULE ORAL DAILY
COMMUNITY
End: 2022-05-25

## 2020-09-04 RX ORDER — MAGNESIUM GLUCONATE 27 MG(500)
250 TABLET ORAL NIGHTLY
COMMUNITY
End: 2022-04-11

## 2020-09-04 RX ORDER — CHOLECALCIFEROL (VITAMIN D3) 125 MCG
500 CAPSULE ORAL DAILY
COMMUNITY
End: 2021-01-27

## 2020-09-04 RX ORDER — FAMOTIDINE 20 MG/1
20 TABLET, FILM COATED ORAL NIGHTLY
COMMUNITY

## 2020-09-04 RX ORDER — YOHIMBE BARK 500 MG
CAPSULE ORAL DAILY
COMMUNITY

## 2020-09-05 LAB
SARS-COV-2: NOT DETECTED
SOURCE: NORMAL

## 2020-09-09 ENCOUNTER — HOSPITAL ENCOUNTER (OUTPATIENT)
Age: 50
Setting detail: OUTPATIENT SURGERY
Discharge: HOME | End: 2020-09-09
Attending: ORTHOPAEDIC SURGERY | Admitting: ORTHOPAEDIC SURGERY
Payer: COMMERCIAL

## 2020-09-09 ENCOUNTER — ANESTHESIA (OUTPATIENT)
Dept: OPERATING ROOM | Age: 50
End: 2020-09-09
Payer: COMMERCIAL

## 2020-09-09 VITALS
OXYGEN SATURATION: 100 % | RESPIRATION RATE: 6 BRPM | TEMPERATURE: 97.7 F | SYSTOLIC BLOOD PRESSURE: 131 MMHG | DIASTOLIC BLOOD PRESSURE: 112 MMHG

## 2020-09-09 VITALS
OXYGEN SATURATION: 98 % | HEART RATE: 81 BPM | RESPIRATION RATE: 16 BRPM | TEMPERATURE: 97.7 F | DIASTOLIC BLOOD PRESSURE: 84 MMHG | SYSTOLIC BLOOD PRESSURE: 126 MMHG

## 2020-09-09 PROCEDURE — 2500000003 HC RX 250 WO HCPCS: Performed by: ORTHOPAEDIC SURGERY

## 2020-09-09 PROCEDURE — 7100000001 HC PACU RECOVERY - ADDTL 15 MIN: Performed by: ORTHOPAEDIC SURGERY

## 2020-09-09 PROCEDURE — 2580000003 HC RX 258: Performed by: NURSE ANESTHETIST, CERTIFIED REGISTERED

## 2020-09-09 PROCEDURE — 7100000011 HC PHASE II RECOVERY - ADDTL 15 MIN: Performed by: ORTHOPAEDIC SURGERY

## 2020-09-09 PROCEDURE — 3700000001 HC ADD 15 MINUTES (ANESTHESIA): Performed by: ORTHOPAEDIC SURGERY

## 2020-09-09 PROCEDURE — 6360000002 HC RX W HCPCS: Performed by: NURSE ANESTHETIST, CERTIFIED REGISTERED

## 2020-09-09 PROCEDURE — 7100000000 HC PACU RECOVERY - FIRST 15 MIN: Performed by: ORTHOPAEDIC SURGERY

## 2020-09-09 PROCEDURE — 7100000010 HC PHASE II RECOVERY - FIRST 15 MIN: Performed by: ORTHOPAEDIC SURGERY

## 2020-09-09 PROCEDURE — 3600000002 HC SURGERY LEVEL 2 BASE: Performed by: ORTHOPAEDIC SURGERY

## 2020-09-09 PROCEDURE — 2580000003 HC RX 258: Performed by: ORTHOPAEDIC SURGERY

## 2020-09-09 PROCEDURE — 64721 CARPAL TUNNEL SURGERY: CPT | Performed by: ORTHOPAEDIC SURGERY

## 2020-09-09 PROCEDURE — 6360000002 HC RX W HCPCS: Performed by: ANESTHESIOLOGY

## 2020-09-09 PROCEDURE — 3700000000 HC ANESTHESIA ATTENDED CARE: Performed by: ORTHOPAEDIC SURGERY

## 2020-09-09 PROCEDURE — 64718 REVISE ULNAR NERVE AT ELBOW: CPT | Performed by: ORTHOPAEDIC SURGERY

## 2020-09-09 PROCEDURE — 3600000012 HC SURGERY LEVEL 2 ADDTL 15MIN: Performed by: ORTHOPAEDIC SURGERY

## 2020-09-09 PROCEDURE — 25111 REMOVE WRIST TENDON LESION: CPT | Performed by: ORTHOPAEDIC SURGERY

## 2020-09-09 PROCEDURE — 2709999900 HC NON-CHARGEABLE SUPPLY: Performed by: ORTHOPAEDIC SURGERY

## 2020-09-09 RX ORDER — KETOROLAC TROMETHAMINE 30 MG/ML
INJECTION, SOLUTION INTRAMUSCULAR; INTRAVENOUS PRN
Status: DISCONTINUED | OUTPATIENT
Start: 2020-09-09 | End: 2020-09-09 | Stop reason: SDUPTHER

## 2020-09-09 RX ORDER — SODIUM CHLORIDE, SODIUM LACTATE, POTASSIUM CHLORIDE, CALCIUM CHLORIDE 600; 310; 30; 20 MG/100ML; MG/100ML; MG/100ML; MG/100ML
INJECTION, SOLUTION INTRAVENOUS CONTINUOUS PRN
Status: DISCONTINUED | OUTPATIENT
Start: 2020-09-09 | End: 2020-09-09 | Stop reason: SDUPTHER

## 2020-09-09 RX ORDER — HYDROMORPHONE HCL 110MG/55ML
0.25 PATIENT CONTROLLED ANALGESIA SYRINGE INTRAVENOUS EVERY 5 MIN PRN
Status: DISCONTINUED | OUTPATIENT
Start: 2020-09-09 | End: 2020-09-09 | Stop reason: HOSPADM

## 2020-09-09 RX ORDER — FENTANYL CITRATE 50 UG/ML
INJECTION, SOLUTION INTRAMUSCULAR; INTRAVENOUS PRN
Status: DISCONTINUED | OUTPATIENT
Start: 2020-09-09 | End: 2020-09-09 | Stop reason: SDUPTHER

## 2020-09-09 RX ORDER — DEXAMETHASONE SODIUM PHOSPHATE 4 MG/ML
INJECTION, SOLUTION INTRA-ARTICULAR; INTRALESIONAL; INTRAMUSCULAR; INTRAVENOUS; SOFT TISSUE PRN
Status: DISCONTINUED | OUTPATIENT
Start: 2020-09-09 | End: 2020-09-09 | Stop reason: SDUPTHER

## 2020-09-09 RX ORDER — PROPOFOL 10 MG/ML
INJECTION, EMULSION INTRAVENOUS PRN
Status: DISCONTINUED | OUTPATIENT
Start: 2020-09-09 | End: 2020-09-09 | Stop reason: SDUPTHER

## 2020-09-09 RX ORDER — HYDROMORPHONE HCL 110MG/55ML
0.5 PATIENT CONTROLLED ANALGESIA SYRINGE INTRAVENOUS EVERY 5 MIN PRN
Status: DISCONTINUED | OUTPATIENT
Start: 2020-09-09 | End: 2020-09-09 | Stop reason: HOSPADM

## 2020-09-09 RX ORDER — LIDOCAINE HYDROCHLORIDE 20 MG/ML
INJECTION, SOLUTION INTRAVENOUS PRN
Status: DISCONTINUED | OUTPATIENT
Start: 2020-09-09 | End: 2020-09-09 | Stop reason: SDUPTHER

## 2020-09-09 RX ORDER — MORPHINE SULFATE 2 MG/ML
2 INJECTION, SOLUTION INTRAMUSCULAR; INTRAVENOUS EVERY 5 MIN PRN
Status: DISCONTINUED | OUTPATIENT
Start: 2020-09-09 | End: 2020-09-09 | Stop reason: HOSPADM

## 2020-09-09 RX ORDER — HYDRALAZINE HYDROCHLORIDE 20 MG/ML
5 INJECTION INTRAMUSCULAR; INTRAVENOUS EVERY 10 MIN PRN
Status: DISCONTINUED | OUTPATIENT
Start: 2020-09-09 | End: 2020-09-09 | Stop reason: HOSPADM

## 2020-09-09 RX ORDER — ONDANSETRON 2 MG/ML
INJECTION INTRAMUSCULAR; INTRAVENOUS PRN
Status: DISCONTINUED | OUTPATIENT
Start: 2020-09-09 | End: 2020-09-09 | Stop reason: SDUPTHER

## 2020-09-09 RX ORDER — FENTANYL CITRATE 50 UG/ML
25 INJECTION, SOLUTION INTRAMUSCULAR; INTRAVENOUS EVERY 5 MIN PRN
Status: COMPLETED | OUTPATIENT
Start: 2020-09-09 | End: 2020-09-09

## 2020-09-09 RX ORDER — PROMETHAZINE HYDROCHLORIDE 25 MG/ML
6.25 INJECTION, SOLUTION INTRAMUSCULAR; INTRAVENOUS
Status: DISCONTINUED | OUTPATIENT
Start: 2020-09-09 | End: 2020-09-09 | Stop reason: HOSPADM

## 2020-09-09 RX ORDER — LABETALOL HYDROCHLORIDE 5 MG/ML
5 INJECTION, SOLUTION INTRAVENOUS EVERY 10 MIN PRN
Status: DISCONTINUED | OUTPATIENT
Start: 2020-09-09 | End: 2020-09-09 | Stop reason: HOSPADM

## 2020-09-09 RX ORDER — OXYCODONE HYDROCHLORIDE AND ACETAMINOPHEN 5; 325 MG/1; MG/1
1 TABLET ORAL EVERY 6 HOURS PRN
Qty: 20 TABLET | Refills: 0 | Status: SHIPPED | OUTPATIENT
Start: 2020-09-09 | End: 2020-09-16

## 2020-09-09 RX ORDER — BUPIVACAINE HYDROCHLORIDE 5 MG/ML
INJECTION, SOLUTION PERINEURAL
Status: COMPLETED | OUTPATIENT
Start: 2020-09-09 | End: 2020-09-09

## 2020-09-09 RX ADMIN — FENTANYL CITRATE 25 MCG: 50 INJECTION, SOLUTION INTRAMUSCULAR; INTRAVENOUS at 14:45

## 2020-09-09 RX ADMIN — FENTANYL CITRATE 25 MCG: 50 INJECTION INTRAMUSCULAR; INTRAVENOUS at 13:11

## 2020-09-09 RX ADMIN — SODIUM CHLORIDE, POTASSIUM CHLORIDE, SODIUM LACTATE AND CALCIUM CHLORIDE: 600; 310; 30; 20 INJECTION, SOLUTION INTRAVENOUS at 12:44

## 2020-09-09 RX ADMIN — FENTANYL CITRATE 25 MCG: 50 INJECTION INTRAMUSCULAR; INTRAVENOUS at 13:40

## 2020-09-09 RX ADMIN — FENTANYL CITRATE 100 MCG: 50 INJECTION INTRAMUSCULAR; INTRAVENOUS at 12:54

## 2020-09-09 RX ADMIN — FENTANYL CITRATE 25 MCG: 50 INJECTION INTRAMUSCULAR; INTRAVENOUS at 13:13

## 2020-09-09 RX ADMIN — FENTANYL CITRATE 25 MCG: 50 INJECTION, SOLUTION INTRAMUSCULAR; INTRAVENOUS at 15:01

## 2020-09-09 RX ADMIN — LIDOCAINE HYDROCHLORIDE 100 MG: 20 INJECTION, SOLUTION INTRAVENOUS at 12:56

## 2020-09-09 RX ADMIN — FENTANYL CITRATE 25 MCG: 50 INJECTION INTRAMUSCULAR; INTRAVENOUS at 13:26

## 2020-09-09 RX ADMIN — DEXAMETHASONE SODIUM PHOSPHATE 4 MG: 4 INJECTION, SOLUTION INTRAMUSCULAR; INTRAVENOUS at 13:04

## 2020-09-09 RX ADMIN — KETOROLAC TROMETHAMINE 30 MG: 30 INJECTION, SOLUTION INTRAMUSCULAR; INTRAVENOUS at 14:00

## 2020-09-09 RX ADMIN — PROPOFOL 200 MG: 10 INJECTION, EMULSION INTRAVENOUS at 12:56

## 2020-09-09 RX ADMIN — CLINDAMYCIN PHOSPHATE 900 MG: 150 INJECTION, SOLUTION INTRAVENOUS at 13:04

## 2020-09-09 RX ADMIN — FENTANYL CITRATE 25 MCG: 50 INJECTION, SOLUTION INTRAMUSCULAR; INTRAVENOUS at 15:07

## 2020-09-09 RX ADMIN — FENTANYL CITRATE 25 MCG: 50 INJECTION, SOLUTION INTRAMUSCULAR; INTRAVENOUS at 14:54

## 2020-09-09 RX ADMIN — ONDANSETRON 4 MG: 2 INJECTION INTRAMUSCULAR; INTRAVENOUS at 13:59

## 2020-09-09 ASSESSMENT — PAIN SCALES - GENERAL
PAINLEVEL_OUTOF10: 2
PAINLEVEL_OUTOF10: 5
PAINLEVEL_OUTOF10: 2
PAINLEVEL_OUTOF10: 3
PAINLEVEL_OUTOF10: 6
PAINLEVEL_OUTOF10: 6
PAINLEVEL_OUTOF10: 5

## 2020-09-09 ASSESSMENT — PULMONARY FUNCTION TESTS
PIF_VALUE: 13
PIF_VALUE: 6
PIF_VALUE: 12
PIF_VALUE: 14
PIF_VALUE: 10
PIF_VALUE: 4
PIF_VALUE: 15
PIF_VALUE: 14
PIF_VALUE: 0
PIF_VALUE: 14
PIF_VALUE: 1
PIF_VALUE: 14
PIF_VALUE: 1
PIF_VALUE: 14
PIF_VALUE: 14
PIF_VALUE: 6
PIF_VALUE: 14
PIF_VALUE: 14
PIF_VALUE: 13
PIF_VALUE: 6
PIF_VALUE: 0
PIF_VALUE: 12
PIF_VALUE: 14
PIF_VALUE: 15
PIF_VALUE: 14
PIF_VALUE: 12
PIF_VALUE: 14
PIF_VALUE: 12
PIF_VALUE: 14
PIF_VALUE: 4
PIF_VALUE: 14
PIF_VALUE: 15
PIF_VALUE: 14
PIF_VALUE: 14
PIF_VALUE: 15
PIF_VALUE: 14
PIF_VALUE: 1
PIF_VALUE: 14
PIF_VALUE: 0
PIF_VALUE: 14
PIF_VALUE: 0
PIF_VALUE: 14
PIF_VALUE: 3
PIF_VALUE: 14
PIF_VALUE: 15
PIF_VALUE: 15
PIF_VALUE: 0
PIF_VALUE: 14
PIF_VALUE: 21
PIF_VALUE: 14
PIF_VALUE: 0
PIF_VALUE: 14
PIF_VALUE: 14

## 2020-09-09 ASSESSMENT — PAIN DESCRIPTION - LOCATION
LOCATION: ELBOW
LOCATION: ELBOW
LOCATION: WRIST;ELBOW
LOCATION: ELBOW
LOCATION: ELBOW

## 2020-09-09 ASSESSMENT — PAIN DESCRIPTION - PROGRESSION
CLINICAL_PROGRESSION: NOT CHANGED
CLINICAL_PROGRESSION: GRADUALLY IMPROVING
CLINICAL_PROGRESSION: GRADUALLY IMPROVING

## 2020-09-09 ASSESSMENT — PAIN - FUNCTIONAL ASSESSMENT: PAIN_FUNCTIONAL_ASSESSMENT: 0-10

## 2020-09-09 ASSESSMENT — PAIN DESCRIPTION - ORIENTATION
ORIENTATION: RIGHT

## 2020-09-09 ASSESSMENT — PAIN DESCRIPTION - PAIN TYPE
TYPE: SURGICAL PAIN

## 2020-09-09 ASSESSMENT — PAIN DESCRIPTION - FREQUENCY
FREQUENCY: CONTINUOUS
FREQUENCY: INTERMITTENT
FREQUENCY: CONTINUOUS
FREQUENCY: CONTINUOUS

## 2020-09-09 ASSESSMENT — PAIN DESCRIPTION - DESCRIPTORS
DESCRIPTORS: ACHING
DESCRIPTORS: ACHING;BURNING

## 2020-09-09 NOTE — ANESTHESIA PRE PROCEDURE
mouth daily 20   Ignacia Cade, APRN - CNP       Current medications:    No current facility-administered medications for this visit. No current outpatient medications on file. Facility-Administered Medications Ordered in Other Visits   Medication Dose Route Frequency Provider Last Rate Last Dose    clindamycin (CLEOCIN) 900 mg in dextrose 5 % 50 mL IVPB  900 mg Intravenous Once Nreeida Curry MD           Allergies: Allergies   Allergen Reactions    Pcn [Penicillins] Hives     Any derivative       Problem List:    Patient Active Problem List   Diagnosis Code    Pre-diabetes R73.03    Essential hypertension I10    Anxiety F41.9    Rash R21       Past Medical History:        Diagnosis Date    Diabetes mellitus ([facility] [address]     Pre-diabetic - diet controlled - follows with PCP    Elevated WBC count     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)        Past Surgical History:        Procedure Laterality Date     SECTION      HYSTERECTOMY, VAGINAL  2015    bilateral ovaries not removed   [address] N/A 2019    APPENDECTOMY LAPAROSCOPIC performed by Christen Suarez MD at [address] History:    Social History     Tobacco Use    Smoking status: Former Smoker     Packs/day: 1.00     Years: 20.00     Pack years: 20.00     Types: Cigarettes     Last attempt to quit:      Years since quittin.6    Smokeless tobacco: Never Used   Substance Use Topics    Alcohol use: No                                Counseling given: Not Answered      Vital Signs (Current): There were no vitals filed for this visit.                                            BP Readings from Last 3 Encounters:   20 (!) 142/75   20 (!) 153/83   20 138/85       NPO Status: BMI:   Wt Readings from Last 3 Encounters:   09/04/20 258 lb (117 kg)   08/25/20 254 lb (115.2 kg)   08/11/20 254 lb 9.6 oz (115.5 kg)     There is no height or weight on file to calculate BMI.    CBC:   Lab Results   Component Value Date    WBC 12.8 08/11/2020    RBC 4.97 08/11/2020    HGB 14.6 08/11/2020    HCT 42.7 08/11/2020    MCV 85.9 08/11/2020    RDW 13.2 08/11/2020     08/11/2020       CMP:   Lab Results   Component Value Date     07/29/2020    K 3.5 07/29/2020    CL 97 07/29/2020    CO2 28 07/29/2020    BUN 26 07/29/2020    CREATININE 0.7 07/29/2020    GFRAA >60 07/29/2020    AGRATIO 1.5 07/29/2020    LABGLOM >60 07/29/2020    GLUCOSE 101 07/29/2020    PROT 7.1 07/29/2020    CALCIUM 9.6 07/29/2020    BILITOT <0.2 07/29/2020    ALKPHOS 130 07/29/2020    AST 23 07/29/2020    ALT 20 07/29/2020         COVID-19 Screening (If Applicable):   Lab Results   Component Value Date    COVID19 NOT DETECTED 09/04/2020         Anesthesia Evaluation  Patient summary reviewed and Nursing notes reviewed  Airway: Mallampati: II  TM distance: >3 FB   Neck ROM: full  Mouth opening: > = 3 FB Dental:          Pulmonary:Negative Pulmonary ROS and normal exam                              ROS comment: Former smoker, quit 2007      PAT Airway. Limited exam. COVID 19 precautions. Patient wearing mask  Head/Neck movement: full  History of difficult intubation:  None  Teeth: missing upper and lower molars, reports loose molar lower right   Able to lie flat         COVID 19 screening  Denies recent fever or known COVID 19 exposure. Instructed to quarantine until surgery and report any new respiratory or fever symptoms to surgeon. Aware COVID testing must be completed before DOS.  COVID swab:  9/4/2020   Cardiovascular:  Exercise tolerance: good (>4 METS),   (+) hypertension:,         Rhythm: regular  Rate: normal                 ROS comment:   CP or SOB: NO Exercise: NO, has physical job     Neuro/Psych:   (+) neuromuscular disease (right CTS, ulnar tunnel, and ganglion cyst s/p andrew, 1997):, headaches (last 8/2020): migraine headaches, depression/anxiety             GI/Hepatic/Renal:   (+) morbid obesity (BMI: 42)         ROS comment: S/p appy 5/2019. Endo/Other:    (+) Diabetes (diet controlled A1c 6.2, 6/2020)Type II DM, , hyperthyroidism (Graves dz)::., .          Pt had PAT visit. ROS comment: S/p hysterectomy Abdominal:   (+) obese,         Vascular:                                          Anesthesia Plan      general     ASA 2       Induction: intravenous. Anesthetic plan and risks discussed with patient. Plan discussed with CRNA. SCOOTER Bowser - CRNA Chart reviewed, pt. Interviewed and examined. Anesthesia Evaluation will follow by a certified practitioner prior to surgery.

## 2020-09-09 NOTE — PROGRESS NOTES
1545:  Pt received from PACU alert and oriented with minimal c/o discomfort to right elbow. ACE wrap drsg in place to mid upper and entire lower arm. No bleeding or drainage noted. Pt able to move fingers on right hand to command. Call light with in reach, Tolerating PO.

## 2020-09-09 NOTE — BRIEF OP NOTE
Brief Postoperative Note      Patient: Amando Keller  YOB: 1970  MRN: [id number]    Date of Procedure: 9/9/2020    Pre-Op Diagnosis: right cubital tunnel release , right carpal tunnel release , right volar wrist ganglion cyst    Post-Op Diagnosis: Same       Procedure:  1. Right ulnar nerve decompression at the elbow    2. Right carpal tunnel release    3.   Right volar wrist ganglion cyst excision    Surgeon(s):  Andres Mercedes MD    Assistant:  * No surgical staff found *    Anesthesia: General    Estimated Blood Loss (mL): Minimal    Complications: None    Specimens:   * No specimens in log *    Implants:  * No implants in log *      Drains: * No LDAs found *    Findings:     Electronically signed by Andres Mercedes MD on 9/9/2020 at 2:23 PM

## 2020-09-09 NOTE — H&P
Chief Complaint   Patient presents with    Carpal Tunnel       bilateral carpal tunnel R>L    Elbow Pain       Right          History of Present Illness:                             Terrence Gates is a 44 y.o. female who presents today for evaluation of her bilateral right worse than left hand pain numbness and tingling with painful cysts at bilateral wrists volarly.     Her right side is more severe than the left with the numbness and tingling and weakness issues. Her symptoms have been present for years but have been getting progressively severe and worsening for the last 6 months. She does not recall any injuries but does have increased symptoms with repetitive use such as gripping and lifting activities especially while working. The right side has numbness specifically most severe at the middle and ring fingers and is present constantly. Her symptoms get worse with prolonged gripping activities. She also complains of numbness and tingling into her left hand along the middle finger. She has been having worsening symptoms in her volar aspect of the wrist overlying prominent areas of cystic formation on the left wrist worse than the right. She is tried treating these with wrist braces which seem to help initially but her symptoms return when she has to be active at work.     Patient here for a new patient consult SCOOTER Pandey CNP for evaluation of bilateral carpal tunnel syndrome. She states right is worse than left. She is right hand dominant and states onset was about 6 months ago with NKI and no direct cause other than possible repetitive use. She is having numbness in her right hand ring and middle fingertips into her wrist with pain into her elbow. With Mild symptoms on her left wrist/hand. She also has painful cysts on bilateral volar aspect of wrists. She states they have been intermittent for years and have come to the point where they are interfering with ADL's.  She is a  at [employer]. She reports no consecutive treatment and no surgeries on either extremity.  See EMG results below.            EMG IMPRESSION 8/7/2020:                  1.  Abnormal EMG. Regla Kinney is a moderately severe and acute median neuropathy at the right wrist (moderately severe acute right carpal tunnel syndrome).                  2. Edi Dingtrevl left carpal tunnel syndrome.                 3.  Minor ulnar nerve motor neuropathy at the right elbow.                 4.  No evidence of a concurrent spinal nerve root lesion (radiculopathy), plexopathy, generalized neuropathy or primary muscle disease.        Medical History  Patient's medications, allergies, past medical, surgical, social and family histories were reviewed and updated as appropriate.     Past Medical History        Past Medical History:   Diagnosis Date    Diabetes mellitus ([facility] [address]      Graves disease      Hypertension      Hyperthyroidism      Migraine      Type 2 diabetes mellitus without complication ([facility] [address]          Family History         Family History   Problem Relation Age of Onset    Other Mother           thyroid issues    High Blood Pressure Mother      High Cholesterol Mother      Heart Disease Father      Coronary Art Dis Father      High Cholesterol Father      Other Father           Osteoarthritis    Other Maternal Grandmother           brain shunt; hydrocephalus    Heart Attack Maternal Grandfather      Alzheimer's Disease Paternal Grandmother      Heart Attack Paternal Grandfather      Stroke Paternal Grandfather      High Cholesterol Sister      Asthma Sister          Social History   Social History            Socioeconomic History    Marital status:        Spouse name: None    Number of children: None    Years of education: None    Highest education level: None   Occupational History    None   Social Needs    Financial resource strain: None    Food insecurity       Worry: None       Inability: None    Transportation needs       Medical: None       Non-medical: None   Tobacco Use    Smoking status: Former Smoker       Packs/day: 1.00       Years: 20.00       Pack years: 20.00       Types: Cigarettes       Last attempt to quit:        Years since quittin.6    Smokeless tobacco: Never Used   Substance and Sexual Activity    Alcohol use: No    Drug use: No    Sexual activity: Yes       Partners: Male   Lifestyle    Physical activity       Days per week: None       Minutes per session: None    Stress: None   Relationships    Social connections       Talks on phone: None       Gets together: None       Attends [employer] service: None       Active member of club or organization: None       Attends meetings of clubs or organizations: None       Relationship status: None    Intimate partner violence       Fear of current or ex partner: None       Emotionally abused: None       Physically abused: None       Forced sexual activity: None   Other Topics Concern    None   Social History Narrative    None        Current Facility-Administered Medications          Current Outpatient Medications   Medication Sig Dispense Refill    ibuprofen (ADVIL;MOTRIN) 200 MG tablet Take 200 mg by mouth every 6 hours as needed for Pain        venlafaxine (EFFEXOR XR) 75 MG extended release capsule TAKE 1 CAPSULE BY MOUTH ONCE DAILY 90 capsule 1    venlafaxine (EFFEXOR XR) 37.5 MG extended release capsule Take 1 capsule by mouth daily With 75 mg capsule 90 capsule 1    hydroCHLOROthiazide (HYDRODIURIL) 50 MG tablet Take 1 tablet by mouth daily 90 tablet 3      No current facility-administered medications for this visit. Allergies   Allergen Reactions    Pcn [Penicillins] Hives       Any derivative        Review of Systems:   Review of Systems   Constitutional: Negative for chills and fever. HENT: Negative for congestion and sneezing. Eyes: Negative for pain and redness.    Respiratory: Negative for chest tightness, shortness of breath and wheezing. Cardiovascular: Negative for chest pain and palpitations. Gastrointestinal: Negative for vomiting. Musculoskeletal: Positive for arthralgias. Skin: Negative for color change and rash. Neurological: Positive for weakness and numbness. Psychiatric/Behavioral: Negative for agitation. The patient is not nervous/anxious.                                                  Examination:  General Exam:  Vitals: Resp 16   Ht 5' 5.5\" (1.664 m)   Wt 254 lb (115.2 kg)   BMI 41.62 kg/m²    Physical Exam  Constitutional:       Appearance: Normal appearance. HENT:      Head: Normocephalic and atraumatic. Eyes:      Extraocular Movements: Extraocular movements intact. Pupils: Pupils are equal, round, and reactive to light. Neck:      Musculoskeletal: Normal range of motion. Pulmonary:      Effort: Pulmonary effort is normal.   Musculoskeletal:      Right elbow: She exhibits normal range of motion, no swelling, no effusion and no deformity. Tenderness found. Medial epicondyle tenderness noted. Left elbow: She exhibits normal range of motion, no swelling, no effusion and no deformity. No tenderness found. Right forearm: Normal.      Left forearm: Normal.      Comments: Right Upper Extremity:    There is mild tenderness to palpation of the volar aspect of the wrist at the level of the carpal tunnel. There is decreased sensation to light touch in the median nerve distribution. Sensation is intact to light touch along the ulnar and radial nerves. Positive carpal compression test and Phalen's test.  There is mild relative weakness with 4+ strength out of 5 during  test, pinch test, and flexor pollicis brevis. Range of motion of the wrist and fingers is intact without limitation. Skin is intact without wounds or rash. 2+ radial pulse with brisk cap refill throughout the fingers. No atrophy of the thenar musculature.  Strength 5/5 in finger and wrist flexion and extension. Positive ulnar stretch test andThere is tenderness to palpation along the course of the ulnar nerve at the posterior aspect of the medial elbow. There is moderate tenderness to palpation along a prominent cystic structure at the volar aspect of the wrist along the radial aspect adjacent to the radial artery. The cyst measures approximately 10 x 15 mm and is adjacent to the radiocarpal joint    Left Upper Extremity:    There is mild tenderness to palpation of the volar aspect of the wrist at the level of the carpal tunnel. There is decreased sensation to light touch in the median nerve distribution. Sensation is intact to light touch along the ulnar and radial nerves. Positive carpal compression test and Phalen's test.  There is mild relative weakness with 4+ strength out of 5 during  test, pinch test, and flexor pollicis brevis. Range of motion of the wrist and fingers is intact without limitation. Skin is intact without wounds or rash. 2+ radial pulse with brisk cap refill throughout the fingers. No atrophy of the thenar musculature. Strength 5/5 in finger and wrist flexion and extension. There is moderate tenderness to palpation along a prominent cystic structure at the volar aspect of the wrist along the radial aspect adjacent to the radial artery. The cyst measures approximately 10 x 15 mm and is adjacent to the radiocarpal joint         Skin:     General: Skin is warm and dry. Neurological:      General: No focal deficit present. Mental Status: She is alert and oriented to person, place, and time. Psychiatric:         Mood and Affect: Mood normal.               Office Procedures:  No orders of the defined types were placed in this encounter.       Assessment and Plan  1. Right worse than left carpal tunnel syndrome     2. Bilateral symptomatic volar wrist ganglion cysts     3.   Right cubital tunnel syndrome     The exam and history are consistent with carpal syndrome and cubital tunnel syndrome in the right and carpal tunnel syndrome on the left. Additionally she is dealing with symptomatic bilateral volar wrist ganglion cysts. We discussed treatment options for this. Conservative measures have not been successful, including activity modification, rest, over-the-counter anti-inflammatory medications, and night bracing. Due to the severity of symptoms I have recommended surgical intervention. This will consist of a carpal tunnel release, ulnar nerve decompression at the elbow, and surgical excision of the volar wrist ganglion cyst.     We discussed the risks, benefits, and alternatives to surgery as well as the postoperative course. Risks include persistent pain, numbness, weakness, infection, stiffness, recurrence, and incomplete recovery of the nerve. The patient understands and would like to proceed with surgery.     We also discussed her left upper extremity. She would like to stage surgery and have the left carpal tunnel release and left ganglion cyst excised 2 weeks following surgery on her right side.     The patient was counseled at length about the risks of kathia Covid-19 during their perioperative period and any recovery window from their procedure.  The patient was made aware that kathia Covid-19  may worsen their prognosis for recovering from their procedure  and lend to a higher morbidity and/or mortality risk.  All material risks, benefits, and reasonable alternatives including postponing the procedure were discussed. The patient does wish to proceed with the procedure at this time. History and Physical exam note from the office visit is copied above from epic. I have reviewed the note and examined the patient and find no relevant changes.      I have reviewed with the patient and/or family the risks, benefits, and alternatives to the procedure as well as expected postoperative course    Bernadette Faulkner MD

## 2020-09-10 NOTE — OP NOTE
[address] [place] [address], [address] [place] [address]                                OPERATIVE REPORT    PATIENT NAME: Lee Ann Cortes                  :        1970  MED REC NO:   [id number]                          ROOM:  ACCOUNT NO:   [de-identified]                           ADMIT DATE: 2020  PROVIDER:     Shirley Vicente MD    DATE OF PROCEDURE:  2020    PREOPERATIVE DIAGNOSES:  1. Right cubital tunnel syndrome. 2.  Right carpal tunnel syndrome. 3.  Right symptomatic volar wrist ganglion cyst.    POSTOPERATIVE DIAGNOSES:  1. Right cubital tunnel syndrome. 2.  Right carpal tunnel syndrome. 3.  Right symptomatic volar wrist ganglion cyst.    PROCEDURE:  1. Right ulnar nerve decompression at the elbow. 2.  Right carpal tunnel release. 3.  Right volar wrist ganglion cyst excision. SURGEON:  Shirley Vicente MD    ASSISTANT:  Kim Lezama PA-C who assisted with patient positioning,  holding retractors, closure of the wound, and application of the splint  and dressings. ANESTHESIA:  General.    ESTIMATED BLOOD LOSS:  Minimal.    COMPLICATIONS:  None. DISPOSITION:  To PACU in stable condition. PREOPERATIVE INDICATIONS:  The patient is a 64-year-old who has had  ongoing pain and numbness and tingling into her hand and EMG revealed  carpal tunnel syndrome and ulnar nerve compression at the elbow, and  additionally she has dealt with swelling and pain along the volar aspect  of her wrist with a fluctuating mass consistent with a ganglion cyst.   We had a long discussion regarding treatment options and given the  multiple worsening issues in her upper extremity, she wished to proceed  with surgical intervention. I discussed the risks, benefits, and  alternatives to surgery as well as the postoperative course. She  understood this and wished to proceed and consent was obtained.     DESCRIPTION OF PROCEDURE:  The patient was identified in the  preoperative area and the multiple sites were marked. She was taken  back to the operating room, placed supine on the table. After induction  of general endotracheal anesthesia, the right upper extremity was  prepped and draped in a sterile fashion with an arm tourniquet. A  time-out was performed. Preoperative antibiotics were given. An incision was made over the palmar surface of the hand overlying the  carpal tunnel and dissection was carried down through the subcutaneous  tissues and bleeding was controlled with a bipolar. The palmar fascia  was divided and retracted and then the transverse carpal ligament was  identified and incised along its mid aspect under direct visualization  with a 15-blade. The release was carried down distally to the level of  the palmar fat and proximally into the distal forearm fascia. The two  ends of the transverse carpal ligament retracted nicely revealing the  median nerve which was healthy appearing and it was protected throughout  the case. There was good decompression of the nerve following the  release of the carpal tunnel. The wound was then irrigated and the skin  incision was closed with interrupted 4-0 nylon sutures. The surgical  site was injected with 0.5% plain Marcaine for postoperative pain  relief. Attention was then taken to the palpable volar wrist ganglion cyst.  An  incision was made directly overlying the cyst measuring approximately 3  cm in length and dissection was carried down to the subcutaneous tissues  and retractors were placed to protect the radial artery and accompanying  veins. These were mobilized and retracted and protected throughout the  case. A prominent ganglion mass was identified directly adjacent to the  wrist joint and a volar wrist joint capsule. The cyst was mobilized and  during this some clear gelatinous fluid was expressed from the cystic  structure.   This was fully expressed, then the cystic wall was clamped  with an Allis clamp and then freed from its capsular attachment with  dissecting scissors and the cyst was removed and passed off to the back  table. The wound was then irrigated and deep subcutaneous suture was  placed with 3-0 Monocryl and then skin was closed with interrupted 4-0  nylon and the surgical site was injected with 0.5% plain Marcaine for  postoperative pain relief. Attention was then taken to the medial aspect of the elbow and a  curvilinear incision was made overlying the course of the ulnar nerve in  the mid aspect between the medial epicondyle and olecranon. Dissection  was carried down through subcutaneous tissues and bleeding was  controlled with bipolar. A large cutaneous superficial nerve branch was  identified at the mid aspect of the incision and mobilized and retracted  and protected throughout the case. The cubital tunnel was incised with a 15-blade and release was carried  out proximally into the arm and then carried out distally into the  forearm with the forearm fascia and the two heads of the flexor carpi  ulnaris were mobilized and retracted decompressing the ulnar nerve as it  coursed distally into the forearm. The ulnar nerve appeared healthy and  well decompressed through the course through the cubital tunnel. The  elbow was taken through a full range of motion and there was excellent  stability in its alignment behind the medial epicondyle with no further  areas of impingement, and there was no subluxation during range of  motion. The wound was thoroughly irrigated and deep subcutaneous layers  were closed with buried 2-0 Vicryl and skin was closed with a 3-0  Monocryl Stratafix suture and skin was closed with Steri-Strips. A  sterile dressing was applied with Xeroform and 4x8s at the multiple  incisions along with a sterile Webril and a well-padded long arm splint. She was placed into a sling and extubated and taken to PACU in stable  condition.   All

## 2020-09-16 NOTE — PROGRESS NOTES
Patient Name:  Camille Pert  Patient :  1970  Patient MRN:  [id number]     Primary Oncologist: Miracle Chaudhari MD  Referring Provider: SCOOTER Muller CNP     Date of Service: 2020     Chief Complaint:    Chief Complaint   Patient presents with    Follow-up      She came in for follow up visit. Patient Active Problem List:     Pre-diabetes     Essential hypertension     Anxiety     Rash     HPI:   Libra Gilliam is a pleasant 49-year-old  female patient who was referred for evaluation of leukocytosis. I reviewed her blood test record. CBC in 2019 was unremarkable with WBC 9.6, hemoglobin 14.5, platelet 299. In May 2019 WBC was 13.6, hemoglobin 14.9, platelet 993. In 2020 WBC was 11.3, hemoglobin 14.1, platelet 849. In 2019 WBC was 12.7, hemoglobin 14, platelet 317. She has 2 cats and 2 dogs. She has mammogram every 2 years. Mammogram in 2019 was benign. She has Cologuard. On 2020 she came in for follow up visit. She has cats at home and was to have allergies to cats. ESR, flow cytometry study, RT-PCR for BCR abl from peripheral blood were unremarkable. She had recent ulnar nerve release, left carpal tunnel and ganglion removal.  Surgical wound is healing. She has bruises from IV line. No nausea, vomiting or diarrhea. No fever or chills. No headache or dizziness. No chest pain or shortness of breath. 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 She will have flu shot also. Past Medical History: Allergies, depression/anxiety, diabetes, hypertension, obesity, headache, history of sinusitis. Past Surgery History:    Appendectomy due to appendicitis in ,  , hysterectomy , laminectomy , tonsillectomy in  or . Social History:   She smoked 1 pack/day for 20 years and quit in . She his usage in the early  denies any alcohol or illicit drug use.   She has 1 daughter. Family History:   Maternal grandmother had possible colon cancer in her 79. Paternal and maternal aunt had breast cancer. Allergies:  Reviewed as per chart. Medication :  Reviewed as per chart. Review of Systems: The remainder of ROS is unremarkable. Vital Signs: BP (!) 147/84 (Site: Right Upper Arm, Position: Sitting, Cuff Size: Large Adult)   Pulse 73   Temp 97.3 °F (36.3 °C) (Infrared)   Resp 16   Ht 5' 5.5\" (1.664 m)   Wt 255 lb (115.7 kg)   SpO2 97%   BMI 41.79 kg/m²      Physical Exam:  CONSTITUTIONAL: awake, alert, cooperative, no apparent distress   EYES: pupils equal, round and reactive to light, sclera clear and conjunctiva normal  ENT: Normocephalic, without obvious abnormality, atraumatic  NECK: supple, symmetrical, no jugular venous distension and no carotid bruits   HEMATOLOGIC/LYMPHATIC: no cervical, supraclavicular or axillary lymphadenopathy   LUNGS: no increased work of breathing and clear to auscultation   CARDIOVASCULAR: regular rate and rhythm, normal S1 and S2, no murmur noted  ABDOMEN: normal bowel sounds x 4, soft, non-distended, non-tender, no masses palpated, no hepatosplenomegaly   MUSCULOSKELETAL: full range of motion noted, tone is normal  NEUROLOGIC: awake, alert, oriented to name, place and time. Motor skills grossly intact. SKIN: No jaundice. Bruises from recent surgery. Healing scar. EXTREMITIES: no LE edema. No cyanosis.       Labs:  Hematology:  Lab Results   Component Value Date    WBC 12.8 (H) 08/11/2020    RBC 4.97 08/11/2020    HGB 14.6 08/11/2020    HCT 42.7 08/11/2020    MCV 85.9 08/11/2020    MCH 29.4 08/11/2020    MCHC 34.2 08/11/2020    RDW 13.2 08/11/2020     08/11/2020    MPV 9.6 08/11/2020    SEGSPCT 62.7 08/11/2020    EOSRELPCT 4.4 (H) 08/11/2020    BASOPCT 0.3 08/11/2020    LYMPHOPCT 23.4 (L) 08/11/2020    MONOPCT 9.2 (H) 08/11/2020    SEGSABS 8.1 08/11/2020    EOSABS 0.6 08/11/2020    BASOSABS 0.0 08/11/2020 LYMPHSABS 3.0 08/11/2020    MONOSABS 1.2 08/11/2020    DIFFTYPE AUTOMATED DIFFERENTIAL 08/11/2020     Lab Results   Component Value Date    ESR 28 08/11/2020     Chemistry:  Lab Results   Component Value Date     07/29/2020    K 3.5 07/29/2020    CL 97 (L) 07/29/2020    CO2 28 07/29/2020    BUN 26 (H) 07/29/2020    CREATININE 0.7 07/29/2020    GLUCOSE 101 (H) 07/29/2020    CALCIUM 9.6 07/29/2020    PROT 7.1 07/29/2020    LABALBU 4.3 07/29/2020    BILITOT <0.2 07/29/2020    ALKPHOS 130 (H) 07/29/2020    AST 23 07/29/2020    ALT 20 07/29/2020    LABGLOM >60 07/29/2020    GFRAA >60 07/29/2020    AGRATIO 1.5 07/29/2020    GLOB 2.8 07/29/2020    POCGLU 131 (H) 05/31/2019     Lab Results   Component Value Date    TSHHS 1.190 04/18/2016    T4FREE 1.26 04/18/2016     Immunology:  Lab Results   Component Value Date    PROT 7.1 07/29/2020      Observations:  PHQ-9 Total Score: 0 (9/17/2020  2:58 PM)    Assessment & Plan:    1. She has nonspecific mild leukocytosis which could be constitutional to her. Flow cytometry, RT-PCR for BCR abl, and ESR were unremarkable. If this study come back negative and WBC continues to rise, I will consider bone marrow study. 2.  I advised to keep age-appropriate cancer screening up-to-date. 3.  We discussed about healthy diet and lifestyle. Return to clinic in  3-4 moths or sooner. All of her questions have been answered for today. I have discussed the above stated plan with the patient and they verbalized understanding and agreed with the plan. Thank you for allowing us to participate in this patient's care.       Electronically signed by Beverly Waggoner MD on 8/11/20 at 8:56 AM EDT

## 2020-09-17 ENCOUNTER — OFFICE VISIT (OUTPATIENT)
Dept: ONCOLOGY | Age: 50
End: 2020-09-17
Payer: COMMERCIAL

## 2020-09-17 ENCOUNTER — HOSPITAL ENCOUNTER (OUTPATIENT)
Dept: INFUSION THERAPY | Age: 50
Discharge: HOME | End: 2020-09-17
Payer: COMMERCIAL

## 2020-09-17 VITALS
SYSTOLIC BLOOD PRESSURE: 147 MMHG | TEMPERATURE: 97.3 F | BODY MASS INDEX: 40.98 KG/M2 | OXYGEN SATURATION: 97 % | RESPIRATION RATE: 16 BRPM | WEIGHT: 255 LBS | DIASTOLIC BLOOD PRESSURE: 84 MMHG | HEART RATE: 73 BPM | HEIGHT: 66 IN

## 2020-09-17 PROCEDURE — 99211 OFF/OP EST MAY X REQ PHY/QHP: CPT

## 2020-09-17 PROCEDURE — 99213 OFFICE O/P EST LOW 20 MIN: CPT | Performed by: INTERNAL MEDICINE

## 2020-09-17 ASSESSMENT — PATIENT HEALTH QUESTIONNAIRE - PHQ9
SUM OF ALL RESPONSES TO PHQ9 QUESTIONS 1 & 2: 0
SUM OF ALL RESPONSES TO PHQ QUESTIONS 1-9: 0
SUM OF ALL RESPONSES TO PHQ QUESTIONS 1-9: 0
1. LITTLE INTEREST OR PLEASURE IN DOING THINGS: 0
2. FEELING DOWN, DEPRESSED OR HOPELESS: 0

## 2020-09-17 NOTE — PROGRESS NOTES
MA Rooming Questions  Patient: Cindy Cintron  MRN: [id number]    Date: 9/17/2020        1. Do you have any new issues?   no         2. Do you need any refills on medications?    no    3. Have you had any imaging done since your last visit?   no    4. Have you been hospitalized or seen in the emergency room since your last visit here?   yes - [facility] for surgery on arm on 09/09    5. Did the patient have a depression screening completed today?  Yes    PHQ-9 Total Score: 0 (9/17/2020  2:58 PM)       PHQ-9 Given to (if applicable):               PHQ-9 Score (if applicable):                     [] Positive     []  Negative              Does question #9 need addressed (if applicable)                     [] Yes    []  No               Bjorn Colon MA

## 2020-09-22 ENCOUNTER — OFFICE VISIT (OUTPATIENT)
Dept: ORTHOPEDIC SURGERY | Age: 50
End: 2020-09-22

## 2020-09-22 VITALS — WEIGHT: 255 LBS | BODY MASS INDEX: 42.49 KG/M2 | RESPIRATION RATE: 15 BRPM | HEIGHT: 65 IN

## 2020-09-22 PROCEDURE — 99024 POSTOP FOLLOW-UP VISIT: CPT | Performed by: ORTHOPAEDIC SURGERY

## 2020-09-22 NOTE — PROGRESS NOTES
Pt returns today for s/p R-CTR ulnar nerve decompression and ganglion volar ganglion cyst removal. Pt states that she is still have some numbness in the 4th digit and into the thumb. Pt states her thumb was not numb before surgery. Pt states she has no pain in her elbow. Pt states that overall she is doing well after surgery. Pt states that her left wrist pain today is an 7/10. Numbness and tingling in the the fingers and into the wrist. Pain on the left wrist will increase with any movement or the left wrist.          EMG IMPRESSION 8/7/2020:                  1.  Abnormal EMG. Virginia Carota is a moderately severe and acute median neuropathy at the right wrist (moderately severe acute right carpal tunnel syndrome).                  2. Fontanelle Sill left carpal tunnel syndrome.                 3.  Minor ulnar nerve motor neuropathy at the right elbow.                 4.  No evidence of a concurrent spinal nerve root lesion (radiculopathy), plexopathy, generalized neuropathy or primary muscle disease.

## 2020-09-22 NOTE — PATIENT INSTRUCTIONS
Continue weight-bearing as tolerated. Continue range of motion exercises as instructed. Ice and elevate as needed. Tylenol or Motrin for pain.   Sutures removed today   Follow up in 2 weeks   Pre-op for left wrist carpal tunnel and cyst removal

## 2020-09-23 NOTE — PROGRESS NOTES
9/22/2020   Chief Complaint   Patient presents with    Post-Op Check     s/p R-CTR, ulnar nerve decompression and ganglion volar cyst excision 9/9/2020    Pre-op Exam     left CTS w/volar ganglion         History of Present Illness:                             Christinia Mortimer is a 44 y.o. female who returns today for follow-up of her right ulnar nerve decompression at the elbow, carpal tunnel release, and volar wrist ganglion cyst excision. We also plan to discuss her ongoing left carpal tunnel symptoms and volar wrist ganglion cyst.    She has been doing well with light activities and mobility of the right upper extremity. She feels that she is healing well and has some areas of improvement in sensation in her hand but some new areas of numbness specifically. Her hand is functioning well and she has good range of motion but does have aching pain swelling and soreness along the healing surgical sites of the carpal tunnel and volar wrist ganglion cyst excision. She has ongoing symptoms of numbness and tingling and pain and isolated to the area of the median nerve distribution of the left hand. She is interested in considering surgery on the left carpal tunnel and volar ganglion cyst when she is sufficiently healed from surgery on her right side. Pt returns today for s/p R-CTR ulnar nerve decompression and ganglion volar ganglion cyst removal. Pt states that she is still have some numbness in the 4th digit and into the thumb. Pt states her thumb was not numb before surgery. Pt states she has no pain in her elbow. Pt states that overall she is doing well after surgery. Pt states that her left wrist pain today is an 7/10. Numbness and tingling in the the fingers and into the wrist. Pain on the left wrist will increase with any movement or the left wrist.              EMG IMPRESSION 8/7/2020:                  1.  Abnormal EMG.  Pipe Hearing is a moderately severe and acute median neuropathy at the right wrist (moderately severe acute right carpal tunnel syndrome).                2. Merle Latricia left carpal tunnel syndrome.                 3.  Minor ulnar nerve motor neuropathy at the right elbow.                 4.  No evidence of a concurrent spinal nerve root lesion (radiculopathy), plexopathy, generalized neuropathy or primary muscle disease.         Medical History  Patient's medications, allergies, past medical, surgical, social and family histories were reviewed and updated as appropriate.     Past Medical History:   Diagnosis Date    Diabetes mellitus ([facility] [address]     Pre-diabetic - diet controlled - follows with PCP    Elevated WBC count     Dr. Carol Bah disease     Hypertension     PCP    Hyperthyroidism     Migraine     Last migraine: 8/2020    Type 2 diabetes mellitus without complication ([facility] [address]      Family History   Problem Relation Age of Onset    Other Mother         thyroid issues    High Blood Pressure Mother     High Cholesterol Mother     Heart Disease Father     Coronary Art Dis Father     High Cholesterol Father     Other Father         Osteoarthritis    Other Maternal Grandmother         brain shunt; hydrocephalus    Heart Attack Maternal Grandfather     Alzheimer's Disease Paternal Grandmother     Heart Attack Paternal Grandfather     Stroke Paternal Grandfather     High Cholesterol Sister     Asthma Sister      Social History     Socioeconomic History    Marital status:      Spouse name: Not on file    Number of children: Not on file    Years of education: Not on file    Highest education level: Not on file   Occupational History    Not on file   Social Needs    Financial resource strain: Not on file    Food insecurity     Worry: Not on file     Inability: Not on file    Transportation needs     Medical: Not on file     Non-medical: Not on file   Tobacco Use    Smoking status: Former Smoker     Packs/day: 1.00     Years: 20.00     Pack years: 20.00     Types: capsule by mouth daily With 75 mg capsule (Patient taking differently: Take 37.5 mg by mouth nightly With 75 mg capsule) 90 capsule 1    hydroCHLOROthiazide (HYDRODIURIL) 50 MG tablet Take 1 tablet by mouth daily 90 tablet 3     No current facility-administered medications for this visit. Allergies   Allergen Reactions    Pcn [Penicillins] Hives     Any derivative       Review of Systems:   Review of Systems                                            Examination:  General Exam:  Vitals: Resp 15   Ht 5' 5\" (1.651 m)   Wt 255 lb (115.7 kg)   BMI 42.43 kg/m²    Physical Exam  Constitutional:       Appearance: Normal appearance. HENT:      Head: Normocephalic and atraumatic. Eyes:      Extraocular Movements: Extraocular movements intact. Pupils: Pupils are equal, round, and reactive to light. Neck:      Musculoskeletal: Normal range of motion. Pulmonary:      Effort: Pulmonary effort is normal.   Musculoskeletal:      Right elbow: She exhibits normal range of motion, no swelling, no effusion and no deformity. No tenderness found. Left elbow: She exhibits normal range of motion, no swelling, no effusion and no deformity. No tenderness found. Right forearm: Normal.      Left forearm: Normal.      Comments: Left Upper Extremity:    There is mild tenderness to palpation of the volar aspect of the wrist at the level of the carpal tunnel. There is decreased sensation to light touch in the median nerve distribution. Sensation is intact to light touch along the ulnar and radial nerves. Positive carpal compression test and Phalen's test.  There is mild relative weakness with 4+ strength out of 5 during  test, pinch test, and flexor pollicis brevis. Range of motion of the wrist and fingers is intact without limitation. Skin is intact without wounds or rash. 2+ radial pulse with brisk cap refill throughout the fingers. No atrophy of the thenar musculature.  Strength 5/5 in finger and wrist flexion and extension. There is moderate tenderness to palpation over a prominent volar wrist ganglion cyst adjacent to the radial artery and wrist joint capsule. Right upper extremity:  Well-healed surgical scars over the medial elbow, carpal tunnel, and radial volar wrist.  Sutures were removed. No erythema or drainage or induration. Mild irritation of the skin edges related to the ganglion cyst surgical site. There is relative decreased sensation to light touch along the tip of the thumb both radial and ulnar. There is improved sensation along the dorsal forearm and hand as well as the ulnar border of the hand and wrist.  Brisk cap refill. 2+ radial pulse. Good active range of motion of the fingers and wrist with flexion and extension. Full painless active range of motion of the elbow with flexion and extension. Skin:     General: Skin is warm and dry. Neurological:      General: No focal deficit present. Mental Status: She is alert and oriented to person, place, and time. Psychiatric:         Mood and Affect: Mood normal.            Diagnostic testing:  X-rays reviewed in office, I independently reviewed the films in the office today:       Office Procedures:  No orders of the defined types were placed in this encounter. Assessment and Plan  1. Right ulnar nerve decompression    2. Right carpal tunnel release    3. Right volar wrist ganglion cyst excision    4. Left carpal tunnel syndrome    5. Left asymptomatic wrist volar ganglion cyst    We discussed her healing right upper extremity following multiple surgical procedures. I explained that her incisions are healing well with no signs of infection or complications. I recommended progression of her activities as pain allows and continued mobility and stretching exercises.   We discussed the feeling of the surgery and potential for nerve function to improve over time and I recommended we simply observe her symptoms over time at this stage it being early after the surgical procedure. I recommended that she follow-up here in 2 weeks for check of her progress on the right side. If she is sufficiently healed and functioning well and happy with her procedure, then we could consider moving forward with surgical intervention on her left upper extremity. We will tentatively proceed with plan to perform left carpal tunnel release and volar wrist ganglion cyst excision in the near future following the next visit. We discussed the risks and benefits of surgery and again discussed the healing and recovery timetable relative to the nerve compression and carpal tunnel release.         Electronically signed by Darren Chen MD on 9/22/2020 at 9:20 PM

## 2020-10-06 ENCOUNTER — TELEPHONE (OUTPATIENT)
Dept: ORTHOPEDIC SURGERY | Age: 50
End: 2020-10-06

## 2020-10-06 ENCOUNTER — OFFICE VISIT (OUTPATIENT)
Dept: ORTHOPEDIC SURGERY | Age: 50
End: 2020-10-06

## 2020-10-06 ENCOUNTER — HOSPITAL ENCOUNTER (OUTPATIENT)
Age: 50
Setting detail: SPECIMEN
Discharge: HOME | End: 2020-10-06
Payer: COMMERCIAL

## 2020-10-06 VITALS — WEIGHT: 255 LBS | HEIGHT: 65 IN | BODY MASS INDEX: 42.49 KG/M2 | RESPIRATION RATE: 18 BRPM

## 2020-10-06 PROCEDURE — 99024 POSTOP FOLLOW-UP VISIT: CPT | Performed by: ORTHOPAEDIC SURGERY

## 2020-10-06 PROCEDURE — U0002 COVID-19 LAB TEST NON-CDC: HCPCS

## 2020-10-06 NOTE — PROGRESS NOTES
Patient here about 4 weeks postoperatively for her right carpal tunnel release, right ulnar nerve decompression and left volar ganglion cyst excision. She is still having some pins and needle type  sensation in her middle and thumb fingers otherwise she feels some improvement. No other issues.     Date of surgery: 9/9/2020     She is ready to proceed with left CTR and excision of the ganglion cyst on her left volar wrist.    Provider needs to conduct a hands on physical today due to patients injury/diagnosis

## 2020-10-06 NOTE — PATIENT INSTRUCTIONS
Left carpal tunnel release and left volar ganglion cyst excision discussed   Will proceed with surgery   No meds list provided   Consent to be signed day of surgery   Obtain any clearances as needed, as discussed   Screened for COVID in office   Follow up for any pre-op testing/surgery as discussed     Call office with any questions ([facility] surgery scheduler) [phone]

## 2020-10-06 NOTE — PROGRESS NOTES
10/6/2020   Chief Complaint   Patient presents with    Post-Op Check     post op right CTR, UNT, ganglion volar cyst removal DOS 9/9/20    Carpal Tunnel     pre-op carpal tunnel, left and left volar ganglion cyst excison         History of Present Illness:                             Maicol Robb is a 44 y.o. female returns for follow-up of her right carpal tunnel release, ganglion cyst excision, and ulnar nerve decompression. Right hand is doing better but she still has some improving numbness along the tip of the thumb and middle finger. Her wrist is improved and she does not complain of any further pain or swelling in the area of the excised ganglion cyst.  Her hand is functioning well with good range of motion and strength    She continues have pain and swelling along the left wrist at the small volar ganglion cyst.  Additionally she has numbness and tingling and pain along the median nerve distribution    Patient here about 4 weeks postoperatively for her right carpal tunnel release, right ulnar nerve decompression and left volar ganglion cyst excision. She is still having some pins and needle type  sensation in her middle and thumb fingers otherwise she feels some improvement. No other issues.     Date of surgery: 9/9/2020      She is ready to proceed with left CTR and excision of the ganglion cyst on her left volar wrist.    Medical History  Patient's medications, allergies, past medical, surgical, social and family histories were reviewed and updated as appropriate. Review of Systems:   Review of Systems   Constitutional: Negative for chills and fever. HENT: Negative for congestion and sneezing. Eyes: Negative for pain and redness. Respiratory: Negative for chest tightness, shortness of breath and wheezing. Cardiovascular: Negative for chest pain and palpitations. Gastrointestinal: Negative for vomiting. Musculoskeletal: Positive for arthralgias.    Skin: Negative for color change and rash. Neurological: Positive for weakness and numbness. Psychiatric/Behavioral: Negative for agitation. The patient is not nervous/anxious. Examination:  General Exam:  Vitals: Resp 18   Ht 5' 5\" (1.651 m)   Wt 255 lb (115.7 kg)   BMI 42.43 kg/m²    Physical Exam       Left Upper Extremity:    There is mild tenderness to palpation of the volar aspect of the wrist at the level of the carpal tunnel. There is decreased sensation to light touch in the median nerve distribution. Sensation is intact to light touch along the ulnar and radial nerves. Positive carpal compression test and Phalen's test.  There is mild relative weakness with 4+ strength out of 5 during  test, pinch test, and flexor pollicis brevis. Range of motion of the wrist and fingers is intact without limitation. Skin is intact without wounds or rash. 2+ radial pulse with brisk cap refill throughout the fingers. No atrophy of the thenar musculature. Strength 5/5 in finger and wrist flexion and extension. There is moderate tenderness to palpation over a mildly prominent volar wrist ganglion cyst adjacent to the radial artery and wrist joint capsule. Right upper extremity:  Well-healed surgical scars over the medial elbow, carpal tunnel, and radial volar wrist.  Sutures were removed. No erythema or drainage or induration. Mild irritation of the skin edges related to the ganglion cyst surgical site. There is relative decreased sensation to light touch along the tip of the thumb both radial and ulnar. There is improved sensation along the dorsal forearm and hand as well as the ulnar border of the hand and wrist.  Brisk cap refill. 2+ radial pulse. Good active range of motion of the fingers and wrist with flexion and extension. Full painless active range of motion of the elbow with flexion and extension.       Office Procedures:  No orders of the defined types were placed

## 2020-10-07 LAB
SARS-COV-2: NOT DETECTED
SOURCE: NORMAL

## 2020-10-07 RX ORDER — VENLAFAXINE HYDROCHLORIDE 37.5 MG/1
37.5 CAPSULE, EXTENDED RELEASE ORAL NIGHTLY
Qty: 90 CAPSULE | Refills: 1 | Status: SHIPPED
Start: 2020-10-07 | End: 2021-01-27 | Stop reason: DRUGHIGH

## 2020-10-09 ENCOUNTER — ANESTHESIA EVENT (OUTPATIENT)
Dept: OPERATING ROOM | Age: 50
End: 2020-10-09
Payer: COMMERCIAL

## 2020-10-10 ASSESSMENT — ENCOUNTER SYMPTOMS
EYE REDNESS: 0
VOMITING: 0
EYE PAIN: 0
CHEST TIGHTNESS: 0
WHEEZING: 0
COLOR CHANGE: 0
SHORTNESS OF BREATH: 0

## 2020-10-12 ENCOUNTER — HOSPITAL ENCOUNTER (OUTPATIENT)
Age: 50
Setting detail: OUTPATIENT SURGERY
Discharge: HOME | End: 2020-10-12
Attending: ORTHOPAEDIC SURGERY | Admitting: ORTHOPAEDIC SURGERY
Payer: COMMERCIAL

## 2020-10-12 ENCOUNTER — ANESTHESIA (OUTPATIENT)
Dept: OPERATING ROOM | Age: 50
End: 2020-10-12
Payer: COMMERCIAL

## 2020-10-12 VITALS
WEIGHT: 255 LBS | HEART RATE: 91 BPM | BODY MASS INDEX: 42.49 KG/M2 | DIASTOLIC BLOOD PRESSURE: 77 MMHG | HEIGHT: 65 IN | SYSTOLIC BLOOD PRESSURE: 130 MMHG | TEMPERATURE: 97 F | OXYGEN SATURATION: 93 % | RESPIRATION RATE: 16 BRPM

## 2020-10-12 VITALS
OXYGEN SATURATION: 99 % | SYSTOLIC BLOOD PRESSURE: 131 MMHG | DIASTOLIC BLOOD PRESSURE: 79 MMHG | TEMPERATURE: 98.6 F | RESPIRATION RATE: 12 BRPM

## 2020-10-12 LAB — GLUCOSE BLD-MCNC: 132 MG/DL (ref 70–99)

## 2020-10-12 PROCEDURE — 6360000002 HC RX W HCPCS: Performed by: ANESTHESIOLOGY

## 2020-10-12 PROCEDURE — 2500000003 HC RX 250 WO HCPCS: Performed by: ORTHOPAEDIC SURGERY

## 2020-10-12 PROCEDURE — 3600000002 HC SURGERY LEVEL 2 BASE: Performed by: ORTHOPAEDIC SURGERY

## 2020-10-12 PROCEDURE — 82962 GLUCOSE BLOOD TEST: CPT

## 2020-10-12 PROCEDURE — 3700000000 HC ANESTHESIA ATTENDED CARE: Performed by: ORTHOPAEDIC SURGERY

## 2020-10-12 PROCEDURE — 7100000011 HC PHASE II RECOVERY - ADDTL 15 MIN: Performed by: ORTHOPAEDIC SURGERY

## 2020-10-12 PROCEDURE — 3600000012 HC SURGERY LEVEL 2 ADDTL 15MIN: Performed by: ORTHOPAEDIC SURGERY

## 2020-10-12 PROCEDURE — 7100000000 HC PACU RECOVERY - FIRST 15 MIN: Performed by: ORTHOPAEDIC SURGERY

## 2020-10-12 PROCEDURE — 25111 REMOVE WRIST TENDON LESION: CPT | Performed by: ORTHOPAEDIC SURGERY

## 2020-10-12 PROCEDURE — 7100000001 HC PACU RECOVERY - ADDTL 15 MIN: Performed by: ORTHOPAEDIC SURGERY

## 2020-10-12 PROCEDURE — 6360000002 HC RX W HCPCS: Performed by: NURSE ANESTHETIST, CERTIFIED REGISTERED

## 2020-10-12 PROCEDURE — 2580000003 HC RX 258: Performed by: ANESTHESIOLOGY

## 2020-10-12 PROCEDURE — 2709999900 HC NON-CHARGEABLE SUPPLY: Performed by: ORTHOPAEDIC SURGERY

## 2020-10-12 PROCEDURE — 7100000010 HC PHASE II RECOVERY - FIRST 15 MIN: Performed by: ORTHOPAEDIC SURGERY

## 2020-10-12 PROCEDURE — 3700000001 HC ADD 15 MINUTES (ANESTHESIA): Performed by: ORTHOPAEDIC SURGERY

## 2020-10-12 PROCEDURE — 2580000003 HC RX 258: Performed by: ORTHOPAEDIC SURGERY

## 2020-10-12 PROCEDURE — 64721 CARPAL TUNNEL SURGERY: CPT | Performed by: ORTHOPAEDIC SURGERY

## 2020-10-12 RX ORDER — MORPHINE SULFATE 2 MG/ML
2 INJECTION, SOLUTION INTRAMUSCULAR; INTRAVENOUS EVERY 5 MIN PRN
Status: DISCONTINUED | OUTPATIENT
Start: 2020-10-12 | End: 2020-10-12 | Stop reason: HOSPADM

## 2020-10-12 RX ORDER — DEXAMETHASONE SODIUM PHOSPHATE 4 MG/ML
INJECTION, SOLUTION INTRA-ARTICULAR; INTRALESIONAL; INTRAMUSCULAR; INTRAVENOUS; SOFT TISSUE PRN
Status: DISCONTINUED | OUTPATIENT
Start: 2020-10-12 | End: 2020-10-12 | Stop reason: SDUPTHER

## 2020-10-12 RX ORDER — HYDROMORPHONE HCL 110MG/55ML
0.25 PATIENT CONTROLLED ANALGESIA SYRINGE INTRAVENOUS EVERY 5 MIN PRN
Status: DISCONTINUED | OUTPATIENT
Start: 2020-10-12 | End: 2020-10-12 | Stop reason: HOSPADM

## 2020-10-12 RX ORDER — HYDROMORPHONE HCL 110MG/55ML
0.5 PATIENT CONTROLLED ANALGESIA SYRINGE INTRAVENOUS EVERY 5 MIN PRN
Status: DISCONTINUED | OUTPATIENT
Start: 2020-10-12 | End: 2020-10-12 | Stop reason: HOSPADM

## 2020-10-12 RX ORDER — ONDANSETRON 2 MG/ML
INJECTION INTRAMUSCULAR; INTRAVENOUS PRN
Status: DISCONTINUED | OUTPATIENT
Start: 2020-10-12 | End: 2020-10-12 | Stop reason: SDUPTHER

## 2020-10-12 RX ORDER — FENTANYL CITRATE 50 UG/ML
25 INJECTION, SOLUTION INTRAMUSCULAR; INTRAVENOUS EVERY 5 MIN PRN
Status: DISCONTINUED | OUTPATIENT
Start: 2020-10-12 | End: 2020-10-12 | Stop reason: HOSPADM

## 2020-10-12 RX ORDER — SODIUM CHLORIDE, SODIUM LACTATE, POTASSIUM CHLORIDE, CALCIUM CHLORIDE 600; 310; 30; 20 MG/100ML; MG/100ML; MG/100ML; MG/100ML
INJECTION, SOLUTION INTRAVENOUS CONTINUOUS
Status: DISCONTINUED | OUTPATIENT
Start: 2020-10-12 | End: 2020-10-12 | Stop reason: HOSPADM

## 2020-10-12 RX ORDER — LIDOCAINE HYDROCHLORIDE 20 MG/ML
INJECTION, SOLUTION INTRAVENOUS PRN
Status: DISCONTINUED | OUTPATIENT
Start: 2020-10-12 | End: 2020-10-12 | Stop reason: SDUPTHER

## 2020-10-12 RX ORDER — PROMETHAZINE HYDROCHLORIDE 25 MG/ML
6.25 INJECTION, SOLUTION INTRAMUSCULAR; INTRAVENOUS
Status: DISCONTINUED | OUTPATIENT
Start: 2020-10-12 | End: 2020-10-12 | Stop reason: HOSPADM

## 2020-10-12 RX ORDER — HYDRALAZINE HYDROCHLORIDE 20 MG/ML
5 INJECTION INTRAMUSCULAR; INTRAVENOUS EVERY 10 MIN PRN
Status: DISCONTINUED | OUTPATIENT
Start: 2020-10-12 | End: 2020-10-12 | Stop reason: HOSPADM

## 2020-10-12 RX ORDER — PROPOFOL 10 MG/ML
INJECTION, EMULSION INTRAVENOUS PRN
Status: DISCONTINUED | OUTPATIENT
Start: 2020-10-12 | End: 2020-10-12 | Stop reason: SDUPTHER

## 2020-10-12 RX ORDER — FENTANYL CITRATE 50 UG/ML
INJECTION, SOLUTION INTRAMUSCULAR; INTRAVENOUS PRN
Status: DISCONTINUED | OUTPATIENT
Start: 2020-10-12 | End: 2020-10-12 | Stop reason: SDUPTHER

## 2020-10-12 RX ORDER — LABETALOL HYDROCHLORIDE 5 MG/ML
5 INJECTION, SOLUTION INTRAVENOUS EVERY 10 MIN PRN
Status: DISCONTINUED | OUTPATIENT
Start: 2020-10-12 | End: 2020-10-12 | Stop reason: HOSPADM

## 2020-10-12 RX ORDER — BUPIVACAINE HYDROCHLORIDE 5 MG/ML
INJECTION, SOLUTION EPIDURAL; INTRACAUDAL
Status: COMPLETED | OUTPATIENT
Start: 2020-10-12 | End: 2020-10-12

## 2020-10-12 RX ADMIN — HYDROMORPHONE HYDROCHLORIDE 0.25 MG: 2 INJECTION, SOLUTION INTRAMUSCULAR; INTRAVENOUS; SUBCUTANEOUS at 12:49

## 2020-10-12 RX ADMIN — LIDOCAINE HYDROCHLORIDE 100 MG: 20 INJECTION, SOLUTION INTRAVENOUS at 10:57

## 2020-10-12 RX ADMIN — DEXTROSE MONOHYDRATE 900 MG: 50 INJECTION, SOLUTION INTRAVENOUS at 11:06

## 2020-10-12 RX ADMIN — PROPOFOL 200 MG: 10 INJECTION, EMULSION INTRAVENOUS at 10:57

## 2020-10-12 RX ADMIN — HYDROMORPHONE HYDROCHLORIDE 0.5 MG: 2 INJECTION, SOLUTION INTRAMUSCULAR; INTRAVENOUS; SUBCUTANEOUS at 12:39

## 2020-10-12 RX ADMIN — DEXAMETHASONE SODIUM PHOSPHATE 4 MG: 4 INJECTION, SOLUTION INTRAMUSCULAR; INTRAVENOUS at 11:03

## 2020-10-12 RX ADMIN — FENTANYL CITRATE 50 MCG: 50 INJECTION INTRAMUSCULAR; INTRAVENOUS at 11:08

## 2020-10-12 RX ADMIN — FENTANYL CITRATE 100 MCG: 50 INJECTION INTRAMUSCULAR; INTRAVENOUS at 10:55

## 2020-10-12 RX ADMIN — FENTANYL CITRATE 25 MCG: 50 INJECTION INTRAMUSCULAR; INTRAVENOUS at 11:24

## 2020-10-12 RX ADMIN — SODIUM CHLORIDE, POTASSIUM CHLORIDE, SODIUM LACTATE AND CALCIUM CHLORIDE: 600; 310; 30; 20 INJECTION, SOLUTION INTRAVENOUS at 08:28

## 2020-10-12 RX ADMIN — ONDANSETRON 4 MG: 2 INJECTION INTRAMUSCULAR; INTRAVENOUS at 11:15

## 2020-10-12 RX ADMIN — FENTANYL CITRATE 25 MCG: 50 INJECTION INTRAMUSCULAR; INTRAVENOUS at 11:18

## 2020-10-12 ASSESSMENT — PAIN DESCRIPTION - LOCATION
LOCATION: WRIST

## 2020-10-12 ASSESSMENT — PULMONARY FUNCTION TESTS
PIF_VALUE: 0
PIF_VALUE: 17
PIF_VALUE: 16
PIF_VALUE: 14
PIF_VALUE: 17
PIF_VALUE: 0
PIF_VALUE: 1
PIF_VALUE: 24
PIF_VALUE: 14
PIF_VALUE: 14
PIF_VALUE: 16
PIF_VALUE: 17
PIF_VALUE: 20
PIF_VALUE: 16
PIF_VALUE: 16
PIF_VALUE: 17
PIF_VALUE: 19
PIF_VALUE: 16
PIF_VALUE: 17
PIF_VALUE: 16
PIF_VALUE: 16
PIF_VALUE: 1
PIF_VALUE: 16
PIF_VALUE: 14
PIF_VALUE: 16
PIF_VALUE: 0
PIF_VALUE: 16
PIF_VALUE: 14
PIF_VALUE: 16
PIF_VALUE: 0
PIF_VALUE: 16
PIF_VALUE: 16
PIF_VALUE: 14
PIF_VALUE: 17
PIF_VALUE: 17
PIF_VALUE: 14
PIF_VALUE: 17
PIF_VALUE: 0
PIF_VALUE: 16
PIF_VALUE: 17
PIF_VALUE: 16
PIF_VALUE: 0
PIF_VALUE: 14
PIF_VALUE: 17
PIF_VALUE: 14
PIF_VALUE: 22
PIF_VALUE: 14
PIF_VALUE: 14
PIF_VALUE: 16
PIF_VALUE: 17
PIF_VALUE: 16
PIF_VALUE: 16

## 2020-10-12 ASSESSMENT — PAIN DESCRIPTION - DESCRIPTORS
DESCRIPTORS: DULL;ACHING
DESCRIPTORS: DULL;ACHING
DESCRIPTORS: ACHING;DULL
DESCRIPTORS: ACHING;DULL

## 2020-10-12 ASSESSMENT — PAIN - FUNCTIONAL ASSESSMENT: PAIN_FUNCTIONAL_ASSESSMENT: 0-10

## 2020-10-12 ASSESSMENT — PAIN SCALES - GENERAL
PAINLEVEL_OUTOF10: 6
PAINLEVEL_OUTOF10: 7
PAINLEVEL_OUTOF10: 3
PAINLEVEL_OUTOF10: 3

## 2020-10-12 ASSESSMENT — PAIN DESCRIPTION - FREQUENCY
FREQUENCY: CONTINUOUS
FREQUENCY: INTERMITTENT
FREQUENCY: INTERMITTENT
FREQUENCY: CONTINUOUS

## 2020-10-12 ASSESSMENT — PAIN DESCRIPTION - ORIENTATION
ORIENTATION: LEFT

## 2020-10-12 ASSESSMENT — PAIN DESCRIPTION - PAIN TYPE
TYPE: SURGICAL PAIN

## 2020-10-12 ASSESSMENT — PAIN DESCRIPTION - PROGRESSION: CLINICAL_PROGRESSION: GRADUALLY IMPROVING

## 2020-10-12 NOTE — PROGRESS NOTES
Ace wrap to left hand dry and intact. Elevated on pillow. Fingers mobile and warm Circulation checks adequate.

## 2020-10-12 NOTE — ANESTHESIA PRE PROCEDURE
Department of Anesthesiology  Preprocedure Note       Name:  Rob Daniel   Age:  44 y.o.  :  1970                                          MRN:  [id number]         Date:  10/12/2020      Surgeon: Po Brice):  Jesus Moon MD    Procedure: Procedure(s):  LEFT CARPAL TUNNEL RELEASE    Medications prior to admission:   Prior to Admission medications    Medication Sig Start Date End Date Taking?  Authorizing Provider   venlafaxine (EFFEXOR XR) 37.5 MG extended release capsule Take 1 capsule by mouth nightly With 75 mg capsule 10/7/20  Yes SCOOTER Mancia CNP   Lactobacillus (ACIDOPHILUS) 100 MG CAPS Take by mouth daily   Yes Historical Provider, MD   Glucosamine-Chondroitin (GLUCOSAMINE CHONDR COMPLEX PO) Take by mouth daily   Yes Historical Provider, MD   vitamin B-12 (CYANOCOBALAMIN) 500 MCG tablet Take 500 mcg by mouth daily   Yes Historical Provider, MD   docusate sodium (COLACE) 100 MG capsule Take 100 mg by mouth 2 times daily   Yes Historical Provider, MD   Cinnamon (EQL CINNAMON) 500 MG CAPS Take 1,200 mg by mouth daily   Yes Historical Provider, MD   APPLE CIDER VINEGAR PO Take 450 mg by mouth daily   Yes Historical Provider, MD   famotidine (PEPCID) 20 MG tablet Take 20 mg by mouth nightly   Yes Historical Provider, MD   magnesium gluconate (MAGONATE) 500 MG tablet Take 250 mg by mouth nightly   Yes Historical Provider, MD   Potassium 99 MG TABS Take by mouth nightly   Yes Historical Provider, MD   ibuprofen (ADVIL;MOTRIN) 200 MG tablet Take 200 mg by mouth every 6 hours as needed for Pain   Yes Historical Provider, MD   venlafaxine (EFFEXOR XR) 75 MG extended release capsule TAKE 1 CAPSULE BY MOUTH ONCE DAILY  Patient taking differently: Take 75 mg by mouth nightly  20  Yes SCOOTER Mancia CNP   hydroCHLOROthiazide (HYDRODIURIL) 50 MG tablet Take 1 tablet by mouth daily 20  Yes SCOOTER Mancia CNP       Current medications:    Current Facility-Administered Medications Applicable):  No results found for: HIV, HEPCAB    COVID-19 Screening (If Applicable):   Lab Results   Component Value Date    COVID19 NOT DETECTED 10/06/2020         Anesthesia Evaluation    Airway: Mallampati: I  TM distance: >3 FB   Neck ROM: full  Mouth opening: > = 3 FB Dental:          Pulmonary:normal exam                               Cardiovascular:    (+) hypertension: mild,         Rhythm: regular  Rate: normal                    Neuro/Psych:   (+) neuromuscular disease:, headaches: migraine headaches, depression/anxiety             GI/Hepatic/Renal:   (+) GERD: well controlled,           Endo/Other:    (+) DiabetesType II DM, well controlled, , hyperthyroidism::., .                 Abdominal:           Vascular:                                        Anesthesia Plan      MAC     ASA 2       Induction: intravenous. Anesthetic plan and risks discussed with patient. Plan discussed with CRNA.                   Valdez Holt MD   10/12/2020

## 2020-10-12 NOTE — PROGRESS NOTES
1154 - transferred from OR on cart, monitor applied alarms on and verified bedside handoff provided by Vani Tavera and [facility]  [phone] - medicated for complaint of surgical pain/discomfort  1304 - phase one care complete  1311 - transferred to [facility] Emergency room 12

## 2020-10-12 NOTE — BRIEF OP NOTE
Brief Postoperative Note      Patient: Kaelyn Dai  YOB: 1970  MRN: [id number]    Date of Procedure: 10/12/2020    Pre-Op Diagnosis: LEFT CARPAL TUNNEL SYNDROME    Post-Op Diagnosis: Same       Procedure(s):  LEFT CARPAL TUNNEL RELEASE, LEFT GANGLION CYST EXCISION    Surgeon(s):  Becky Anand MD    Assistant:  AMINA Ames    Anesthesia: Monitor Anesthesia Care    Estimated Blood Loss (mL): Minimal    Complications: None    Specimens:   * No specimens in log *    Implants:  * No implants in log *      Drains: * No LDAs found *    Findings:     Electronically signed by Becky Anand MD on 10/12/2020 at 12:02 PM

## 2020-10-12 NOTE — OP NOTE
[address], [address] [place] [address]                                OPERATIVE REPORT    PATIENT NAME: Jayro Berman                  :        1970  MED REC NO:   [id number]                          ROOM:  ACCOUNT NO:   [de-identified]                           ADMIT DATE: 10/12/2020  PROVIDER:     Heri Crabtree MD    DATE OF PROCEDURE:  10/12/2020    PREOPERATIVE DIAGNOSES:  1. Left carpal tunnel syndrome. 2.  Left wrist volar ganglion cyst.    POSTOPERATIVE DIAGNOSES:  1. Left carpal tunnel syndrome. 2.  Left wrist volar ganglion cyst.    PROCEDURES:  1. Left carpal tunnel release. 2.  Left wrist volar ganglion cyst excision. SURGEON:  Heri Crabtree MD    ASSISTANT:  Marysol Fonseca PA-C    ANESTHESIA:  General.    BLOOD LOSS:  Minimal.    COMPLICATIONS:  None. DISPOSITION:  To PACU in stable condition. OPERATIVE FINDINGS:  There was a deep cyst adjacent to the wrist joint  capsule on the radial aspect of the wrist.    PREOPERATIVE INDICATIONS:  The patient is a 61-year-old who has had  ongoing numbness and tingling in the median nerve distribution and pain  and swelling along a ganglion cyst at the volar aspect of the left  wrist.  She has previously responded well to similar surgery on her  right side and given the progressive worsening symptoms she wished to  proceed with surgical intervention on the left side for the above  procedure. We discussed the risks and benefits of surgery as well as  the postoperative course and consent was obtained. OPERATIVE PROCEDURE:  The patient was identified in the preoperative  area and the site was marked. She was taken back to the operating room,  placed supine on the table. After induction of general endotracheal  anesthesia, the left upper extremity was prepped and draped in sterile  fashion with an arm tourniquet.   A time-out was performed and  preoperative antibiotics

## 2020-10-20 ENCOUNTER — OFFICE VISIT (OUTPATIENT)
Dept: ORTHOPEDIC SURGERY | Age: 50
End: 2020-10-20

## 2020-10-20 VITALS
RESPIRATION RATE: 15 BRPM | HEART RATE: 68 BPM | BODY MASS INDEX: 42.49 KG/M2 | HEIGHT: 65 IN | OXYGEN SATURATION: 98 % | WEIGHT: 255 LBS

## 2020-10-20 PROCEDURE — 99024 POSTOP FOLLOW-UP VISIT: CPT | Performed by: ORTHOPAEDIC SURGERY

## 2020-10-20 NOTE — LETTER
[facility]  [address] & [facility] [address]  Phone: [phone]    Bree Celaya MD        October 20, 2020     Patient: Luther Roger   YOB: 1970   Date of Visit: 10/20/2020       To Whom It May Concern: It is my medical opinion that Rajendra Carter may return to work on 11/23/20 with no restrictions . If you have any questions or concerns, please don't hesitate to call.     Sincerely,        Bree Celaya MD

## 2020-10-20 NOTE — PROGRESS NOTES
10/20/2020   Chief Complaint   Patient presents with    Post-Op Check     1 week post op left CTR DOS 10/12/20        History of Present Illness:                             Negin Nunez is a 44 y.o. female returns today for postoperative visit after left carpal tunnel release and ganglion cyst excision. She is noticing improvement in her range of motion but still has pain and sensitivity and mild swelling issues at her surgical sites. Her sensation is improved in the median nerve distribution. We discussed her right side as well. She feels that her hand is improving regularly following surgery and she still has some mild decrease sensation in the thumb and middle fingers. Overall she feels that the swelling is going down she has good function of her hand. She has had some mild increased sensation since the previous visit in the right thumb and middle finger. Pt returns today for 1 week post op left CTR and left volar ganglion cyst excision, post op check right CTR, UNT ganglion volar cyst removal DOS 9/9/20. Pt states on her left hand she is doing well. She has some numbness in the thumb and is very tender to the touch. Right hand still having numbness in the thumb and index finger. Pt states that she has been working on her ROM daily for the right wrist.                                         Examination:  General Exam:  Vitals: Pulse 68   Resp 15   Ht 5' 5\" (1.651 m)   Wt 255 lb (115.7 kg)   SpO2 98%   BMI 42.43 kg/m²    Physical Exam   Operative Extremity:  Left upper extremity:  Well-healed surgical scars over the volar aspect of the radial wrist and along the carpal tunnel. Sutures were removed. No erythema, induration, or drainage. There is improved sensation in the median nerve distribution. Good strength and range of motion of the hand and fingers with some mild residual swelling and stiffness from her recent surgery.     Right upper extremity:  Persistent relative numbness but gradually improving sensation along the thumb and middle fingers of the right hand. Well-healed surgical scars over the carpal tunnel, radial aspect of the volar wrist, and medial elbow. Good strength and range of motion present throughout the hand with  and pinch strength 5 out of 5. Assessment and Plan  1. Left carpal tunnel release and volar wrist ganglion cyst excision    2. Right carpal tunnel release, volar wrist ganglion cyst excision, ulnar nerve decompression at the elbow    She is now recovering well following her left wrist procedures. Sutures were removed. She can advance her activities with gripping and lifting as tolerated. We discussed the recovery process and I have explained that I expect her to continue to make regular improvements on both hands following her multiple procedures. We discussed potential return to work issues. Due to the delay from the first to the second surgeries I anticipate that she will need additional time off of work in addition but we had previously estimated. Today we have discussed return to work issues and I feel that she would be able to likely return to work on 11/23/2020. We will keep her off work until then. I would like her to follow-up here in 3 weeks for check of her progress and we will determine whether she is on track to return to work at that time.     She will continue with stretching and massage and desensitization to the right hand and continue with strengthening as tolerated        Electronically signed by Ernesto Rice MD on 10/20/2020 at 2:37 PM

## 2020-10-20 NOTE — PROGRESS NOTES
Pt returns today for 1 week post op left CTR and left volar ganglion cyst excision, post op check right CTR, UNT ganglion volar cyst removal DOS 9/9/20. Pt states on her left hand she is doing well. She has some numbness in the thumb and is very tender to the touch. Right hand still having numbness in the thumb and index finger.  Pt states that she has been working on her ROM daily for the right wrist.

## 2020-11-10 ENCOUNTER — OFFICE VISIT (OUTPATIENT)
Dept: ORTHOPEDIC SURGERY | Age: 50
End: 2020-11-10

## 2020-11-10 VITALS
HEIGHT: 65 IN | HEART RATE: 73 BPM | BODY MASS INDEX: 42.49 KG/M2 | WEIGHT: 255 LBS | RESPIRATION RATE: 16 BRPM | OXYGEN SATURATION: 97 %

## 2020-11-10 PROCEDURE — 99024 POSTOP FOLLOW-UP VISIT: CPT | Performed by: ORTHOPAEDIC SURGERY

## 2020-11-10 NOTE — PROGRESS NOTES
Patient returns today 4 weeks post operatively for a left carpal tunnel release surgery that was performed on 10/12/2020. Patient states that she continues to work on stretching but continues to have numbness within the middle, ring, and now the right thumb with a dull pain felt within the bilateral wrists. She continues to massage the fingers with no resolve and admits to be frustrated with the overall result mostly of the right wrist carpal tunnel especially since that appears to be her dominant hand and she is expecting to return to work in a few weeks. Pain is rated at a 3/10 which is manageable in the left hand . No new injury reported.

## 2020-11-10 NOTE — PROGRESS NOTES
11/10/2020   Chief Complaint   Patient presents with    Post-Op Check     left carpal tunnel release DOS 10/12/2020        History of Present Illness:                             Long Almeida is a 44 y.o. female returns today for follow-up of her left carpal tunnel release. Her left hand has been improving but she continues to complain of some numbness in her radial 3 digits and also incomplete relief from the numbness in her right hand. She feels that the areas of the ganglion cyst excision are healing well but she is frustrated with her lack of nerve recovery following her carpal tunnel release especially on the right hand. Patient returns today 4 weeks post operatively for a left carpal tunnel release surgery that was performed on 10/12/2020. Patient states that she continues to work on stretching but continues to have numbness within the middle, ring, and now the right thumb with a dull pain felt within the bilateral wrists. She continues to massage the fingers with no resolve and admits to be frustrated with the overall result mostly of the right wrist carpal tunnel especially since that appears to be her dominant hand and she is expecting to return to work in a few weeks. Pain is rated at a 3/10 which is manageable in the left hand . No new injury reported. Examination:  General Exam:  Vitals: Pulse 73   Resp 16   Ht 5' 5\" (1.651 m)   Wt 255 lb (115.7 kg)   SpO2 97%   BMI 42.43 kg/m²    Physical Exam   Left upper extremity:  Well-healed surgical incisions over the carpal tunnel and radial wrist ganglion cyst.  No erythema, drainage, or induration. Improved but persistent mild decrease sensation along the thumb, index finger, and middle finger. Right upper extremity:  Well-healed scars over the carpal tunnel and radial wrist ganglion cyst.  Persistent numbness at the tips of the thumb and middle finger.   Strength is intact with 5 out of 5 strength. Finger and thumb flexion and extension across the MP and IP joints and 5 out of 5 strength with pinch and       Assessment and Plan  1. Left carpal tunnel release and wrist ganglion cyst excision    2. Previous right carpal tunnel release and ganglion cyst excision    She is doing well following left wrist procedure. She has some mild soreness in the hand but feels that her sensation is improved and she has good strength. Her biggest concern currently is incomplete recovery of her right hand numbness following the previous carpal tunnel release. I explained to her that the nerve may continue to improve over time with continued use of the hand and that full recovery may not be achieved until a few months after the surgery. I recommended that she advance her activities as tolerated. She will return to work as previously scheduled later this month. I have encouraged her to continue working with scar massage and stretching and continue to advance strengthening and endurance activities as tolerated. I would like her to follow-up in 2 months for check of her recovery to see if there has been any improvement in her sensation along the right hand.   We discussed the potential for repeat EMG in about 6 months postoperatively if she has not realized much improvement        Electronically signed by Heri Crabtree MD on 11/10/2020 at 2:05 PM

## 2020-11-10 NOTE — PATIENT INSTRUCTIONS
Continue to work on ROM exercises and massage the incision site  May take Ibuprofen or Motrin as needed for pain  Continue with plan to return to work on 11/23/20  Follow up in 2 months

## 2020-11-12 ENCOUNTER — NURSE TRIAGE (OUTPATIENT)
Dept: OTHER | Facility: CLINIC | Age: 50
End: 2020-11-12

## 2020-11-12 ENCOUNTER — APPOINTMENT (OUTPATIENT)
Dept: CT IMAGING | Age: 50
End: 2020-11-12
Payer: COMMERCIAL

## 2020-11-12 ENCOUNTER — HOSPITAL ENCOUNTER (EMERGENCY)
Age: 50
Discharge: HOME | End: 2020-11-12
Attending: EMERGENCY MEDICINE
Payer: COMMERCIAL

## 2020-11-12 VITALS
DIASTOLIC BLOOD PRESSURE: 86 MMHG | OXYGEN SATURATION: 95 % | SYSTOLIC BLOOD PRESSURE: 144 MMHG | HEART RATE: 66 BPM | WEIGHT: 270 LBS | BODY MASS INDEX: 43.39 KG/M2 | RESPIRATION RATE: 14 BRPM | TEMPERATURE: 98.1 F | HEIGHT: 66 IN

## 2020-11-12 LAB
ALBUMIN SERPL-MCNC: 3.9 GM/DL (ref 3.4–5)
ALP BLD-CCNC: 152 IU/L (ref 40–129)
ALT SERPL-CCNC: 26 U/L (ref 10–40)
ANION GAP SERPL CALCULATED.3IONS-SCNC: 7 MMOL/L (ref 4–16)
AST SERPL-CCNC: 35 IU/L (ref 15–37)
BACTERIA: NEGATIVE /HPF
BASOPHILS ABSOLUTE: 0 K/CU MM
BASOPHILS RELATIVE PERCENT: 0.3 % (ref 0–1)
BILIRUB SERPL-MCNC: 0.4 MG/DL (ref 0–1)
BILIRUBIN URINE: NEGATIVE MG/DL
BLOOD, URINE: NEGATIVE
BUN BLDV-MCNC: 17 MG/DL (ref 6–23)
CALCIUM SERPL-MCNC: 9.5 MG/DL (ref 8.3–10.6)
CAST TYPE: NORMAL /HPF
CHLORIDE BLD-SCNC: 98 MMOL/L (ref 99–110)
CLARITY: CLEAR
CO2: 33 MMOL/L (ref 21–32)
COLOR: YELLOW
CREAT SERPL-MCNC: 0.7 MG/DL (ref 0.6–1.1)
CRYSTAL TYPE: NORMAL /HPF
DIFFERENTIAL TYPE: ABNORMAL
EOSINOPHILS ABSOLUTE: 0.2 K/CU MM
EOSINOPHILS RELATIVE PERCENT: 1.3 % (ref 0–3)
EPITHELIAL CELLS, UA: NORMAL /HPF
GFR AFRICAN AMERICAN: >60 ML/MIN/1.73M2
GFR NON-AFRICAN AMERICAN: >60 ML/MIN/1.73M2
GLUCOSE BLD-MCNC: 149 MG/DL (ref 70–99)
GLUCOSE, URINE: NEGATIVE MG/DL
HCT VFR BLD CALC: 45 % (ref 37–47)
HEMOGLOBIN: 14.8 GM/DL (ref 12.5–16)
IMMATURE NEUTROPHIL %: 0.4 % (ref 0–0.43)
KETONES, URINE: NEGATIVE MG/DL
LEUKOCYTE ESTERASE, URINE: NEGATIVE
LIPASE: 28 IU/L (ref 13–60)
LYMPHOCYTES ABSOLUTE: 2.4 K/CU MM
LYMPHOCYTES RELATIVE PERCENT: 17.3 % (ref 24–44)
MCH RBC QN AUTO: 28.4 PG (ref 27–31)
MCHC RBC AUTO-ENTMCNC: 32.9 % (ref 32–36)
MCV RBC AUTO: 86.4 FL (ref 78–100)
MONOCYTES ABSOLUTE: 0.8 K/CU MM
MONOCYTES RELATIVE PERCENT: 5.6 % (ref 0–4)
MUCUS: NEGATIVE HPF
NITRITE URINE, QUANTITATIVE: NEGATIVE
PDW BLD-RTO: 13 % (ref 11.7–14.9)
PH, URINE: 6.5 (ref 5–8)
PLATELET # BLD: 286 K/CU MM (ref 140–440)
PMV BLD AUTO: 9.6 FL (ref 7.5–11.1)
POTASSIUM SERPL-SCNC: 3.7 MMOL/L (ref 3.5–5.1)
PROTEIN UA: NEGATIVE MG/DL
RBC # BLD: 5.21 M/CU MM (ref 4.2–5.4)
RBC URINE: NORMAL /HPF (ref 0–6)
SEGMENTED NEUTROPHILS ABSOLUTE COUNT: 10.2 K/CU MM
SEGMENTED NEUTROPHILS RELATIVE PERCENT: 75.1 % (ref 36–66)
SODIUM BLD-SCNC: 138 MMOL/L (ref 135–145)
SPECIFIC GRAVITY UA: 1.02 (ref 1–1.03)
TOTAL IMMATURE NEUTOROPHIL: 0.06 K/CU MM
TOTAL PROTEIN: 7 GM/DL (ref 6.4–8.2)
UROBILINOGEN, URINE: 0.2 MG/DL (ref 0.2–1)
VOLUME, (UVOL): 12 ML (ref 10–12)
WBC # BLD: 13.6 K/CU MM (ref 4–10.5)
WBC UA: NORMAL /HPF (ref 0–5)

## 2020-11-12 PROCEDURE — 93005 ELECTROCARDIOGRAM TRACING: CPT | Performed by: EMERGENCY MEDICINE

## 2020-11-12 PROCEDURE — 80053 COMPREHEN METABOLIC PANEL: CPT

## 2020-11-12 PROCEDURE — 99284 EMERGENCY DEPT VISIT MOD MDM: CPT

## 2020-11-12 PROCEDURE — 81001 URINALYSIS AUTO W/SCOPE: CPT

## 2020-11-12 PROCEDURE — 83690 ASSAY OF LIPASE: CPT

## 2020-11-12 PROCEDURE — 85025 COMPLETE CBC W/AUTO DIFF WBC: CPT

## 2020-11-12 PROCEDURE — 6360000004 HC RX CONTRAST MEDICATION: Performed by: EMERGENCY MEDICINE

## 2020-11-12 PROCEDURE — 74177 CT ABD & PELVIS W/CONTRAST: CPT

## 2020-11-12 RX ORDER — ONDANSETRON 4 MG/1
4 TABLET, ORALLY DISINTEGRATING ORAL EVERY 8 HOURS PRN
Qty: 15 TABLET | Refills: 0 | Status: SHIPPED | OUTPATIENT
Start: 2020-11-12 | End: 2021-01-27

## 2020-11-12 RX ORDER — OMEPRAZOLE 20 MG/1
20 CAPSULE, DELAYED RELEASE ORAL DAILY
Qty: 30 CAPSULE | Refills: 0 | Status: SHIPPED | OUTPATIENT
Start: 2020-11-12 | End: 2021-01-27

## 2020-11-12 RX ADMIN — IOPAMIDOL 100 ML: 755 INJECTION, SOLUTION INTRAVENOUS at 18:51

## 2020-11-12 ASSESSMENT — PAIN DESCRIPTION - DESCRIPTORS: DESCRIPTORS: DULL

## 2020-11-12 ASSESSMENT — PAIN DESCRIPTION - FREQUENCY: FREQUENCY: CONTINUOUS

## 2020-11-12 ASSESSMENT — PAIN DESCRIPTION - PAIN TYPE: TYPE: ACUTE PAIN

## 2020-11-12 ASSESSMENT — PAIN DESCRIPTION - LOCATION: LOCATION: ABDOMEN

## 2020-11-12 ASSESSMENT — PAIN DESCRIPTION - ORIENTATION: ORIENTATION: MID;UPPER

## 2020-11-12 ASSESSMENT — PAIN SCALES - GENERAL: PAINLEVEL_OUTOF10: 2

## 2020-11-12 NOTE — TELEPHONE ENCOUNTER
Reason for Disposition   Pain lasting > 10 minutes and over 48years old    Answer Assessment - Initial Assessment Questions  1. LOCATION: \"Where does it hurt? \"       Middle upper abdomen. Below sternum    2. RADIATION: \"Does the pain shoot anywhere else? \" (e.g., chest, back)      Denies, or maybe a bit to the left at times. Dull pain. Hurts more to take a deep breath. 3. ONSET: \"When did the pain begin? \" (e.g., minutes, hours or days ago)    a week ago    4. SUDDEN: \"Gradual or sudden onset? \"   suddenly    5. PATTERN \"Does the pain come and go, or is it constant? \"     - If constant: \"Is it getting better, staying the same, or worsening? \"       (Note: Constant means the pain never goes away completely; most serious pain is constant and it progresses)      - If intermittent: \"How long does it last?\" \"Do you have pain now? \"      (Note: Intermittent means the pain goes away completely between bouts)      Comes and goes. 6. SEVERITY: \"How bad is the pain? \"  (e.g., Scale 1-10; mild, moderate, or severe)     - MILD (1-3): doesn't interfere with normal activities, abdomen soft and not tender to touch      - MODERATE (4-7): interferes with normal activities or awakens from sleep, tender to touch      - SEVERE (8-10): excruciating pain, doubled over, unable to do any normal activities        2/10    7. RECURRENT SYMPTOM: \"Have you ever had this type of abdominal pain before? \" If so, ask: \"When was the last time? \" and \"What happened that time? \"       One time, something like this, was when she had appendicitis. 8. AGGRAVATING FACTORS: \"Does anything seem to cause this pain? \" (e.g., foods, stress, alcohol)      Denies    9. CARDIAC SYMPTOMS: \"Do you have any of the following symptoms: chest pain, difficulty breathing, sweating, nausea? \"      Denies    10. OTHER SYMPTOMS: \"Do you have any other symptoms? \" (e.g., fever, vomiting, diarrhea)       Vomited this AM one episode     11.  PREGNANCY: \"Is there any chance you are pregnant? \" \"When was your last menstrual period? \"        denies    Protocols used: ABDOMINAL PAIN - UPPER-ADULT-[place]    Attention provider: Your patient utilized nurse triage services offered by employer, payer or community. This encounter includes an overview of the reason for call, assessment and recommended disposition. Please do not respond through this encounter as the response is not directed to a shared pool.

## 2020-11-12 NOTE — ED PROVIDER NOTES
Surgical History:   Procedure Laterality Date    ARM SURGERY Right 9/9/2020    RIGHT ULNAR NERVE TRANSPOSITION performed by Robbin Wright MD at [place] [address] Right 9/9/2020    RIGHT CARPAL TUNNEL RELEASE performed by Robbin Wright MD at [place] [address] Left 10/12/2020    LEFT CARPAL TUNNEL RELEASE, LEFT GANGLION CYST EXCISION performed by Robbin Wright MD at [address] [facility]  2003    HAND SURGERY Right 9/9/2020    RIGHT GANGLION CYST HAND LESION BIOPSY EXCISION performed by Robbin Wright MD at [address] [place] [address], [facility]  6/1/2015    bilateral ovaries not removed   [address] 5/31/2019    APPENDECTOMY LAPAROSCOPIC performed by Bobbi Thornton MD [facility] [address]     Family History   Problem Relation Age of Onset    Other Mother         thyroid issues    High Blood Pressure Mother     High Cholesterol Mother     Heart Disease Father     Coronary Art Dis Father     High Cholesterol Father     Other Father         Osteoarthritis    Other Maternal Grandmother         brain shunt; hydrocephalus    Heart Attack Maternal Grandfather     Alzheimer's Disease Paternal Grandmother     Heart Attack Paternal Grandfather     Stroke Paternal Grandfather     High Cholesterol Sister     Asthma Sister      Social History     Socioeconomic History    Marital status:      Spouse name: Not on file    Number of children: Not on file    Years of education: Not on file    Highest education level: Not on file   Occupational History    Not on file   Social Needs    Financial resource strain: Not on file    Food insecurity     Worry: Not on file     Inability: Not on file    Transportation needs     Medical: Not on file     Non-medical: Not on file   Tobacco Use    Smoking status: Former Smoker     Packs/day: 1.00     Years: 20.00     Pack years: 20.00     Types: Cigarettes     Last attempt to quit: 2007     Years since quittin.8    Smokeless tobacco: Never Used   Substance and Sexual Activity    Alcohol use: No    Drug use: No    Sexual activity: Yes     Partners: Male   Lifestyle    Physical activity     Days per week: Not on file     Minutes per session: Not on file    Stress: Not on file   Relationships    Social connections     Talks on phone: Not on file     Gets together: Not on file     Attends Religion service: Not on file     Active member of club or organization: Not on file     Attends meetings of clubs or organizations: Not on file     Relationship status: Not on file    Intimate partner violence     Fear of current or ex partner: Not on file     Emotionally abused: Not on file     Physically abused: Not on file     Forced sexual activity: Not on file   Other Topics Concern    Not on file   Social History Narrative    Not on file     No current facility-administered medications for this encounter.       Current Outpatient Medications   Medication Sig Dispense Refill    Ascorbic Acid (VITAMIN C PO) Take by mouth      TURMERIC PO Take by mouth      venlafaxine (EFFEXOR XR) 37.5 MG extended release capsule Take 1 capsule by mouth nightly With 75 mg capsule 90 capsule 1    Lactobacillus (ACIDOPHILUS) 100 MG CAPS Take by mouth daily      Glucosamine-Chondroitin (GLUCOSAMINE CHONDR COMPLEX PO) Take by mouth daily      vitamin B-12 (CYANOCOBALAMIN) 500 MCG tablet Take 500 mcg by mouth daily      docusate sodium (COLACE) 100 MG capsule Take 100 mg by mouth 2 times daily      Cinnamon (EQL CINNAMON) 500 MG CAPS Take 1,200 mg by mouth daily      APPLE CIDER VINEGAR PO Take 450 mg by mouth daily      famotidine (PEPCID) 20 MG tablet Take 20 mg by mouth nightly      magnesium gluconate (MAGONATE) 500 MG tablet Take 250 mg by mouth nightly      Potassium 99 MG TABS Take by mouth nightly      ibuprofen (ADVIL;MOTRIN) 200 MG tablet Take 200 mg by mouth every 6 hours as needed for Pain      venlafaxine (EFFEXOR XR) 75 MG extended release capsule TAKE 1 CAPSULE BY MOUTH ONCE DAILY (Patient taking differently: Take 75 mg by mouth nightly ) 90 capsule 1    hydroCHLOROthiazide (HYDRODIURIL) 50 MG tablet Take 1 tablet by mouth daily 90 tablet 3     Allergies   Allergen Reactions    Pcn [Penicillins] Hives     Any derivative       Nursing Notes Reviewed    Physical Exam:  ED Triage Vitals [11/12/20 1614]   Enc Vitals Group      BP (!) 144/86      Pulse 66      Resp 14      Temp 98.1 °F (36.7 °C)      Temp Source Oral      SpO2 95 %      Weight 270 lb (122.5 kg)      Height 5' 5.5\" (1.664 m)      Head Circumference       Peak Flow       Pain Score       Pain Loc       Pain Edu? Excl. in [address]? General appearance: Awake, alert, No acute distress. Neck:  Supple without rigidity  ENT: Normal posterior pharynx, moist mucus membranes  Heart:  Regular rate and rhythm, no murmurs  Respiratory:  Lungs clear to auscultation bilaterally. Normal effort. Abdominal:  Moderate epigastric diffuse tenderness to palpation, no rebound guarding or rigidity, negative Arciniega's sign, neg McBurney's point tenderness to palpation, normal bowel sounds, no masses, no organomegaly   Back:  No CVA tenderness. Extremity: normal ROM, no edema  Skin: Warm. Dry. No rash. Neurological:  Alert and oriented. No focal deficits.  Speech normal.  Psyc: Normal mood and affect    I have reviewed and interpreted all of the currently available lab results from this visit (if applicable):  Results for orders placed or performed during the hospital encounter of 11/12/20   CBC Auto Differential   Result Value Ref Range    WBC 13.6 (H) 4.0 - 10.5 K/CU MM    RBC 5.21 4.2 - 5.4 M/CU MM    Hemoglobin 14.8 12.5 - 16.0 GM/DL    Hematocrit 45.0 37 - 47 %    MCV 86.4 78 - 100 FL    MCH 28.4 27 - 31 PG    MCHC 32.9 32.0 - 36.0 %    RDW 13.0 11.7 - 14.9 %    Platelets 482 347 - 921 K/CU MM    MPV 9.6 7.5 - 11.1 FL Differential Type AUTOMATED DIFFERENTIAL     Segs Relative 75.1 (H) 36 - 66 %    Lymphocytes % 17.3 (L) 24 - 44 %    Monocytes % 5.6 (H) 0 - 4 %    Eosinophils % 1.3 0 - 3 %    Basophils % 0.3 0 - 1 %    Segs Absolute 10.2 K/CU MM    Lymphocytes Absolute 2.4 K/CU MM    Monocytes Absolute 0.8 K/CU MM    Eosinophils Absolute 0.2 K/CU MM    Basophils Absolute 0.0 K/CU MM    Immature Neutrophil % 0.4 0 - 0.43 %    Total Immature Neutrophil 0.06 K/CU MM   EKG 12 Lead   Result Value Ref Range    Ventricular Rate 73 BPM    Atrial Rate 73 BPM    P-R Interval 158 ms    QRS Duration 80 ms    Q-T Interval 416 ms    QTc Calculation (Bazett) 458 ms    P Axis 45 degrees    R Axis 48 degrees    T Axis 34 degrees    Diagnosis       Normal sinus rhythm  Nonspecific T wave abnormality  Abnormal ECG  No previous ECGs available       Labs Reviewed   CBC WITH AUTO DIFFERENTIAL - Abnormal; Notable for the following components:       Result Value    WBC 13.6 (*)     Segs Relative 75.1 (*)     Lymphocytes % 17.3 (*)     Monocytes % 5.6 (*)     All other components within normal limits   COMPREHENSIVE METABOLIC PANEL - Abnormal; Notable for the following components:    Chloride 98 (*)     CO2 33 (*)     Glucose 149 (*)     Alkaline Phosphatase 152 (*)     All other components within normal limits   URINALYSIS   LIPASE          This EKG was interpreted by me. Rate is 73, rhythm is sinus. MN and QT intervals are within normal limits. There is no ST segment or T wave changes. This EKG was compared to previous EKG from date 4/18/16. Chart review shows recent radiographs:  No results found. MDM:  This is a 64-year-old female who presented with 1 week of constant epigastric discomfort, the nausea and vomiting that started this morning. Thus far laboratory results show a leukocytosis of 13.6. Alkaline phosphatase of 152, other transaminases within normal range.   Base is normal.  Given epigastric discomfort with nausea and vomiting CT scan was ordered as I cannot currently get a right upper quadrant ultrasound. Currently awaiting the result of the study. Patient be signed out to the oncoming provider pending the result for disposition. Clinical Impression:  abdominal pain, nausea vomiting, enteritis, pulmonary nodule      (Please note that portions of this note may have been completed with a voice recognition program. Efforts were made to edit the dictations but occasionally words are mis-transcribed.)      Jonas Dakins, DO  11/12/20 1909      Patient CT scan did come back prior to me leaving my shift. CT scan shows enteritis. No other acute findings. She does have a 1.5 cm nodule in the left lung base. I did discuss this with her. She is not currently a smoker, had previously been a smoker years ago. Given this we discussed she should get an outpatient CT scan. I will push this through for monitoring through cardiothoracic specialist.  She is instructed on the finding, the need for follow-up for this. She is well-appearing, nontoxic, has a nonsurgical abdominal exam.  While her CT scan did not show evidence of cholecystitis she has had symptoms which sound concerning for gallbladder disease. Given this we discussed if her symptoms continue despite symptomatic care she should have outpatient ultrasound performed. We also discussed symptomatic care and reasons to return to the immediate emergency department. These were discussed in depth at the bedside. She voiced understanding the discharge instructions and return precautions.      Jonas Dakins, DO  11/12/20 1923

## 2020-11-12 NOTE — ED TRIAGE NOTES
Arrived ambulatory to room 5 for triage. Tolerated without difficulty. Bed in lowest position. Call light given.  Gowned for exam.

## 2020-11-13 LAB
EKG ATRIAL RATE: 73 BPM
EKG DIAGNOSIS: NORMAL
EKG P AXIS: 45 DEGREES
EKG P-R INTERVAL: 158 MS
EKG Q-T INTERVAL: 416 MS
EKG QRS DURATION: 80 MS
EKG QTC CALCULATION (BAZETT): 458 MS
EKG R AXIS: 48 DEGREES
EKG T AXIS: 34 DEGREES
EKG VENTRICULAR RATE: 73 BPM

## 2020-11-13 PROCEDURE — 93010 ELECTROCARDIOGRAM REPORT: CPT | Performed by: INTERNAL MEDICINE

## 2020-11-16 ENCOUNTER — TELEPHONE (OUTPATIENT)
Dept: ONCOLOGY | Age: 50
End: 2020-11-16

## 2020-11-16 NOTE — TELEPHONE ENCOUNTER
Returned call to pt and left message that Dr Myke Toro would like to have her see the NP within the next week or so. Requested pt return call to schedule.

## 2020-11-16 NOTE — TELEPHONE ENCOUNTER
Patient called and states she was in the ER last week and CT was abnormal, she would like Dr. Sue Webb to review the CT scan. Her follow up APT with our office is 12/17/2020 please advise if patient should follow up sooner.

## 2020-11-17 NOTE — PROGRESS NOTES
hematuria. Past Medical History: Allergies, depression/anxiety, diabetes, hypertension, obesity, headache, history of sinusitis. Past Surgery History:    Appendectomy due to appendicitis in 2019,  , hysterectomy , laminectomy , tonsillectomy in  or . Social History:   She smoked 1 pack/day for 20 years and quit in . She his usage in the early  denies any alcohol or illicit drug use. She has 1 daughter. Family History:   Maternal grandmother had possible colon cancer in her 79. Paternal and maternal aunt had breast cancer. Review of Systems: The remainder of ROS is unremarkable. Vital Signs: BP (!) 174/97 (Site: Left Wrist, Position: Sitting, Cuff Size: Large Adult)   Pulse 88   Temp 96.4 °F (35.8 °C) (Infrared)   Resp 16   Ht 5' 5.5\" (1.664 m)   Wt 271 lb 12.8 oz (123.3 kg)   SpO2 97%   BMI 44.54 kg/m²      Physical Exam:  CONSTITUTIONAL: awake, alert, cooperative, no apparent distress   EYES: pupils equal, round and reactive to light, sclera clear and conjunctiva normal  ENT: Normocephalic, without obvious abnormality, atraumatic  NECK: supple, symmetrical, no jugular venous distension and no carotid bruits   HEMATOLOGIC/LYMPHATIC: no cervical, supraclavicular or axillary lymphadenopathy   LUNGS: no increased work of breathing and clear to auscultation   CARDIOVASCULAR: regular rate and rhythm, normal S1 and S2, no murmur noted  ABDOMEN: normal bowel sounds x 4, soft, non-distended, non-tender, no masses palpated, no hepatosplenomegaly   MUSCULOSKELETAL: full range of motion noted, tone is normal  NEUROLOGIC: awake, alert, oriented to name, place and time. Motor skills grossly intact. SKIN: No jaundice. Bruises from recent surgery. Healing scar. EXTREMITIES: no LE edema. No cyanosis.       Labs:  Hematology:  Lab Results   Component Value Date    WBC 13.6 (H) 2020    RBC 5.21 2020    HGB 14.8 2020    HCT 45.0 2020    MCV 86.4 11/12/2020    MCH 28.4 11/12/2020    MCHC 32.9 11/12/2020    RDW 13.0 11/12/2020     11/12/2020    MPV 9.6 11/12/2020    SEGSPCT 75.1 (H) 11/12/2020    EOSRELPCT 1.3 11/12/2020    BASOPCT 0.3 11/12/2020    LYMPHOPCT 17.3 (L) 11/12/2020    MONOPCT 5.6 (H) 11/12/2020    SEGSABS 10.2 11/12/2020    EOSABS 0.2 11/12/2020    BASOSABS 0.0 11/12/2020    LYMPHSABS 2.4 11/12/2020    MONOSABS 0.8 11/12/2020    DIFFTYPE AUTOMATED DIFFERENTIAL 11/12/2020     Lab Results   Component Value Date    ESR 28 08/11/2020     Chemistry:  Lab Results   Component Value Date     11/12/2020    K 3.7 11/12/2020    CL 98 (L) 11/12/2020    CO2 33 (H) 11/12/2020    BUN 17 11/12/2020    CREATININE 0.7 11/12/2020    GLUCOSE 149 (H) 11/12/2020    CALCIUM 9.5 11/12/2020    PROT 7.0 11/12/2020    LABALBU 3.9 11/12/2020    BILITOT 0.4 11/12/2020    ALKPHOS 152 (H) 11/12/2020    AST 35 11/12/2020    ALT 26 11/12/2020    LABGLOM >60 11/12/2020    GFRAA >60 11/12/2020    AGRATIO 1.5 07/29/2020    GLOB 2.8 07/29/2020    POCGLU 132 (H) 10/12/2020     Lab Results   Component Value Date    TSHHS 1.190 04/18/2016    T4FREE 1.26 04/18/2016     Immunology:  Lab Results   Component Value Date    PROT 7.0 11/12/2020      Observations:  PHQ-9 Total Score: 0 (11/18/2020  1:39 PM)    Assessment & Plan:    1. She has nonspecific mild leukocytosis which could be constitutional to her. Flow cytometry, RT-PCR for BCR abl, and ESR were unremarkable. I will continue to monitor CBC. 2.  She was found to have solitary left lung nodule on CT abdomen pelvis November 2020. I will order CT chest to follow with for further lung nodule. I scheduled for percutaneous biopsy. 3.  We discussed about healthy diet and lifestyle. Return to clinic in  3 weeks or sooner. All of her questions have been answered for today.     Electronically signed by Karson Cobb MD on 8/11/20 at 8:56 AM EDT

## 2020-11-18 ENCOUNTER — HOSPITAL ENCOUNTER (OUTPATIENT)
Dept: INFUSION THERAPY | Age: 50
Discharge: HOME | End: 2020-11-18
Payer: COMMERCIAL

## 2020-11-18 ENCOUNTER — OFFICE VISIT (OUTPATIENT)
Dept: ONCOLOGY | Age: 50
End: 2020-11-18
Payer: COMMERCIAL

## 2020-11-18 VITALS
HEIGHT: 66 IN | DIASTOLIC BLOOD PRESSURE: 97 MMHG | OXYGEN SATURATION: 97 % | TEMPERATURE: 96.4 F | SYSTOLIC BLOOD PRESSURE: 174 MMHG | BODY MASS INDEX: 43.68 KG/M2 | WEIGHT: 271.8 LBS | RESPIRATION RATE: 16 BRPM | HEART RATE: 88 BPM

## 2020-11-18 PROCEDURE — 99211 OFF/OP EST MAY X REQ PHY/QHP: CPT

## 2020-11-18 PROCEDURE — 99214 OFFICE O/P EST MOD 30 MIN: CPT | Performed by: INTERNAL MEDICINE

## 2020-11-18 ASSESSMENT — PATIENT HEALTH QUESTIONNAIRE - PHQ9
SUM OF ALL RESPONSES TO PHQ9 QUESTIONS 1 & 2: 0
SUM OF ALL RESPONSES TO PHQ QUESTIONS 1-9: 0
2. FEELING DOWN, DEPRESSED OR HOPELESS: 0
1. LITTLE INTEREST OR PLEASURE IN DOING THINGS: 0

## 2020-11-18 NOTE — PROGRESS NOTES
MA Rooming Questions  Patient: Altagracia Arora  MRN: [id number]    Date: 11/18/2020        1. Do you have any new issues?   no         2. Do you need any refills on medications?    no    3. Have you had any imaging done since your last visit?   no    4. Have you been hospitalized or seen in the emergency room since your last visit here?   no    5. Did the patient have a depression screening completed today?  Yes    PHQ-9 Total Score: 0 (11/18/2020  1:39 PM)       PHQ-9 Given to (if applicable):               PHQ-9 Score (if applicable):                     [] Positive     []  Negative              Does question #9 need addressed (if applicable)                     [] Yes    []  No               Patricia Peters MA

## 2020-11-26 ENCOUNTER — NURSE TRIAGE (OUTPATIENT)
Dept: OTHER | Facility: CLINIC | Age: 50
End: 2020-11-26

## 2020-11-27 ENCOUNTER — HOSPITAL ENCOUNTER (OUTPATIENT)
Dept: CT IMAGING | Age: 50
Discharge: HOME | End: 2020-11-27
Payer: COMMERCIAL

## 2020-11-27 PROCEDURE — 71260 CT THORAX DX C+: CPT

## 2020-11-27 PROCEDURE — 6360000004 HC RX CONTRAST MEDICATION: Performed by: INTERNAL MEDICINE

## 2020-11-27 RX ADMIN — IOPAMIDOL 100 ML: 755 INJECTION, SOLUTION INTRAVENOUS at 15:23

## 2020-11-27 NOTE — TELEPHONE ENCOUNTER
Reason for Disposition   Systolic BP  >= 078 OR Diastolic >= 235    Answer Assessment - Initial Assessment Questions  1. BLOOD PRESSURE: \"What is the blood pressure? \" \"Did you take at least two measurements 5 minutes apart?\"         152/94 now 166/95    2. ONSET: \"When did you take your blood pressure? \"     While on the phone    3. HOW: \"How did you obtain the blood pressure? \" (e.g., visiting nurse, automatic home BP monitor)       automatic home BP monitor    4. HISTORY: \"Do you have a history of high blood pressure? \"      Yes    5. MEDICATIONS: [place] you taking any medications for blood pressure? \" \"Have you missed any doses recently? \"       Yes and has not missed any doses, HCTZ    6. OTHER SYMPTOMS: \"Do you have any symptoms? \" (e.g., headache, chest pain, blurred vision, difficulty breathing, weakness)     Tachycardia and Acid reflux    7. PREGNANCY: \"Is there any chance you are pregnant? \" \"When was your last menstrual period? \"    No    Caller stated that she is having high blood pressure  149/104  Then 166/95 with a pulse of 102. Caller was informed to check her blood pressure again which was, Caller stated she has a history of hypertension and has not missed a dose. Caller stated that she is going to have a CT tomorrow of her lungs in relation to nodules that were observed. Caller was informed to be evaluated with her PCP within 3 days and to call back for additional questions or if worsening and agreed.     Protocols used: HIGH BLOOD PRESSURE-ADULT-AH

## 2020-12-03 ENCOUNTER — OFFICE VISIT (OUTPATIENT)
Dept: ONCOLOGY | Age: 50
End: 2020-12-03
Payer: COMMERCIAL

## 2020-12-03 ENCOUNTER — HOSPITAL ENCOUNTER (OUTPATIENT)
Dept: INFUSION THERAPY | Age: 50
Discharge: HOME | End: 2020-12-03
Payer: COMMERCIAL

## 2020-12-03 VITALS
HEIGHT: 66 IN | DIASTOLIC BLOOD PRESSURE: 74 MMHG | RESPIRATION RATE: 16 BRPM | WEIGHT: 272 LBS | SYSTOLIC BLOOD PRESSURE: 145 MMHG | BODY MASS INDEX: 43.71 KG/M2 | TEMPERATURE: 96.9 F | OXYGEN SATURATION: 98 % | HEART RATE: 67 BPM

## 2020-12-03 PROCEDURE — 99213 OFFICE O/P EST LOW 20 MIN: CPT | Performed by: INTERNAL MEDICINE

## 2020-12-03 PROCEDURE — 99211 OFF/OP EST MAY X REQ PHY/QHP: CPT

## 2020-12-03 NOTE — PROGRESS NOTES
MA Rooming Questions  Patient: Demian Lerma  MRN: [id number]    Date: 12/3/2020        1. Do you have any new issues?   no         2. Do you need any refills on medications?    no    3. Have you had any imaging done since your last visit? yes - CT 11/27    4. Have you been hospitalized or seen in the emergency room since your last visit here?   no    5. Did the patient have a depression screening completed today?  No    No data recorded     PHQ-9 Given to (if applicable):               PHQ-9 Score (if applicable):                     [] Positive     []  Negative              Does question #9 need addressed (if applicable)                     [] Yes    []  No               Pretty Noel MA

## 2020-12-03 NOTE — PROGRESS NOTES
Patient Name:  Julissa Sheffield  Patient :  1970  Patient MRN:  [id number]     Primary Oncologist: Jay Gowers, MD  Referring Provider: SCOOTER Flores CNP     Date of Service: 12/3/2020     Chief Complaint:    Chief Complaint   Patient presents with    Follow-up    Results      She came in for follow up visit. Patient Active Problem List:     Pre-diabetes     Essential hypertension     Anxiety     Rash     HPI:   Kaitlyn Hutchison is a pleasant 47-year-old  female patient who was referred for evaluation of leukocytosis. I reviewed her blood test record. CBC in 2019 was unremarkable with WBC 9.6, hemoglobin 14.5, platelet 873. In May 2019 WBC was 13.6, hemoglobin 14.9, platelet 240. In 2020 WBC was 11.3, hemoglobin 14.1, platelet 183. In 2019 WBC was 12.7, hemoglobin 14, platelet 185. She has 2 cats and 2 dogs. She has mammogram every 2 years. Mammogram in 2019 was benign. She has Cologuard. She has cats at home and was to have allergies to cats. ESR, flow cytometry study, RT-PCR for BCR abl from peripheral blood were unremarkable. She had recent ulnar nerve release, left carpal tunnel and ganglion removal.    On 2020 she came in for follow-up visit after discharge from the emergency room. She went to the emergency room in 2020 due to abdominal pain. CT abdomen pelvis on 2020 showed  Impression    Mild enteritis.         1.5 cm in diameter nodule in the left lung base. Abdominal pain has improved. I reviewed the CT with her and compared to the previous CT in 2019. She is agreeable to have CT chest and referral to interventional radiologist for percutaneous biopsy. She has COVID-19 testing twice a week since she works at [employer]. On December 3, 2020 she came in for follow-up visit.   CT chest on 2020 showed  1. 1.2 cm noncalcified nodule in the medial left lower lobe, similar to prior    study given difference in technique. 2. No acute intrathoracic abnormality. I discussed with her options of follow-up imaging study in 3 months versus tissue sampling. She is agreeable to see IR for percutaneous biopsy. She denied any  fever or chills. No headache or dizziness. No chest pain or shortness of breath. No dysuria or hematuria. Past Medical History: Allergies, depression/anxiety, diabetes, hypertension, obesity, headache, history of sinusitis. Past Surgery History:    Appendectomy due to appendicitis in ,  , hysterectomy , laminectomy , tonsillectomy in  or . Social History:   She smoked 1 pack/day for 20 years and quit in . She his usage in the early  denies any alcohol or illicit drug use. She has 1 daughter. Family History:   Maternal grandmother had possible colon cancer in her 79. Paternal and maternal aunt had breast cancer. Review of Systems: The remainder of ROS is unremarkable.      Vital Signs: BP (!) 145/74 (Site: Right Upper Arm, Position: Sitting, Cuff Size: Medium Adult)   Pulse 67   Temp 96.9 °F (36.1 °C) (Infrared)   Resp 16   Ht 5' 5.5\" (1.664 m)   Wt 272 lb (123.4 kg)   SpO2 98%   BMI 44.57 kg/m²      Physical Exam:  CONSTITUTIONAL: awake, alert, cooperative, no apparent distress   EYES: pupils equal, round and reactive to light, sclera clear and conjunctiva normal  ENT: Normocephalic, without obvious abnormality, atraumatic  NECK: supple, symmetrical, no jugular venous distension and no carotid bruits   HEMATOLOGIC/LYMPHATIC: no cervical, supraclavicular or axillary lymphadenopathy   LUNGS: no increased work of breathing and clear to auscultation   CARDIOVASCULAR: regular rate and rhythm, normal S1 and S2, no murmur noted  ABDOMEN: normal bowel sounds x 4, soft, non-distended, non-tender, no masses palpated, no hepatosplenomegaly   MUSCULOSKELETAL: full range of motion noted, tone is normal  NEUROLOGIC: awake, alert, oriented to name, place and time. Motor skills grossly intact. SKIN: No jaundice. Healing scar. No petechial rash. EXTREMITIES: no LE edema. No cyanosis. Labs:  Hematology:  Lab Results   Component Value Date    WBC 13.6 (H) 11/12/2020    RBC 5.21 11/12/2020    HGB 14.8 11/12/2020    HCT 45.0 11/12/2020    MCV 86.4 11/12/2020    MCH 28.4 11/12/2020    MCHC 32.9 11/12/2020    RDW 13.0 11/12/2020     11/12/2020    MPV 9.6 11/12/2020    SEGSPCT 75.1 (H) 11/12/2020    EOSRELPCT 1.3 11/12/2020    BASOPCT 0.3 11/12/2020    LYMPHOPCT 17.3 (L) 11/12/2020    MONOPCT 5.6 (H) 11/12/2020    SEGSABS 10.2 11/12/2020    EOSABS 0.2 11/12/2020    BASOSABS 0.0 11/12/2020    LYMPHSABS 2.4 11/12/2020    MONOSABS 0.8 11/12/2020    DIFFTYPE AUTOMATED DIFFERENTIAL 11/12/2020     Lab Results   Component Value Date    ESR 28 08/11/2020     Chemistry:  Lab Results   Component Value Date     11/12/2020    K 3.7 11/12/2020    CL 98 (L) 11/12/2020    CO2 33 (H) 11/12/2020    BUN 17 11/12/2020    CREATININE 0.7 11/12/2020    GLUCOSE 149 (H) 11/12/2020    CALCIUM 9.5 11/12/2020    PROT 7.0 11/12/2020    LABALBU 3.9 11/12/2020    BILITOT 0.4 11/12/2020    ALKPHOS 152 (H) 11/12/2020    AST 35 11/12/2020    ALT 26 11/12/2020    LABGLOM >60 11/12/2020    GFRAA >60 11/12/2020    AGRATIO 1.5 07/29/2020    GLOB 2.8 07/29/2020    POCGLU 132 (H) 10/12/2020     Lab Results   Component Value Date    TSHHS 1.190 04/18/2016    T4FREE 1.26 04/18/2016     Immunology:  Lab Results   Component Value Date    PROT 7.0 11/12/2020      Observations:  No data recorded    Assessment & Plan:    1. She has nonspecific mild leukocytosis which could be constitutional to her. Flow cytometry, RT-PCR for BCR abl, and ESR were unremarkable. I will continue to monitor CBC. 2.  She was found to have solitary left lung nodule on CT abdomen pelvis November 2020. CT chest in November 2020 was reviewed.   I referred to IR for percutaneous biopsy

## 2020-12-10 ENCOUNTER — CLINICAL DOCUMENTATION (OUTPATIENT)
Dept: ONCOLOGY | Age: 50
End: 2020-12-10

## 2020-12-10 ENCOUNTER — TELEPHONE (OUTPATIENT)
Dept: ONCOLOGY | Age: 50
End: 2020-12-10

## 2021-01-07 ENCOUNTER — OFFICE VISIT (OUTPATIENT)
Dept: ONCOLOGY | Age: 51
End: 2021-01-07
Payer: COMMERCIAL

## 2021-01-07 ENCOUNTER — HOSPITAL ENCOUNTER (OUTPATIENT)
Dept: INFUSION THERAPY | Age: 51
Discharge: HOME | End: 2021-01-07
Payer: COMMERCIAL

## 2021-01-07 VITALS
BODY MASS INDEX: 42.64 KG/M2 | WEIGHT: 265.32 LBS | OXYGEN SATURATION: 97 % | DIASTOLIC BLOOD PRESSURE: 85 MMHG | HEIGHT: 66 IN | HEART RATE: 94 BPM | TEMPERATURE: 96.9 F | SYSTOLIC BLOOD PRESSURE: 147 MMHG | RESPIRATION RATE: 18 BRPM

## 2021-01-07 DIAGNOSIS — R07.9 CHEST PAIN, UNSPECIFIED TYPE: ICD-10-CM

## 2021-01-07 DIAGNOSIS — R91.1 LUNG NODULE, SOLITARY: Primary | ICD-10-CM

## 2021-01-07 PROCEDURE — 99211 OFF/OP EST MAY X REQ PHY/QHP: CPT

## 2021-01-07 PROCEDURE — 99244 OFF/OP CNSLTJ NEW/EST MOD 40: CPT | Performed by: INTERNAL MEDICINE

## 2021-01-07 NOTE — PROGRESS NOTES
MA Rooming Questions  Patient: Lian Cross  MRN: [id number]    Date: 1/7/2021        1. Do you have any new issues? yes - Biopsy has not been done         2. Do you need any refills on medications?    no    3. Have you had any imaging done since your last visit?   no    4. Have you been hospitalized or seen in the emergency room since your last visit here?   no    5. Did the patient have a depression screening completed today?  No    No data recorded     PHQ-9 Given to (if applicable):               PHQ-9 Score (if applicable):                     [] Positive     []  Negative              Does question #9 need addressed (if applicable)                     [] Yes    []  No               Sean Robison MA

## 2021-01-07 NOTE — PROGRESS NOTES
Patient Name:  Rosy Fitch  Patient :  1970  Patient MRN:  [id number]     Primary Oncologist: Cristi Alejandra MD  Referring Provider: SCOOTER Earl CNP     Date of Service: 2021     Chief Complaint:    Chief Complaint   Patient presents with    Follow-up      She came in for follow up visit. Patient Active Problem List:     Pre-diabetes     Essential hypertension     Anxiety     Rash     HPI:   Anita Acevedo is a pleasant 78-year-old  female patient who was referred for evaluation of leukocytosis. I reviewed her blood test record. CBC in 2019 was unremarkable with WBC 9.6, hemoglobin 14.5, platelet 401. In May 2019 WBC was 13.6, hemoglobin 14.9, platelet 838. In 2020 WBC was 11.3, hemoglobin 14.1, platelet 869. In 2019 WBC was 12.7, hemoglobin 14, platelet 829. She has 2 cats and 2 dogs. She has mammogram every 2 years. Mammogram in 2019 was benign. She has Cologuard. She has cats at home and was to have allergies to cats. ESR, flow cytometry study, RT-PCR for BCR abl from peripheral blood were unremarkable. She had ulnar nerve release, left carpal tunnel and ganglion removal.    She went to the emergency room in 2020 due to abdominal pain. CT abdomen pelvis on 2020 showed  Impression    Mild enteritis.         1.5 cm in diameter nodule in the left lung base. Abdominal pain has improved. I reviewed the CT with her and compared to the previous CT in 2019. She has COVID-19 testing twice a week since she works at [employer]. CT chest on 2020 showed  1. 1.2 cm noncalcified nodule in the medial left lower lobe, similar to prior    study given difference in technique. 2. No acute intrathoracic abnormality. I discussed with her options of follow-up imaging study in 3 months versus tissue sampling. She is agreeable to see IR for percutaneous biopsy.     On 2021 she came in for follow up visit.  IR canceled biopsy due to location of lung nodule and recommended follow up CT chest in 3 months. She complains of left lower chest pain. I ordered CT chest within 1 or 2 weeks. She is willing to see thoracic surgeon to discuss for potential wedge resection. She denied any  fever or chills. No headache or dizziness. She denies any nausea, vomiting or diarrhea. She denied any melena or hematochezia. No dysuria or hematuria. Past Medical History: Allergies, depression/anxiety, diabetes, hypertension, obesity, headache, history of sinusitis. Past Surgery History:    Appendectomy due to appendicitis in ,  , hysterectomy , laminectomy , tonsillectomy in  or . Social History:   She smoked 1 pack/day for 20 years and quit in . She his usage in the early  denies any alcohol or illicit drug use. She has 1 daughter. Family History:   Maternal grandmother had possible colon cancer in her 79. Paternal and maternal aunt had breast cancer. Review of Systems: The remainder of ROS is unremarkable.      Vital Signs: BP (!) 147/85 (Site: Left Upper Arm, Position: Sitting, Cuff Size: Large Adult)   Pulse 94   Temp 96.9 °F (36.1 °C) (Infrared)   Resp 18   Ht 5' 5.5\" (1.664 m)   Wt 265 lb 5.1 oz (120.3 kg)   SpO2 97%   BMI 43.48 kg/m²      Physical Exam:  CONSTITUTIONAL: awake, alert, cooperative, no apparent distress   EYES: pupils equal, round and reactive to light, sclera clear and conjunctiva normal  ENT: Normocephalic, without obvious abnormality, atraumatic  NECK: supple, symmetrical, no jugular venous distension and no carotid bruits   HEMATOLOGIC/LYMPHATIC: no cervical, supraclavicular or axillary lymphadenopathy   LUNGS: no increased work of breathing and clear to auscultation   CARDIOVASCULAR: regular rate and rhythm, normal S1 and S2, no murmur noted  ABDOMEN: normal bowel sounds x 4, soft, non-distended, non-tender, no masses palpated, no November 2020. CT chest in November 2020 was reviewed. I referred to IR for percutaneous biopsy of the left lung nodule. Biopsy was canceled by interventional radiologist due to the location of the  lung nodule. She is agreeable to see thoracic surgeon for discussion of potential wedge resection. 3.  We discussed about healthy diet and lifestyle. She complains of increasing left lower posterior chest pain. I order CT chest within 1 or 2 weeks. Return to clinic in 3 weeks or sooner. All of her questions have been answered for today.     Electronically signed by Cheryl Lora MD on 8/11/20 at 8:56 AM EDT

## 2021-01-11 PROBLEM — R91.8 LUNG MASS: Status: ACTIVE | Noted: 2021-01-11

## 2021-01-12 ENCOUNTER — OFFICE VISIT (OUTPATIENT)
Dept: ORTHOPEDIC SURGERY | Age: 51
End: 2021-01-12
Payer: COMMERCIAL

## 2021-01-12 VITALS
BODY MASS INDEX: 42.59 KG/M2 | WEIGHT: 265 LBS | HEIGHT: 66 IN | OXYGEN SATURATION: 97 % | RESPIRATION RATE: 18 BRPM | HEART RATE: 76 BPM

## 2021-01-12 DIAGNOSIS — Z98.890 S/P EXCISION OF GANGLION CYST: ICD-10-CM

## 2021-01-12 DIAGNOSIS — Z98.890 S/P CARPAL TUNNEL RELEASE: Primary | ICD-10-CM

## 2021-01-12 PROCEDURE — 99212 OFFICE O/P EST SF 10 MIN: CPT | Performed by: ORTHOPAEDIC SURGERY

## 2021-01-12 NOTE — PROGRESS NOTES
Patient returns to the office 12 weeks post operatively following a left carpal tunnel release that was performed on 10/12/2020. Since last office visit on 11/10/2020, previously reported symptoms of numbness within the middle and ring finger continue along the right hand with resolve in the thumb and bilateral wrist. No pain reported at this time and she continues to work on ROM and strengthening exercises without complication having returned to normal work duties. No concerns at this time.

## 2021-01-12 NOTE — PATIENT INSTRUCTIONS
Continue to work on ROM and strengthening exercises   May take Ibuprofen as needed   Rest, ice, and elevate as needed  Weightbearing and activities as tolerated  Follow up as needed

## 2021-01-16 NOTE — PROGRESS NOTES
1/12/2021   Chief Complaint   Patient presents with    Post-Op Check     left CTR DOS 10/12/2020        History of Present Illness:                             Shante Koo is a 44 y.o. female who returns today for follow-up of her bilateral carpal tunnel releases and ganglion cyst excisions. Her left hand is doing extremely well and has recovered full function and sensation. Her sensation has continued to improve in the right hand but still with some mild residuals numbness at the index finger middle finger. She has good recovery of her strength and feels that she is functioning at a high level and she is very pleased with her progress. Patient returns to the office 12 weeks post operatively following a left carpal tunnel release that was performed on 10/12/2020. Since last office visit on 11/10/2020, previously reported symptoms of numbness within the middle and ring finger continue along the right hand with resolve in the thumb and bilateral wrist. No pain reported at this time and she continues to work on ROM and strengthening exercises without complication having returned to normal work duties. No concerns at this time. Examination:  General Exam:  Vitals: Pulse 76   Resp 18   Ht 5' 5.5\" (1.664 m)   Wt 265 lb (120.2 kg)   SpO2 97%   BMI 43.43 kg/m²    Physical Exam   Operative Extremity:    Left upper extremity:  Well-healed surgical scars over the palmar aspect of the hand radial aspect of the wrist and elbow. Full painless active range of motion present throughout the elbow wrist and hand. Sensation is intact light touch and much improved prior to surgery. Strength is 5 out of 5 with  and finger flexion and extension.     Right upper extremity: Well-healed surgical scars over the volar aspect of the hand, radial aspect of the wrist, and elbow. Improved sensation to light touch with no numbness in the thumb but there is mild persistent decreased sensation at the tip of the index finger and middle finger. Strength is 5/5.  and pinch and finger flexion. Assessment and Plan  1. Bilateral carpal tunnel releases ganglion cyst excision ulnar nerve decompression    She has continued to improve and is pleased with her progress. Her sensation has fully recovered in the left hand and is mostly recovered and right. Encouraged her to continue working with stretching and building up strength in the hands. She will advance her activities as tolerated and follow-up as needed.         Electronically signed by León Nava MD on 1/16/2021 at 2:37 PM

## 2021-01-18 ENCOUNTER — HOSPITAL ENCOUNTER (OUTPATIENT)
Dept: PET IMAGING | Age: 51
Discharge: HOME | End: 2021-01-18
Payer: COMMERCIAL

## 2021-01-18 DIAGNOSIS — R91.8 LUNG MASS: ICD-10-CM

## 2021-01-18 PROCEDURE — A9552 F18 FDG: HCPCS | Performed by: THORACIC SURGERY (CARDIOTHORACIC VASCULAR SURGERY)

## 2021-01-18 PROCEDURE — 2580000003 HC RX 258: Performed by: THORACIC SURGERY (CARDIOTHORACIC VASCULAR SURGERY)

## 2021-01-18 PROCEDURE — 3430000000 HC RX DIAGNOSTIC RADIOPHARMACEUTICAL: Performed by: THORACIC SURGERY (CARDIOTHORACIC VASCULAR SURGERY)

## 2021-01-18 PROCEDURE — 78815 PET IMAGE W/CT SKULL-THIGH: CPT

## 2021-01-18 RX ORDER — FLUDEOXYGLUCOSE F 18 200 MCI/ML
15.8 INJECTION, SOLUTION INTRAVENOUS
Status: COMPLETED | OUTPATIENT
Start: 2021-01-18 | End: 2021-01-18

## 2021-01-18 RX ORDER — SODIUM CHLORIDE 0.9 % (FLUSH) 0.9 %
10 SYRINGE (ML) INJECTION PRN
Status: COMPLETED | OUTPATIENT
Start: 2021-01-18 | End: 2021-01-18

## 2021-01-18 RX ADMIN — FLUDEOXYGLUCOSE F 18 15.8 MILLICURIE: 200 INJECTION, SOLUTION INTRAVENOUS at 10:16

## 2021-01-18 RX ADMIN — SODIUM CHLORIDE, PRESERVATIVE FREE 10 ML: 5 INJECTION INTRAVENOUS at 10:16

## 2021-01-26 ENCOUNTER — HOSPITAL ENCOUNTER (OUTPATIENT)
Dept: PULMONOLOGY | Age: 51
Discharge: HOME | End: 2021-01-26
Payer: COMMERCIAL

## 2021-01-26 DIAGNOSIS — R91.8 LUNG MASS: ICD-10-CM

## 2021-01-26 LAB
DLCO %PRED: 99 %
DLCO PRED: NORMAL
DLCO/VA %PRED: NORMAL
DLCO/VA PRED: NORMAL
DLCO/VA: NORMAL
DLCO: NORMAL
EXPIRATORY TIME-POST: NORMAL
EXPIRATORY TIME: NORMAL
FEF 25-75% %CHNG: NORMAL
FEF 25-75% %PRED-POST: NORMAL
FEF 25-75% %PRED-PRE: NORMAL
FEF 25-75% PRED: NORMAL
FEF 25-75%-POST: NORMAL
FEF 25-75%-PRE: NORMAL
FEV1 %PRED-POST: 120 %
FEV1 %PRED-PRE: 120 %
FEV1 PRED: NORMAL
FEV1-POST: NORMAL
FEV1-PRE: NORMAL
FEV1/FVC %PRED-POST: NORMAL
FEV1/FVC %PRED-PRE: NORMAL
FEV1/FVC PRED: NORMAL
FEV1/FVC-POST: 87 %
FEV1/FVC-PRE: 84 %
FVC %PRED-POST: NORMAL
FVC %PRED-PRE: NORMAL
FVC PRED: NORMAL
FVC-POST: NORMAL
FVC-PRE: NORMAL
GAW %PRED: NORMAL
GAW PRED: NORMAL
GAW: NORMAL
IC %PRED: NORMAL
IC PRED: NORMAL
IC: NORMAL
MEP: NORMAL
MIP: NORMAL
MVV %PRED-PRE: NORMAL
MVV PRED: NORMAL
MVV-PRE: NORMAL
PEF %PRED-POST: NORMAL
PEF %PRED-PRE: NORMAL
PEF PRED: NORMAL
PEF%CHNG: NORMAL
PEF-POST: NORMAL
PEF-PRE: NORMAL
RAW %PRED: NORMAL
RAW PRED: NORMAL
RAW: NORMAL
RV %PRED: NORMAL
RV PRED: NORMAL
RV: NORMAL
SVC %PRED: NORMAL
SVC PRED: NORMAL
SVC: NORMAL
TLC %PRED: 103 %
TLC PRED: NORMAL
TLC: NORMAL
VA %PRED: NORMAL
VA PRED: NORMAL
VA: NORMAL
VTG %PRED: NORMAL
VTG PRED: NORMAL
VTG: NORMAL

## 2021-01-26 PROCEDURE — 94729 DIFFUSING CAPACITY: CPT

## 2021-01-26 PROCEDURE — 94060 EVALUATION OF WHEEZING: CPT

## 2021-01-26 PROCEDURE — 94727 GAS DIL/WSHOT DETER LNG VOL: CPT

## 2021-01-26 ASSESSMENT — PULMONARY FUNCTION TESTS
FEV1/FVC_PRE: 84
FEV1/FVC_POST: 87
FEV1_PERCENT_PREDICTED_POST: 120
FEV1_PERCENT_PREDICTED_PRE: 120

## 2021-01-27 ENCOUNTER — OFFICE VISIT (OUTPATIENT)
Dept: FAMILY MEDICINE CLINIC | Age: 51
End: 2021-01-27
Payer: COMMERCIAL

## 2021-01-27 VITALS
SYSTOLIC BLOOD PRESSURE: 132 MMHG | BODY MASS INDEX: 43.13 KG/M2 | TEMPERATURE: 97.3 F | OXYGEN SATURATION: 98 % | DIASTOLIC BLOOD PRESSURE: 76 MMHG | RESPIRATION RATE: 16 BRPM | HEART RATE: 92 BPM | WEIGHT: 263.2 LBS

## 2021-01-27 DIAGNOSIS — Z86.39 H/O HYPERTHYROIDISM: ICD-10-CM

## 2021-01-27 DIAGNOSIS — F41.9 ANXIETY: ICD-10-CM

## 2021-01-27 DIAGNOSIS — I10 ESSENTIAL HYPERTENSION: ICD-10-CM

## 2021-01-27 DIAGNOSIS — R73.03 PRE-DIABETES: ICD-10-CM

## 2021-01-27 DIAGNOSIS — R91.8 LUNG MASS: Primary | ICD-10-CM

## 2021-01-27 PROBLEM — R21 RASH: Status: RESOLVED | Noted: 2019-08-22 | Resolved: 2021-01-27

## 2021-01-27 PROCEDURE — 99214 OFFICE O/P EST MOD 30 MIN: CPT | Performed by: NURSE PRACTITIONER

## 2021-01-27 RX ORDER — BUSPIRONE HYDROCHLORIDE 5 MG/1
5 TABLET ORAL 2 TIMES DAILY
Qty: 60 TABLET | Refills: 1 | Status: SHIPPED | OUTPATIENT
Start: 2021-01-27 | End: 2021-02-26

## 2021-01-27 RX ORDER — ZINC GLUCONATE 50 MG
50 TABLET ORAL DAILY
COMMUNITY
End: 2022-04-11

## 2021-01-27 RX ORDER — VENLAFAXINE HYDROCHLORIDE 150 MG/1
150 CAPSULE, EXTENDED RELEASE ORAL DAILY
Qty: 90 CAPSULE | Refills: 1 | Status: SHIPPED | OUTPATIENT
Start: 2021-01-27 | End: 2021-07-29 | Stop reason: SDUPTHER

## 2021-01-27 ASSESSMENT — COLUMBIA-SUICIDE SEVERITY RATING SCALE - C-SSRS
1. WITHIN THE PAST MONTH, HAVE YOU WISHED YOU WERE DEAD OR WISHED YOU COULD GO TO SLEEP AND NOT WAKE UP?: NO
6. HAVE YOU EVER DONE ANYTHING, STARTED TO DO ANYTHING, OR PREPARED TO DO ANYTHING TO END YOUR LIFE?: NO

## 2021-01-27 ASSESSMENT — ENCOUNTER SYMPTOMS
CHEST TIGHTNESS: 0
DIARRHEA: 0
ABDOMINAL PAIN: 0
CONSTIPATION: 0
SHORTNESS OF BREATH: 0
WHEEZING: 0
COUGH: 0
NAUSEA: 0
VOMITING: 0

## 2021-01-27 ASSESSMENT — PATIENT HEALTH QUESTIONNAIRE - PHQ9
3. TROUBLE FALLING OR STAYING ASLEEP: 3
8. MOVING OR SPEAKING SO SLOWLY THAT OTHER PEOPLE COULD HAVE NOTICED. OR THE OPPOSITE, BEING SO FIGETY OR RESTLESS THAT YOU HAVE BEEN MOVING AROUND A LOT MORE THAN USUAL: 0
7. TROUBLE CONCENTRATING ON THINGS, SUCH AS READING THE NEWSPAPER OR WATCHING TELEVISION: 0
4. FEELING TIRED OR HAVING LITTLE ENERGY: 3
10. IF YOU CHECKED OFF ANY PROBLEMS, HOW DIFFICULT HAVE THESE PROBLEMS MADE IT FOR YOU TO DO YOUR WORK, TAKE CARE OF THINGS AT HOME, OR GET ALONG WITH OTHER PEOPLE: 1
SUM OF ALL RESPONSES TO PHQ9 QUESTIONS 1 & 2: 3
9. THOUGHTS THAT YOU WOULD BE BETTER OFF DEAD, OR OF HURTING YOURSELF: 0

## 2021-01-27 ASSESSMENT — ANXIETY QUESTIONNAIRES
4. TROUBLE RELAXING: 2-OVER HALF THE DAYS
5. BEING SO RESTLESS THAT IT IS HARD TO SIT STILL: 1-SEVERAL DAYS
6. BECOMING EASILY ANNOYED OR IRRITABLE: 1-SEVERAL DAYS

## 2021-01-27 NOTE — PROGRESS NOTES
SUBJECTIVE:    Verner Hefty  1970  44 y.o.  female      Chief Complaint   Patient presents with    Follow-up     patient is here for 6 month follow up      HPI      Allergies   Allergen Reactions    Pcn [Penicillins] Hives     Any derivative       Current Outpatient Medications   Medication Sig Dispense Refill    B Complex-C-Iron (SUPER B-COMPLEX/IRON/VITAMIN C PO) Take by mouth      Licorice, Glycyrrhiza glabra, (LICORICE PO) Take 566 mg by mouth      zinc gluconate 50 MG tablet Take 50 mg by mouth daily      venlafaxine (EFFEXOR XR) 150 MG extended release capsule Take 1 capsule by mouth daily 90 capsule 1    busPIRone (BUSPAR) 5 MG tablet Take 1 tablet by mouth 2 times daily 60 tablet 1    Ascorbic Acid (VITAMIN C PO) Take by mouth      TURMERIC PO Take by mouth      Lactobacillus (ACIDOPHILUS) 100 MG CAPS Take by mouth daily      Glucosamine-Chondroitin (GLUCOSAMINE CHONDR COMPLEX PO) Take by mouth daily      docusate sodium (COLACE) 100 MG capsule Take 100 mg by mouth 2 times daily      Cinnamon (EQL CINNAMON) 500 MG CAPS Take 1,200 mg by mouth daily      APPLE CIDER VINEGAR PO Take 450 mg by mouth daily      famotidine (PEPCID) 20 MG tablet Take 20 mg by mouth nightly      magnesium gluconate (MAGONATE) 500 MG tablet Take 250 mg by mouth nightly      Potassium 99 MG TABS Take by mouth nightly      ibuprofen (ADVIL;MOTRIN) 200 MG tablet Take 200 mg by mouth every 6 hours as needed for Pain      hydroCHLOROthiazide (HYDRODIURIL) 50 MG tablet Take 1 tablet by mouth daily 90 tablet 3     No current facility-administered medications for this visit.            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     Hypertension     PCP\"on medication for b/p - since age mid 39's\"    Hyperthyroidism \"\"in remission since \"    Lung nodule     Migraine     Last migraine: 2020    Type 2 diabetes mellitus without complication ([address]     Wears glasses      Past Surgical History:   Procedure Laterality Date    ARM SURGERY Right 2020    RIGHT ULNAR NERVE TRANSPOSITION performed by León Nava MD at [place] [address] Right 2020    RIGHT CARPAL TUNNEL RELEASE performed by León Nava MD at [place] [address] Left 10/12/2020    LEFT CARPAL TUNNEL RELEASE, LEFT GANGLION CYST EXCISION performed by León Nava MD at [facility]  2003    CYST REMOVAL      HAND SURGERY Right 2020    RIGHT GANGLION CYST HAND LESION BIOPSY EXCISION performed by León Nava MD at [facility]  2015    bilateral ovaries not removed   [address] N/A 2019    APPENDECTOMY LAPAROSCOPIC performed by Leonardo Mcnair MD at [address] [place] [address]      alner nerve release    TONSILLECTOMY       Social History     Socioeconomic History    Marital status:      Spouse name: None    Number of children: None    Years of education: None    Highest education level: None   Occupational History    None   Social Needs    Financial resource strain: None    Food insecurity     Worry: None     Inability: None    Transportation needs     Medical: None     Non-medical: None   Tobacco Use    Smoking status: Former Smoker     Packs/day: 1.00     Years: 20.00     Pack years: 20.00     Types: Cigarettes     Quit date:      Years since quittin.0    Smokeless tobacco: Never Used   Substance and Sexual Activity    Alcohol use: No    Drug use: No    Sexual activity: Yes     Partners: Male   Lifestyle    Physical activity     Days per week: None     Minutes per session: None    Stress: None   Relationships    Social connections     Talks on phone: None     Gets together: None Attends Baptism service: None     Active member of club or organization: None     Attends meetings of clubs or organizations: None     Relationship status: None    Intimate partner violence     Fear of current or ex partner: None     Emotionally abused: None     Physically abused: None     Forced sexual activity: None   Other Topics Concern    None   Social History Narrative    None         Pre-diabetes  Not checking blood sugar. No formal exercise    Essential hypertension  Elevated at last couple visits with other providers. However, she is under a lot of stress with new lung nodule diagnosis. Patient denies any exertional chest pain, dyspnea, palpitations, syncope, orthopnea, edema or paroxysmal nocturnal dyspnea. Anxiety  More anxious since lung nodule found. THERESE 7 SCORE 1/27/2021 7/29/2020 4/1/2020 9/25/2019 7/18/2019   THERESE-7 Total Score 9 4 11 8 11     Interpretation of THERESE-7 score: 5-9 = mild anxiety, 10-14 = moderate anxiety, 15+ = severe anxiety. Recommend referral to behavioral health for scores 10 or greater. Lung mass  Abdominal CT completed in November and found to have lung nodule. 12 mm in size and close to her diaphragm. She was evaluated by hematology and referred for IR biopsy. However, biopsy not able to be completed due to location to the diaphragm. She was referred to Dr. Francy Carl with plan for surgical resection. She had PFTs and PET scan completed and follows up with Dr. Francy Carl tomorrow to discuss results. Review of Systems   Constitutional: Negative for appetite change, chills, fatigue and unexpected weight change. Respiratory: Negative for cough, chest tightness, shortness of breath and wheezing. Cardiovascular: Negative for chest pain, palpitations and leg swelling. Gastrointestinal: Negative for abdominal pain, constipation, diarrhea, nausea and vomiting. Musculoskeletal: Negative for arthralgias. Neurological: Negative for weakness, numbness and headaches. Hematological: Negative for adenopathy. Psychiatric/Behavioral: Positive for dysphoric mood. The patient is nervous/anxious. OBJECTIVE:    /76   Pulse 92   Temp 97.3 °F (36.3 °C) (Infrared)   Resp 16   Wt 263 lb 3.2 oz (119.4 kg)   SpO2 98%   BMI 43.13 kg/m²     Physical Exam  Constitutional:       Appearance: Normal appearance. She is well-developed. HENT:      Head: Normocephalic and atraumatic. Right Ear: Hearing normal.      Left Ear: Hearing normal.   Neck:      Musculoskeletal: Normal range of motion and neck supple. Vascular: No carotid bruit or JVD. Cardiovascular:      Rate and Rhythm: Normal rate and regular rhythm. Heart sounds: Normal heart sounds. No murmur. No friction rub. No gallop. Pulmonary:      Effort: Pulmonary effort is normal. No respiratory distress. Breath sounds: Normal breath sounds. No wheezing, rhonchi or rales. Abdominal:      Palpations: Abdomen is soft. Musculoskeletal: Normal range of motion. Skin:     General: Skin is warm and dry. Neurological:      Mental Status: She is alert and oriented to person, place, and time. Psychiatric:         Mood and Affect: Mood is anxious and depressed. Speech: Speech normal.         Behavior: Behavior normal.         Thought Content: Thought content normal. Thought content does not include homicidal or suicidal ideation. Thought content does not include homicidal or suicidal plan. ASSESSMENT/PLAN:    1. Lung mass  Follow up with Dr. Johanna Campbell tomorrow    2. Anxiety  Start buspar BID. effexor increased. Discussed counseling  - busPIRone (BUSPAR) 5 MG tablet; Take 1 tablet by mouth 2 times daily  Dispense: 60 tablet; Refill: 1    3. Pre-diabetes  Monitor glucose. The patient is asked to make an attempt to improve diet and exercise patterns to aid in medical management of this problem. - Lipid Panel;  Future - HEMOGLOBIN A1C; Future    4. Essential hypertension  Monitor BP at home. Labs as ordered  - CBC Auto Differential; Future  - Comprehensive Metabolic Panel; Future    5. H/O hyperthyroidism  recheck  - TSH with Reflex; Future               Return in about 4 weeks (around 2/24/2021).       (Please note that portions of this note may have been completed with a voice recognition program. Efforts were made to edit the dictations but occasionally words aremis-transcribed.)

## 2021-01-28 NOTE — ASSESSMENT & PLAN NOTE
Elevated at last couple visits with other providers. However, she is under a lot of stress with new lung nodule diagnosis. Patient denies any exertional chest pain, dyspnea, palpitations, syncope, orthopnea, edema or paroxysmal nocturnal dyspnea.

## 2021-01-28 NOTE — ASSESSMENT & PLAN NOTE
Abdominal CT completed in November and found to have lung nodule. 12 mm in size and close to her diaphragm. She was evaluated by hematology and referred for IR biopsy. However, biopsy not able to be completed due to location to the diaphragm. She was referred to Dr. Socorro Barreto with plan for surgical resection. She had PFTs and PET scan completed and follows up with Dr. Socorro Barreto tomorrow to discuss results.

## 2021-01-28 NOTE — ASSESSMENT & PLAN NOTE
More anxious since lung nodule found. THERESE 7 SCORE 1/27/2021 7/29/2020 4/1/2020 9/25/2019 7/18/2019   THERESE-7 Total Score 9 4 11 8 11     Interpretation of THERESE-7 score: 5-9 = mild anxiety, 10-14 = moderate anxiety, 15+ = severe anxiety. Recommend referral to behavioral health for scores 10 or greater.

## 2021-01-29 ENCOUNTER — HOSPITAL ENCOUNTER (OUTPATIENT)
Dept: CT IMAGING | Age: 51
Discharge: HOME | End: 2021-01-29
Payer: COMMERCIAL

## 2021-01-29 DIAGNOSIS — R07.9 CHEST PAIN, UNSPECIFIED TYPE: ICD-10-CM

## 2021-01-29 DIAGNOSIS — R91.1 LUNG NODULE, SOLITARY: ICD-10-CM

## 2021-01-29 PROCEDURE — 6360000004 HC RX CONTRAST MEDICATION: Performed by: INTERNAL MEDICINE

## 2021-01-29 PROCEDURE — 71260 CT THORAX DX C+: CPT

## 2021-01-29 RX ADMIN — IOPAMIDOL 100 ML: 755 INJECTION, SOLUTION INTRAVENOUS at 16:14

## 2021-02-02 PROBLEM — Z87.891 HISTORY OF TOBACCO USE IN HOME: Status: ACTIVE | Noted: 2021-02-02

## 2021-02-08 DIAGNOSIS — Z86.39 H/O HYPERTHYROIDISM: ICD-10-CM

## 2021-02-08 DIAGNOSIS — R74.8 ELEVATED ALKALINE PHOSPHATASE LEVEL: ICD-10-CM

## 2021-02-08 DIAGNOSIS — I10 ESSENTIAL HYPERTENSION: ICD-10-CM

## 2021-02-08 DIAGNOSIS — R79.9 ELEVATED BUN: Primary | ICD-10-CM

## 2021-02-08 DIAGNOSIS — R73.03 PRE-DIABETES: ICD-10-CM

## 2021-02-08 LAB
A/G RATIO: 1.4 (ref 1.1–2.2)
ALBUMIN SERPL-MCNC: 4.1 G/DL (ref 3.4–5)
ALP BLD-CCNC: 158 U/L (ref 40–129)
ALT SERPL-CCNC: 23 U/L (ref 10–40)
ANION GAP SERPL CALCULATED.3IONS-SCNC: 10 MMOL/L (ref 3–16)
AST SERPL-CCNC: 21 U/L (ref 15–37)
BASOPHILS ABSOLUTE: 0.1 K/UL (ref 0–0.2)
BASOPHILS RELATIVE PERCENT: 0.6 %
BILIRUB SERPL-MCNC: <0.2 MG/DL (ref 0–1)
BUN BLDV-MCNC: 25 MG/DL (ref 7–20)
CALCIUM SERPL-MCNC: 10 MG/DL (ref 8.3–10.6)
CHLORIDE BLD-SCNC: 99 MMOL/L (ref 99–110)
CHOLESTEROL, TOTAL: 215 MG/DL (ref 0–199)
CO2: 32 MMOL/L (ref 21–32)
CREAT SERPL-MCNC: 0.7 MG/DL (ref 0.6–1.1)
EOSINOPHILS ABSOLUTE: 0.7 K/UL (ref 0–0.6)
EOSINOPHILS RELATIVE PERCENT: 5.4 %
GFR AFRICAN AMERICAN: >60
GFR NON-AFRICAN AMERICAN: >60
GLOBULIN: 3 G/DL
GLUCOSE BLD-MCNC: 108 MG/DL (ref 70–99)
HCT VFR BLD CALC: 41.4 % (ref 36–48)
HDLC SERPL-MCNC: 46 MG/DL (ref 40–60)
HEMOGLOBIN: 13.7 G/DL (ref 12–16)
LDL CHOLESTEROL CALCULATED: 120 MG/DL
LYMPHOCYTES ABSOLUTE: 2.9 K/UL (ref 1–5.1)
LYMPHOCYTES RELATIVE PERCENT: 23.6 %
MCH RBC QN AUTO: 28.5 PG (ref 26–34)
MCHC RBC AUTO-ENTMCNC: 33.2 G/DL (ref 31–36)
MCV RBC AUTO: 86 FL (ref 80–100)
MONOCYTES ABSOLUTE: 0.9 K/UL (ref 0–1.3)
MONOCYTES RELATIVE PERCENT: 7.4 %
NEUTROPHILS ABSOLUTE: 7.6 K/UL (ref 1.7–7.7)
NEUTROPHILS RELATIVE PERCENT: 63 %
PDW BLD-RTO: 13.5 % (ref 12.4–15.4)
PLATELET # BLD: 287 K/UL (ref 135–450)
PMV BLD AUTO: 8.4 FL (ref 5–10.5)
POTASSIUM SERPL-SCNC: 4.6 MMOL/L (ref 3.5–5.1)
RBC # BLD: 4.82 M/UL (ref 4–5.2)
SODIUM BLD-SCNC: 141 MMOL/L (ref 136–145)
TOTAL PROTEIN: 7.1 G/DL (ref 6.4–8.2)
TRIGL SERPL-MCNC: 247 MG/DL (ref 0–150)
TSH REFLEX: 1.46 UIU/ML (ref 0.27–4.2)
VLDLC SERPL CALC-MCNC: 49 MG/DL
WBC # BLD: 12.1 K/UL (ref 4–11)

## 2021-02-09 DIAGNOSIS — R79.9 ELEVATED BUN: ICD-10-CM

## 2021-02-09 PROBLEM — E11.9 TYPE 2 DIABETES MELLITUS WITHOUT COMPLICATION, WITHOUT LONG-TERM CURRENT USE OF INSULIN (HCC): Status: ACTIVE | Noted: 2019-04-18

## 2021-02-09 LAB
ESTIMATED AVERAGE GLUCOSE: 139.9 MG/DL
HBA1C MFR BLD: 6.5 %

## 2021-02-10 DIAGNOSIS — E11.9 TYPE 2 DIABETES MELLITUS WITHOUT COMPLICATION, WITHOUT LONG-TERM CURRENT USE OF INSULIN (HCC): Primary | ICD-10-CM

## 2021-02-11 LAB
ALK PHOS OTHER CALC: 0 U/L
ALK PHOSPHATASE: 150 U/L (ref 40–120)
ALKALINE PHOSPHATASE BONE FRACTION: 62 U/L (ref 0–55)
ALKALINE PHOSPHATASE LIVER FRACTION: 89 U/L (ref 0–94)

## 2021-03-03 ENCOUNTER — OFFICE VISIT (OUTPATIENT)
Dept: FAMILY MEDICINE CLINIC | Age: 51
End: 2021-03-03
Payer: COMMERCIAL

## 2021-03-03 VITALS
BODY MASS INDEX: 42.36 KG/M2 | SYSTOLIC BLOOD PRESSURE: 124 MMHG | HEART RATE: 93 BPM | OXYGEN SATURATION: 97 % | WEIGHT: 263.6 LBS | TEMPERATURE: 97.3 F | HEIGHT: 66 IN | DIASTOLIC BLOOD PRESSURE: 70 MMHG

## 2021-03-03 DIAGNOSIS — R91.8 LUNG MASS: ICD-10-CM

## 2021-03-03 DIAGNOSIS — F41.9 ANXIETY: Primary | ICD-10-CM

## 2021-03-03 DIAGNOSIS — E11.9 TYPE 2 DIABETES MELLITUS WITHOUT COMPLICATION, WITHOUT LONG-TERM CURRENT USE OF INSULIN (HCC): ICD-10-CM

## 2021-03-03 DIAGNOSIS — I10 ESSENTIAL HYPERTENSION: ICD-10-CM

## 2021-03-03 LAB
CREATININE URINE POCT: NORMAL
MICROALBUMIN/CREAT 24H UR: NORMAL MG/G{CREAT}
MICROALBUMIN/CREAT UR-RTO: NORMAL

## 2021-03-03 PROCEDURE — 99213 OFFICE O/P EST LOW 20 MIN: CPT | Performed by: NURSE PRACTITIONER

## 2021-03-03 PROCEDURE — 82044 UR ALBUMIN SEMIQUANTITATIVE: CPT | Performed by: NURSE PRACTITIONER

## 2021-03-03 RX ORDER — BUSPIRONE HYDROCHLORIDE 5 MG/1
5 TABLET ORAL 2 TIMES DAILY
Qty: 180 TABLET | Refills: 1 | Status: SHIPPED | OUTPATIENT
Start: 2021-03-03 | End: 2021-07-29 | Stop reason: SDUPTHER

## 2021-03-03 RX ORDER — BUSPIRONE HYDROCHLORIDE 5 MG/1
5 TABLET ORAL 2 TIMES DAILY
COMMUNITY
End: 2021-03-03 | Stop reason: SDUPTHER

## 2021-03-03 ASSESSMENT — ENCOUNTER SYMPTOMS
WHEEZING: 0
CONSTIPATION: 0
SHORTNESS OF BREATH: 0
CHEST TIGHTNESS: 0
NAUSEA: 0
DIARRHEA: 0
COUGH: 0
VOMITING: 0
ABDOMINAL PAIN: 0

## 2021-03-03 ASSESSMENT — PATIENT HEALTH QUESTIONNAIRE - PHQ9
1. LITTLE INTEREST OR PLEASURE IN DOING THINGS: 1
SUM OF ALL RESPONSES TO PHQ9 QUESTIONS 1 & 2: 1

## 2021-03-03 NOTE — ASSESSMENT & PLAN NOTE
PET scan completed. Nodule did not light up. Dr. Camilla Garcia recommended monitoring with repeat CT in three months. Plan for surgery if any growth.

## 2021-03-03 NOTE — ASSESSMENT & PLAN NOTE
She feels anxiety has improved with buspar. She is not sleeping well, but admits to going to bed around midnight and then has to be up at 5.

## 2021-03-03 NOTE — ASSESSMENT & PLAN NOTE
Not checking blood sugar. Very active at work, but no formal exercise. Diet for breakfast and lunch is okay. States as soon as home from work looking for [employer].  Eye exam per Dr. Kan Service

## 2021-03-03 NOTE — PROGRESS NOTES
SUBJECTIVE:    Fady Dykes  1970  44 y.o.  female      Chief Complaint   Patient presents with    Follow-up     4 wk f/u     HPI     Allergies   Allergen Reactions    Pcn [Penicillins] Hives     Any derivative       Current Outpatient Medications   Medication Sig Dispense Refill    busPIRone (BUSPAR) 5 MG tablet Take 1 tablet by mouth 2 times daily 180 tablet 1    metFORMIN (GLUCOPHAGE) 500 MG tablet Take 1 tablet by mouth daily (with breakfast) 90 tablet 1    B Complex-C-Iron (SUPER B-COMPLEX/IRON/VITAMIN C PO) Take by mouth      Licorice, Glycyrrhiza glabra, (LICORICE PO) Take 128 mg by mouth      zinc gluconate 50 MG tablet Take 50 mg by mouth daily      venlafaxine (EFFEXOR XR) 150 MG extended release capsule Take 1 capsule by mouth daily 90 capsule 1    Ascorbic Acid (VITAMIN C PO) Take by mouth      TURMERIC PO Take by mouth      Lactobacillus (ACIDOPHILUS) 100 MG CAPS Take by mouth daily      Glucosamine-Chondroitin (GLUCOSAMINE CHONDR COMPLEX PO) Take by mouth daily      docusate sodium (COLACE) 100 MG capsule Take 100 mg by mouth 2 times daily      Cinnamon (EQL CINNAMON) 500 MG CAPS Take 1,200 mg by mouth daily      APPLE CIDER VINEGAR PO Take 450 mg by mouth daily      famotidine (PEPCID) 20 MG tablet Take 20 mg by mouth nightly      magnesium gluconate (MAGONATE) 500 MG tablet Take 250 mg by mouth nightly      Potassium 99 MG TABS Take by mouth nightly      ibuprofen (ADVIL;MOTRIN) 200 MG tablet Take 200 mg by mouth every 6 hours as needed for Pain      hydroCHLOROthiazide (HYDRODIURIL) 50 MG tablet Take 1 tablet by mouth daily 90 tablet 3     No current facility-administered medications for this visit.            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 Book disease     Hypertension PCP\"on medication for b/p - since age mid 37's\"    Hyperthyroidism     \"\"in remission since \"    Lung nodule     Migraine     Last migraine: 2020    Type 2 diabetes mellitus without complication ([address]     Wears glasses      Past Surgical History:   Procedure Laterality Date    ARM SURGERY Right 2020    RIGHT ULNAR NERVE TRANSPOSITION performed by Orquidea Tyler MD at [place] [address] Right 2020    RIGHT CARPAL TUNNEL RELEASE performed by Orquidea Tyler MD at [place] [address] Left 10/12/2020    LEFT CARPAL TUNNEL RELEASE, LEFT GANGLION CYST EXCISION performed by Orquidea Tyler MD at [facility]  2003    CYST REMOVAL      HAND SURGERY Right 2020    RIGHT GANGLION CYST HAND LESION BIOPSY EXCISION performed by Orquidea Tyler MD at [address] [place] [address]  2015    bilateral ovaries not removed   [address] N/A 2019    APPENDECTOMY LAPAROSCOPIC performed by Suzy Mayfield MD at [address] [place] [address]      alner nerve release    TONSILLECTOMY       Social History     Socioeconomic History    Marital status:      Spouse name: None    Number of children: None    Years of education: None    Highest education level: None   Occupational History    None   Social Needs    Financial resource strain: None    Food insecurity     Worry: None     Inability: None    Transportation needs     Medical: None     Non-medical: None   Tobacco Use    Smoking status: Former Smoker     Packs/day: 1.00     Years: 20.00     Pack years: 20.00     Types: Cigarettes     Quit date:      Years since quittin.1    Smokeless tobacco: Never Used   Substance and Sexual Activity    Alcohol use: No    Drug use: No    Sexual activity: Yes     Partners: Male   Lifestyle    Physical activity     Days per week: None     Minutes per session: None    Stress: None   Relationships  Social connections     Talks on phone: None     Gets together: None     Attends Scientologist service: None     Active member of club or organization: None     Attends meetings of clubs or organizations: None     Relationship status: None    Intimate partner violence     Fear of current or ex partner: None     Emotionally abused: None     Physically abused: None     Forced sexual activity: None   Other Topics Concern    None   Social History Narrative    None         Type 2 diabetes mellitus without complication, without long-term current use of insulin ([address]  Not checking blood sugar. Very active at work, but no formal exercise. Diet for breakfast and lunch is okay. States as soon as home from work looking for [employer]. Eye exam per Dr. Astrid Tello    Lung mass  PET scan completed. Nodule did not light up. Dr. Camilla Garcia recommended monitoring with repeat CT in three months. Plan for surgery if any growth. Essential hypertension  Improved. Patient denies any exertional chest pain, dyspnea, palpitations, syncope, orthopnea, edema or paroxysmal nocturnal dyspnea. Anxiety  She feels anxiety has improved with buspar. She is not sleeping well, but admits to going to bed around midnight and then has to be up at 5. Review of Systems   Constitutional: Negative for appetite change, chills, fatigue and unexpected weight change. Respiratory: Negative for cough, chest tightness, shortness of breath and wheezing. Cardiovascular: Negative for chest pain, palpitations and leg swelling. Gastrointestinal: Negative for abdominal pain, constipation, diarrhea, nausea and vomiting. Musculoskeletal: Negative for arthralgias. Neurological: Negative for weakness, numbness and headaches. Hematological: Negative for adenopathy. Psychiatric/Behavioral: Positive for sleep disturbance. Negative for dysphoric mood and suicidal ideas. The patient is not nervous/anxious.         OBJECTIVE: /70   Pulse 93   Temp 97.3 °F (36.3 °C) (Temporal)   Ht 5' 5.5\" (1.664 m)   Wt 263 lb 9.6 oz (119.6 kg)   SpO2 97%   BMI 43.20 kg/m²     Physical Exam  Constitutional:       Appearance: Normal appearance. She is well-developed. HENT:      Head: Normocephalic and atraumatic. Right Ear: Hearing normal.      Left Ear: Hearing normal.   Neck:      Musculoskeletal: Normal range of motion and neck supple. Vascular: No carotid bruit or JVD. Cardiovascular:      Rate and Rhythm: Normal rate and regular rhythm. Pulses:           Dorsalis pedis pulses are 2+ on the right side and 2+ on the left side. Posterior tibial pulses are 2+ on the right side and 2+ on the left side. Heart sounds: Normal heart sounds. No murmur. No friction rub. No gallop. Pulmonary:      Effort: Pulmonary effort is normal. No respiratory distress. Breath sounds: Normal breath sounds. No wheezing, rhonchi or rales. Abdominal:      Palpations: Abdomen is soft. Musculoskeletal: Normal range of motion. Feet:      Right foot:      Protective Sensation: 10 sites tested. 10 sites sensed. Skin integrity: Callus present. Left foot:      Protective Sensation: 10 sites tested. 10 sites sensed. Skin:     General: Skin is warm and dry. Neurological:      General: No focal deficit present. Mental Status: She is alert and oriented to person, place, and time. Psychiatric:         Mood and Affect: Mood and affect normal.         Behavior: Behavior normal. Behavior is cooperative. Thought Content: Thought content normal.         ASSESSMENT/PLAN:    1. Type 2 diabetes mellitus without complication, without long-term current use of insulin ([facility])  Monitor glucose. The patient is asked to make an attempt to improve diet and exercise patterns to aid in medical management of this problem. - POCT microalbumin  - HM DIABETES FOOT EXAM    2.  Anxiety Continue buspar. Earlier bedtime. Sleep hygiene    3. Lung mass  Complete CT as scheduled. 4. Essential hypertension  DASH diet, weight loss           Return in about 3 months (around 6/3/2021).       (Please note that portions of this note may have been completed with a voice recognition program. Efforts were made to edit the dictations but occasionally words aremis-transcribed.)

## 2021-03-03 NOTE — ASSESSMENT & PLAN NOTE
Improved. Patient denies any exertional chest pain, dyspnea, palpitations, syncope, orthopnea, edema or paroxysmal nocturnal dyspnea.

## 2021-04-08 ENCOUNTER — APPOINTMENT (OUTPATIENT)
Dept: CT IMAGING | Age: 51
End: 2021-04-08
Payer: COMMERCIAL

## 2021-04-08 ENCOUNTER — HOSPITAL ENCOUNTER (EMERGENCY)
Age: 51
Discharge: HOME | End: 2021-04-08
Attending: EMERGENCY MEDICINE
Payer: COMMERCIAL

## 2021-04-08 ENCOUNTER — NURSE TRIAGE (OUTPATIENT)
Dept: OTHER | Facility: CLINIC | Age: 51
End: 2021-04-08

## 2021-04-08 ENCOUNTER — TELEPHONE (OUTPATIENT)
Dept: FAMILY MEDICINE CLINIC | Age: 51
End: 2021-04-08

## 2021-04-08 VITALS
SYSTOLIC BLOOD PRESSURE: 153 MMHG | DIASTOLIC BLOOD PRESSURE: 87 MMHG | HEART RATE: 68 BPM | RESPIRATION RATE: 16 BRPM | HEIGHT: 66 IN | OXYGEN SATURATION: 100 % | WEIGHT: 260 LBS | BODY MASS INDEX: 41.78 KG/M2 | TEMPERATURE: 98.1 F

## 2021-04-08 DIAGNOSIS — M54.6 ACUTE MIDLINE THORACIC BACK PAIN: Primary | ICD-10-CM

## 2021-04-08 PROCEDURE — 72128 CT CHEST SPINE W/O DYE: CPT

## 2021-04-08 PROCEDURE — 6370000000 HC RX 637 (ALT 250 FOR IP): Performed by: EMERGENCY MEDICINE

## 2021-04-08 PROCEDURE — 72131 CT LUMBAR SPINE W/O DYE: CPT

## 2021-04-08 PROCEDURE — 96372 THER/PROPH/DIAG INJ SC/IM: CPT

## 2021-04-08 PROCEDURE — 99283 EMERGENCY DEPT VISIT LOW MDM: CPT

## 2021-04-08 PROCEDURE — 6360000002 HC RX W HCPCS: Performed by: EMERGENCY MEDICINE

## 2021-04-08 RX ORDER — METHOCARBAMOL 500 MG/1
500 TABLET, FILM COATED ORAL 3 TIMES DAILY PRN
Qty: 21 TABLET | Refills: 0 | Status: SHIPPED | OUTPATIENT
Start: 2021-04-08 | End: 2021-04-15

## 2021-04-08 RX ORDER — METHOCARBAMOL 500 MG/1
1000 TABLET, FILM COATED ORAL ONCE
Status: COMPLETED | OUTPATIENT
Start: 2021-04-08 | End: 2021-04-08

## 2021-04-08 RX ORDER — NAPROXEN 500 MG/1
500 TABLET ORAL 2 TIMES DAILY PRN
Qty: 14 TABLET | Refills: 0 | Status: SHIPPED | OUTPATIENT
Start: 2021-04-08 | End: 2022-04-11

## 2021-04-08 RX ORDER — HYDROCODONE BITARTRATE AND ACETAMINOPHEN 5; 325 MG/1; MG/1
1 TABLET ORAL ONCE
Status: COMPLETED | OUTPATIENT
Start: 2021-04-08 | End: 2021-04-08

## 2021-04-08 RX ORDER — LIDOCAINE 4 G/G
1 PATCH TOPICAL DAILY
Qty: 30 PATCH | Refills: 0 | Status: SHIPPED | OUTPATIENT
Start: 2021-04-08 | End: 2021-05-08

## 2021-04-08 RX ORDER — KETOROLAC TROMETHAMINE 30 MG/ML
15 INJECTION, SOLUTION INTRAMUSCULAR; INTRAVENOUS ONCE
Status: COMPLETED | OUTPATIENT
Start: 2021-04-08 | End: 2021-04-08

## 2021-04-08 RX ADMIN — KETOROLAC TROMETHAMINE 15 MG: 30 INJECTION, SOLUTION INTRAMUSCULAR; INTRAVENOUS at 21:29

## 2021-04-08 RX ADMIN — HYDROCODONE BITARTRATE AND ACETAMINOPHEN 1 TABLET: 5; 325 TABLET ORAL at 20:00

## 2021-04-08 RX ADMIN — METHOCARBAMOL TABLETS 1000 MG: 500 TABLET, COATED ORAL at 20:00

## 2021-04-08 ASSESSMENT — PAIN SCALES - GENERAL: PAINLEVEL_OUTOF10: 10

## 2021-04-08 NOTE — TELEPHONE ENCOUNTER
Pt called stating that she is stuck in a chair, and is currently icing it. Pt stated that she is wondering if it is a ruptured disc in the back. Pt is asking if you could please order an back xray so she can get it completed, or US\MRI. Please advise.

## 2021-04-08 NOTE — TELEPHONE ENCOUNTER
Spoke with patient and is on her way to ED. She had called nurse triage line who recommended she go to ED. Patient having back pain mostly in middle of back that feels like a spasm. Unable to move during spasms. She does have a history of disc rupture in lower back. Denies lower back pain, but has had loss of bladder control. She feels this is more than previous urge incontinence. Discussed the need for further evaluation due to loss of bladder control and she verbalized understanding.

## 2021-04-08 NOTE — TELEPHONE ENCOUNTER
Pt lvm at our office stating that she is having back pain. Pt said she had a laminectomy when she ws 25. She said it is in the middle of her back toward the left side. She said that she is having trouble getting out of the chair and would like to know if PCP can order test to show any soft tissue damage or any other testing? Please advise.

## 2021-04-08 NOTE — ED PROVIDER NOTES
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 female who presents midline lower thoracic back pain for the last 5 weeks. It is moderate to severe in intensity. Does not radiate elsewhere. The pain worsens with movement and not alleviated with any medications that the patient tried at home. Denies fever, IVDA, saddle anesthesia, lower extremity weakness or loss of sensation/paresthesias, fecal and urinary incontinence, recent trauma, and recent instrumentation of back. Of note, the patient reports that she is currently being evaluated for a lung nodule which may be suspicious for lung cancer. [place] any actual known history of cancer however. She is a former smoker and did smoke for over 20 years. Denies any other associated symptoms or complaints or concerns.       REVIEW OF SYSTEMS    Constitutional: negative for fever, chills  Neurological: negative for HA, focal weakness, loss of sensation  Ophthalmic: negative for vision change  ENT: negative for congestion, rhinorrhea  Cardiovascular: negative for chest pain  Respiratory: negative for SOB, cough  GI: negative for abdominal pain, nausea, vomiting, diarrhea, constipation  : negative for dysuria, hematuria  Musculoskeletal: negative for decreased ROM, joint swelling  Dermatological: negative for rash, wounds  Heme: Negative for bleeding, bruising      PAST MEDICAL HISTORY  Past Medical History:   Diagnosis Date    Acid reflux     Anxiety     Depression     Diabetes mellitus ([facility]     Pre-diabetic - diet controlled - follows with PCP    Elevated WBC count     Dr. Nayana Shetty disease  Hypertension     PCP\"on medication for b/p - since age mid 37's\"    Hyperthyroidism     \"\"in remission since 2003\"    Lung nodule     Migraine     Last migraine: 8/2020    Type 2 diabetes mellitus without complication ([address]     Wears glasses        CURRENT MEDICATIONS  [unfilled]    ALLERGIES  Allergies   Allergen Reactions    Pcn [Penicillins] Hives     Any derivative       SURGICAL HISTORY  Past Surgical History:   Procedure Laterality Date    ARM SURGERY Right 9/9/2020    RIGHT ULNAR NERVE TRANSPOSITION performed by Michelle Castellon MD at [place] [address] Right 9/9/2020    RIGHT CARPAL TUNNEL RELEASE performed by Michelle Castellon MD at [place] [address] Left 10/12/2020    LEFT CARPAL TUNNEL RELEASE, LEFT GANGLION CYST EXCISION performed by Michelle Castellon MD at [facility]  2003    CYST REMOVAL      HAND SURGERY Right 9/9/2020    RIGHT GANGLION CYST HAND LESION BIOPSY EXCISION performed by Michelle Castellon MD at [address] [place] [address]  6/1/2015    bilateral ovaries not removed   [address] N/A 5/31/2019    APPENDECTOMY LAPAROSCOPIC performed by Mayra Elliott MD at [address] [place] [address]      alner nerve release    TONSILLECTOMY  1990s       FAMILY HISTORY  Family History   Problem Relation Age of Onset    Other Mother         thyroid issues    High Blood Pressure Mother     High Cholesterol Mother     Heart Disease Father     Coronary Art Dis Father     High Cholesterol Father     Other Father         Osteoarthritis    Other Maternal Grandmother         brain shunt; hydrocephalus    Heart Attack Maternal Grandfather     Alzheimer's Disease Paternal Grandmother     Heart Attack Paternal Grandfather     Stroke Paternal Grandfather     High Cholesterol Sister     Asthma Sister        SOCIAL HISTORY  Social History     Socioeconomic History    Marital status:      Spouse name: None    Number of children: None    Years of education: None    Highest education level: None   Occupational History    None   Social Needs    Financial resource strain: None    Food insecurity     Worry: None     Inability: None    Transportation needs     Medical: None     Non-medical: None   Tobacco Use    Smoking status: Former Smoker     Packs/day: 1.00     Years: 20.00     Pack years: 20.00     Types: Cigarettes     Quit date:      Years since quittin.2    Smokeless tobacco: Never Used   Substance and Sexual Activity    Alcohol use: No    Drug use: No    Sexual activity: Yes     Partners: Male   Lifestyle    Physical activity     Days per week: None     Minutes per session: None    Stress: None   Relationships    Social connections     Talks on phone: None     Gets together: None     Attends Evangelical service: None     Active member of club or organization: None     Attends meetings of clubs or organizations: None     Relationship status: None    Intimate partner violence     Fear of current or ex partner: None     Emotionally abused: None     Physically abused: None     Forced sexual activity: None   Other Topics Concern    None   Social History Narrative    None         **Past medical, family and social histories, and nursing notes reviewed and verified by me**      PHYSICAL EXAM  VITAL SIGNS:   ED Triage Vitals [21 1640]   Enc Vitals Group      BP (!) 153/87      Pulse 68      Resp 16      Temp 98.1 °F (36.7 °C)      Temp Source Oral      SpO2 100 %      Weight 260 lb (117.9 kg)      Height 5' 5.5\" (1.664 m)      Head Circumference       Peak Flow       Pain Score       Pain Loc       Pain Edu? Excl. in [address]? Vitals during ED course were reviewed and are as charted.     Constitutional: Minimal distress, Non-toxic appearance  Eyes: Conjunctiva normal, No discharge  HENT: Normocephalic, Atraumatic, bilateral external ears normal, oropharynx moist  Neck: Supple, no midline cervical spinal tenderness, no stridor, no grossly visible or palpable masses  Cardiovascular: Regular rate and rhythm, No murmurs, No rubs, No gallops  Pulmonary/Chest: Normal breath sounds, No respiratory distress or accessory muscle use, No wheezing, crackles or rhonchi. Abdomen: Soft, nondistended and nonrigid, No tenderness or peritoneal signs, No masses, normal bowel sounds  Back: Left thoracic paraspinal muscle tenderness palpation without palpable deformities, otherwise no midline point tenderness, No paraspinous muscle tenderness elsewhere. No CVA tenderness  Extremities: No gross deformities, no edema, no tenderness  Neurologic: Normal motor function with symmetrical 5 out of 5 strength bilaterally in all extremities, Normal sensory function to light touch and pinprick, No focal deficits, 2+ DTRs  Skin: Warm, Dry, No erythema, No rash, No cyanosis, No mottling  Lymphatic: No lymphadenopathy in the following location(s): cervical  Psychiatric: Alert and oriented x3, Affect normal            RADIOLOGY/PROCEDURES/LABS/MEDICATIONS ADMINISTERED:    I have reviewed and interpreted all of the currently available lab results from this visit (if applicable):  No results found for this visit on 04/08/21. ABNORMAL LABS:  Labs Reviewed - No data to display      IMAGING STUDIES ORDERED:  CT THORACIC SPINE WO CONTRAST  CT LUMBAR SPINE WO CONTRAST      CT THORACIC SPINE WO CONTRAST   Final Result   Thoracic spine: No acute abnormality detected to explain midthoracic spine   pain. Lumbar spine: No acute abnormality identified. Degenerative disease, greatest L3-L4 and L4-L5. CT LUMBAR SPINE WO CONTRAST   Final Result   Thoracic spine: No acute abnormality detected to explain midthoracic spine   pain. Lumbar spine: No acute abnormality identified. Degenerative disease, greatest L3-L4 and L4-L5.                MEDICATIONS ADMINISTERED:  Medications   HYDROcodone-acetaminophen (NORCO) 5-325 MG per tablet 1 tablet (has no administration in time range)   ketorolac (TORADOL) injection 15 mg (has no administration in time range)   methocarbamol (ROBAXIN) tablet 1,000 mg (has no administration in time range)         COURSE & MEDICAL DECISION MAKING  Last vitals: BP (!) 153/87   Pulse 68   Temp 98.1 °F (36.7 °C) (Oral)   Resp 16   Ht 5' 5.5\" (1.664 m)   Wt 260 lb (117.9 kg)   SpO2 100%   BMI 42.61 kg/m²     Patient presented with back pain. Likely muscular. I completed a structured, evidence-based clinical evaluation to screen for acute non-traumatic spinal emergencies. Differential diagnoses included, but were not limited to, cauda equina syndrome, severe vertebral disc protrusion/extrusion/rupture with spinal cord compression, spinal abscess/osteomyelitis/discitis, and an acute intra-abdominal, intra-pelvic or retroperitoneal process, including, but not limited to AAA, aortic dissection or other vascular emergency. At this time, this patient exhibits no clinical or historical evidence to suggest or imply these potential causes of back pain. No clinical indication to obtain emergent imaging at this time. Patient was given the above medications with improvement in symptoms. Additional workup and treatment in the ED as documented above. I do believe the patient is a good candidate for outpatient symptomatic treatment. The patient was instructed on this treatment and the course that this type of back pain typically follows. The patient was also educated on back care tips and exercises. I also discussed worrisome symptoms to monitor for at home including, but not limited to, numbness or weakness in the lower extremities, bowel or bladder dysfunction, and numbness in the groin. Patient reassured and will be discharged to home. Advised to f/u with primary care provider.  Patient is advised that if symptoms persist that they may benefit from physical therapy or even imaging studies, both of which would likely need to be arranged by the primary care provider. I have given very explicit return precautions, as noted above. Pt and/or family understand and agree with plan. Clinical Impression:  1. Acute midline thoracic back pain        Disposition referral (if applicable):  Von Anderson APRN - CNP  [place] [id number]  [phone]    Schedule an appointment as soon as possible for a visit       [address] [facility]  [place] [id number]  [phone]    If symptoms worsen      Disposition medications (if applicable):  New Prescriptions    LIDOCAINE 4 % EXTERNAL PATCH    Place 1 patch onto the skin daily    METHOCARBAMOL (ROBAXIN) 500 MG TABLET    Take 1 tablet by mouth 3 times daily as needed (muscle spasms)    NAPROXEN (NAPROSYN) 500 MG TABLET    Take 1 tablet by mouth 2 times daily as needed for Pain       ED Provider Disposition Time  DISPOSITION            Electronically signed by: Homar Newell M.D., 4/8/2021 8:31 PM      This dictation was created with voice recognition software. While attempts have been made to review the dictation as it is transcribed, on occasion the spoken word can be misinterpreted by the technology leading to omissions or inappropriate words, phrases or sentences.         Lynette Brownlee MD  04/08/21 2032

## 2021-04-08 NOTE — TELEPHONE ENCOUNTER
Brief description of triage: Back pain,   With spasm from time to time missing work due to pain     Triage indicates for patient to go to ED now if she is unable to stand to call 911 EMS to assist to transport     Care advice provided, patient verbalizes understanding; denies any other questions or concerns; instructed to call back for any new or worsening symptoms. Attention Provider: Thank you for allowing me to participate in the care of your patient. The patient was connected to triage in response to symptoms provided. Please do not respond through this encounter as the response is not directed to a shared pool. Reason for Disposition   SEVERE back pain of sudden onset and age > 61    Answer Assessment - Initial Assessment Questions  1. ONSET: \"When did the pain begin? \"       Four to five weeks ago it comes and goes and gets better with rest     2. LOCATION: \"Where does it hurt? \" (upper, mid or lower back)      Mid back left side if it were a muscle she would say lat imus dorsi at the bra area further up     3. SEVERITY: \"How bad is the pain? \"  (e.g., Scale 1-10; mild, moderate, or severe)    - MILD (1-3): doesn't interfere with normal activities     - MODERATE (4-7): interferes with normal activities or awakens from sleep     - SEVERE (8-10): excruciating pain, unable to do any normal activities       7-8 out of 10 when she makes certain movements she is unable to get out of the chair at this time due to she is not able to steady herself to stand     4. PATTERN: \"Is the pain constant? \" (e.g., yes, no; constant, intermittent)       Intermittent especially when she try's to make a movement that will involve this area   5. RADIATION: \"Does the pain shoot into your legs or elsewhere? \"      No     6. CAUSE:  \"What do you think is causing the back pain? \"       Not sure degenerative disc she is taking four advil and taking percocet left over from previous needs with no relief she states, \" is unable to move and it hurts \"  7. BACK OVERUSE:  Aislinn Oneil recent lifting of heavy objects, strenuous work or exercise? \"      Every day at her job per caller     8. MEDICATIONS: \"What have you taken so far for the pain? \" (e.g., nothing, acetaminophen, NSAIDS)      Advil and percocet     9. NEUROLOGIC SYMPTOMS: \"Do you have any weakness, numbness, or problems with bowel/bladder control? \"      Noticed the area to the back is slightly numb when touching the area of the back, no loss of bowel or bladder control she occasionally will have this but contributes to getting older     10. OTHER SYMPTOMS: \"Do you have any other symptoms? \" (e.g., fever, abdominal pain, burning with urination, blood in urine)        No     11. PREGNANCY: \"Is there any chance you are pregnant? \" (e.g., yes, no; LMP)        N/A    Protocols used: BACK PAIN-ADULT-[place]

## 2021-04-09 ENCOUNTER — TELEPHONE (OUTPATIENT)
Dept: FAMILY MEDICINE CLINIC | Age: 51
End: 2021-04-09

## 2021-04-09 NOTE — TELEPHONE ENCOUNTER
Reviewed the note. Looks like they recommended PT or further imaging of symptoms persisted. Are we able to get her scheduled for a follow up with me to see if she is doing better to help guide further plan?  Thanks

## 2021-04-09 NOTE — TELEPHONE ENCOUNTER
Pt lvm at our office stating she was in the Ed for back pain. She said they did do a Ct scan. She said they advised to her to have an MRI completed, but they told her PCP will need to order for MRI. Pt wants to now if you can order MRI so she can get completed. Please advise.

## 2021-04-19 ENCOUNTER — OFFICE VISIT (OUTPATIENT)
Dept: FAMILY MEDICINE CLINIC | Age: 51
End: 2021-04-19
Payer: COMMERCIAL

## 2021-04-19 VITALS
HEART RATE: 56 BPM | DIASTOLIC BLOOD PRESSURE: 84 MMHG | OXYGEN SATURATION: 96 % | TEMPERATURE: 97 F | RESPIRATION RATE: 19 BRPM | SYSTOLIC BLOOD PRESSURE: 138 MMHG

## 2021-04-19 DIAGNOSIS — M54.6 ACUTE LEFT-SIDED THORACIC BACK PAIN: ICD-10-CM

## 2021-04-19 DIAGNOSIS — I10 ESSENTIAL HYPERTENSION: ICD-10-CM

## 2021-04-19 DIAGNOSIS — M62.838 MUSCLE SPASM: Primary | ICD-10-CM

## 2021-04-19 PROCEDURE — 99214 OFFICE O/P EST MOD 30 MIN: CPT | Performed by: NURSE PRACTITIONER

## 2021-04-19 RX ORDER — HYDROCHLOROTHIAZIDE 50 MG/1
50 TABLET ORAL DAILY
Qty: 90 TABLET | Refills: 3 | Status: SHIPPED | OUTPATIENT
Start: 2021-04-19 | End: 2022-02-16 | Stop reason: SDUPTHER

## 2021-04-19 RX ORDER — TIZANIDINE 4 MG/1
4 TABLET ORAL 3 TIMES DAILY PRN
Qty: 30 TABLET | Refills: 1 | Status: SHIPPED | OUTPATIENT
Start: 2021-04-19 | End: 2021-07-29

## 2021-04-19 ASSESSMENT — ENCOUNTER SYMPTOMS
CHEST TIGHTNESS: 0
DIARRHEA: 0
SHORTNESS OF BREATH: 0
VOMITING: 0
COUGH: 0
CONSTIPATION: 0
BACK PAIN: 1
WHEEZING: 0
NAUSEA: 0
ABDOMINAL PAIN: 0

## 2021-04-19 ASSESSMENT — PATIENT HEALTH QUESTIONNAIRE - PHQ9
SUM OF ALL RESPONSES TO PHQ9 QUESTIONS 1 & 2: 1
1. LITTLE INTEREST OR PLEASURE IN DOING THINGS: 1
SUM OF ALL RESPONSES TO PHQ QUESTIONS 1-9: 1

## 2021-04-19 NOTE — PROGRESS NOTES
PO) Take by mouth      Licorice, Glycyrrhiza glabra, (LICORICE PO) Take 675 mg by mouth      zinc gluconate 50 MG tablet Take 50 mg by mouth daily      venlafaxine (EFFEXOR XR) 150 MG extended release capsule Take 1 capsule by mouth daily 90 capsule 1    Ascorbic Acid (VITAMIN C PO) Take by mouth      TURMERIC PO Take by mouth      Lactobacillus (ACIDOPHILUS) 100 MG CAPS Take by mouth daily      Glucosamine-Chondroitin (GLUCOSAMINE CHONDR COMPLEX PO) Take by mouth daily      docusate sodium (COLACE) 100 MG capsule Take 100 mg by mouth 2 times daily      Cinnamon (EQL CINNAMON) 500 MG CAPS Take 1,200 mg by mouth daily      APPLE CIDER VINEGAR PO Take 450 mg by mouth daily      famotidine (PEPCID) 20 MG tablet Take 20 mg by mouth nightly      magnesium gluconate (MAGONATE) 500 MG tablet Take 250 mg by mouth nightly      Potassium 99 MG TABS Take by mouth nightly       No current facility-administered medications for this visit.            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     Hypertension     PCP\"on medication for b/p - since age mid 37's\"    Hyperthyroidism     \"\"in remission since 2003\"    Lung nodule     Migraine     Last migraine: 8/2020    Type 2 diabetes mellitus without complication ([facility]     Wears glasses      Past Surgical History:   Procedure Laterality Date    ARM SURGERY Right 9/9/2020    RIGHT ULNAR NERVE TRANSPOSITION performed by Xavier Parker MD at [place] [address] Right 9/9/2020    RIGHT CARPAL TUNNEL RELEASE performed by Xavier Parker MD at [place] [address] Left 10/12/2020    LEFT CARPAL TUNNEL RELEASE, LEFT GANGLION CYST EXCISION performed by Xavier Parker MD at [address] [place] [address]  2003    CYST REMOVAL      HAND SURGERY Right 9/9/2020    RIGHT Refill: 1    3. Acute left-sided thoracic back pain  Discussed PT. Patient declined at this time. Given hand out of stretches/exercises. zanaflex PRN for spasm. Follow up PRN           Return if symptoms worsen or fail to improve.       (Please note that portions of this note may have been completed with a voice recognition program. Efforts were made to edit the dictations but occasionally words aremis-transcribed.)

## 2021-04-19 NOTE — PATIENT INSTRUCTIONS
Patient Education        Back Stretches: Exercises  Introduction  Here are some examples of exercises for stretching your back. Start each exercise slowly. Ease off the exercise if you start to have pain. Your doctor or physical therapist will tell you when you can start these exercises and which ones will work best for you. How to do the exercises  Overhead stretch   1. Stand comfortably with your feet shoulder-width apart. 2. Looking straight ahead, raise both arms over your head and reach toward the ceiling. Do not allow your head to tilt back. 3. Hold for 15 to 30 seconds, then lower your arms to your sides. 4. Repeat 2 to 4 times. Side stretch   1. Stand comfortably with your feet shoulder-width apart. 2. Raise one arm over your head, and then lean to the other side. 3. Slide your hand down your leg as you let the weight of your arm gently stretch your side muscles. Hold for 15 to 30 seconds. 4. Repeat 2 to 4 times on each side. Press-up   1. Lie on your stomach, supporting your body with your forearms. 2. Press your elbows down into the floor to raise your upper back. As you do this, relax your stomach muscles and allow your back to arch without using your back muscles. As your press up, do not let your hips or pelvis come off the floor. 3. Hold for 15 to 30 seconds, then relax. 4. Repeat 2 to 4 times. Relax and rest   1. Lie on your back with a rolled towel under your neck and a pillow under your knees. Extend your arms comfortably to your sides. 2. Relax and breathe normally. 3. Remain in this position for about 10 minutes. 4. If you can, do this 2 or 3 times each day. Follow-up care is a key part of your treatment and safety. Be sure to make and go to all appointments, and call your doctor if you are having problems. It's also a good idea to know your test results and keep a list of the medicines you take. Where can you learn more? Go to https://darlyn.[employer]. org inches back from the wall. If you feel any pain when you do this exercise, stand closer to the wall. 6. Place your hands on the wall slightly wider apart than your shoulders, and lean forward. 7. Gently lean your body toward the wall. Then push back to your starting position. Keep the motion smooth and controlled. 8. Repeat 8 to 12 times. Resisted shoulder blade squeeze   For this exercise, you will need elastic exercise material, such as surgical tubing or Thera-Band. 1. Sit or stand, holding the band in both hands in front of you. Keep your elbows close to your sides, bent at a 90-degree angle. Your palms should face up. 2. Squeeze your shoulder blades together, and move your arms to the outside, stretching the band. Be sure to keep your elbows at your sides while you do this. 3. Relax. 4. Repeat 8 to 12 times. Resisted rows   For this exercise, you will need elastic exercise material, such as surgical tubing or Thera-Band. 1. Put the band around a solid object, such as a bedpost, at about waist level. Hold one end of the band in each hand. 2. With your elbows at your sides and bent to 90 degrees, pull the band back to move your shoulder blades toward each other. Return to the starting position. 3. Repeat 8 to 12 times. Follow-up care is a key part of your treatment and safety. Be sure to make and go to all appointments, and call your doctor if you are having problems. It's also a good idea to know your test results and keep a list of the medicines you take. Where can you learn more? Go to https://Sk[employer]. org and sign in to your [employer] account. Enter Q770 in the Quincee box to learn more about \"Healthy Upper Back: Exercises. \"     If you do not have an account, please click on the \"Sign Up Now\" link. Current as of: November 16, 2020               Content Version: 12.8  © 8198-5207 Healthwise, Incorporated.    Care instructions adapted under license by [facility] ([facility]). If you have questions about a medical condition or this instruction, always ask your healthcare professional. [place] [address] any warranty or liability for your use of this information.

## 2021-04-23 ENCOUNTER — HOSPITAL ENCOUNTER (OUTPATIENT)
Dept: CT IMAGING | Age: 51
Discharge: HOME | End: 2021-04-23
Payer: COMMERCIAL

## 2021-04-23 DIAGNOSIS — R91.8 LUNG MASS: ICD-10-CM

## 2021-04-23 PROCEDURE — 71250 CT THORAX DX C-: CPT

## 2021-07-19 ENCOUNTER — TELEPHONE (OUTPATIENT)
Dept: FAMILY MEDICINE CLINIC | Age: 51
End: 2021-07-19

## 2021-07-19 NOTE — TELEPHONE ENCOUNTER
Pt would like an appointment to discuss her medications, but she cannot get here until 4:00pm. She would prefer in office. Is this okay to schedule?

## 2021-07-20 NOTE — TELEPHONE ENCOUNTER
Called and spoke to patient advised we can schedule her at 4 pm in person.  Patient is scheduled on 7/29/21

## 2021-07-21 ENCOUNTER — HOSPITAL ENCOUNTER (OUTPATIENT)
Dept: CT IMAGING | Age: 51
Discharge: HOME | End: 2021-07-21
Payer: COMMERCIAL

## 2021-07-21 DIAGNOSIS — R91.8 LUNG MASS: ICD-10-CM

## 2021-07-21 PROCEDURE — 71250 CT THORAX DX C-: CPT

## 2021-07-27 ENCOUNTER — TELEPHONE (OUTPATIENT)
Dept: BARIATRICS/WEIGHT MGMT | Age: 51
End: 2021-07-27

## 2021-07-27 NOTE — TELEPHONE ENCOUNTER
Patient has bariatric coverage through [facility]. Left message for [facility] referral.

## 2021-07-29 ENCOUNTER — OFFICE VISIT (OUTPATIENT)
Dept: FAMILY MEDICINE CLINIC | Age: 51
End: 2021-07-29
Payer: COMMERCIAL

## 2021-07-29 VITALS
OXYGEN SATURATION: 96 % | HEART RATE: 73 BPM | DIASTOLIC BLOOD PRESSURE: 80 MMHG | RESPIRATION RATE: 16 BRPM | SYSTOLIC BLOOD PRESSURE: 122 MMHG | WEIGHT: 271.2 LBS | TEMPERATURE: 97.5 F | BODY MASS INDEX: 45.13 KG/M2

## 2021-07-29 DIAGNOSIS — R68.89 HEAT INTOLERANCE: ICD-10-CM

## 2021-07-29 DIAGNOSIS — R61 EXCESSIVE SWEATING: ICD-10-CM

## 2021-07-29 DIAGNOSIS — Z11.59 ENCOUNTER FOR HEPATITIS C SCREENING TEST FOR LOW RISK PATIENT: ICD-10-CM

## 2021-07-29 DIAGNOSIS — Z23 NEED FOR PROPHYLACTIC VACCINATION AGAINST STREPTOCOCCUS PNEUMONIAE (PNEUMOCOCCUS): ICD-10-CM

## 2021-07-29 DIAGNOSIS — N39.41 URGE INCONTINENCE: Primary | ICD-10-CM

## 2021-07-29 DIAGNOSIS — Z12.31 ENCOUNTER FOR SCREENING MAMMOGRAM FOR BREAST CANCER: ICD-10-CM

## 2021-07-29 DIAGNOSIS — Z23 NEED FOR PROPHYLACTIC VACCINATION AND INOCULATION AGAINST VARICELLA: ICD-10-CM

## 2021-07-29 DIAGNOSIS — I10 ESSENTIAL HYPERTENSION: ICD-10-CM

## 2021-07-29 DIAGNOSIS — R91.8 LUNG MASS: ICD-10-CM

## 2021-07-29 DIAGNOSIS — Z23 NEED FOR PROPHYLACTIC VACCINATION AGAINST DIPHTHERIA-TETANUS-PERTUSSIS (DTP): ICD-10-CM

## 2021-07-29 DIAGNOSIS — E11.9 TYPE 2 DIABETES MELLITUS WITHOUT COMPLICATION, WITHOUT LONG-TERM CURRENT USE OF INSULIN (HCC): ICD-10-CM

## 2021-07-29 DIAGNOSIS — R49.0 HOARSENESS OF VOICE: ICD-10-CM

## 2021-07-29 PROCEDURE — 99214 OFFICE O/P EST MOD 30 MIN: CPT | Performed by: NURSE PRACTITIONER

## 2021-07-29 RX ORDER — BUSPIRONE HYDROCHLORIDE 5 MG/1
5 TABLET ORAL 2 TIMES DAILY
Qty: 180 TABLET | Refills: 1 | Status: SHIPPED | OUTPATIENT
Start: 2021-07-29 | End: 2022-02-16 | Stop reason: SDUPTHER

## 2021-07-29 RX ORDER — VENLAFAXINE HYDROCHLORIDE 150 MG/1
150 CAPSULE, EXTENDED RELEASE ORAL DAILY
Qty: 90 CAPSULE | Refills: 1 | Status: SHIPPED | OUTPATIENT
Start: 2021-07-29 | End: 2022-02-16 | Stop reason: SDUPTHER

## 2021-07-29 ASSESSMENT — ENCOUNTER SYMPTOMS
WHEEZING: 0
CHEST TIGHTNESS: 0
SHORTNESS OF BREATH: 0
VOMITING: 0
DIARRHEA: 0
ABDOMINAL PAIN: 0
CONSTIPATION: 0
NAUSEA: 0
COUGH: 0

## 2021-07-29 ASSESSMENT — PATIENT HEALTH QUESTIONNAIRE - PHQ9
SUM OF ALL RESPONSES TO PHQ QUESTIONS 1-9: 0
SUM OF ALL RESPONSES TO PHQ9 QUESTIONS 1 & 2: 0
1. LITTLE INTEREST OR PLEASURE IN DOING THINGS: 0
2. FEELING DOWN, DEPRESSED OR HOPELESS: 0
SUM OF ALL RESPONSES TO PHQ QUESTIONS 1-9: 0
SUM OF ALL RESPONSES TO PHQ QUESTIONS 1-9: 0

## 2021-07-29 NOTE — PROGRESS NOTES
SUBJECTIVE:    Long Almeida  1970  44 y.o.  female      Chief Complaint   Patient presents with    Medication Refill     HPI       Allergies   Allergen Reactions    Pcn [Penicillins] Hives     Any derivative       Current Outpatient Medications   Medication Sig Dispense Refill    busPIRone (BUSPAR) 5 MG tablet Take 1 tablet by mouth 2 times daily 180 tablet 1    metFORMIN (GLUCOPHAGE) 500 MG tablet Take 1 tablet by mouth daily (with breakfast) 90 tablet 1    venlafaxine (EFFEXOR XR) 150 MG extended release capsule Take 1 capsule by mouth daily 90 capsule 1    hydroCHLOROthiazide (HYDRODIURIL) 50 MG tablet Take 1 tablet by mouth daily 90 tablet 3    naproxen (NAPROSYN) 500 MG tablet Take 1 tablet by mouth 2 times daily as needed for Pain 14 tablet 0    B Complex-C-Iron (SUPER B-COMPLEX/IRON/VITAMIN C PO) Take by mouth      Licorice, Glycyrrhiza glabra, (LICORICE PO) Take 551 mg by mouth      zinc gluconate 50 MG tablet Take 50 mg by mouth daily      TURMERIC PO Take by mouth      Lactobacillus (ACIDOPHILUS) 100 MG CAPS Take by mouth daily      Glucosamine-Chondroitin (GLUCOSAMINE CHONDR COMPLEX PO) Take by mouth daily      docusate sodium (COLACE) 100 MG capsule Take 100 mg by mouth 2 times daily      Cinnamon (EQL CINNAMON) 500 MG CAPS Take 1,200 mg by mouth daily      famotidine (PEPCID) 20 MG tablet Take 20 mg by mouth nightly      magnesium gluconate (MAGONATE) 500 MG tablet Take 250 mg by mouth nightly      Potassium 99 MG TABS Take by mouth nightly       No current facility-administered medications for this visit.           Past Medical History:   Diagnosis Date    Acid reflux     Anxiety     Depression     Diabetes mellitus (HCC)     Pre-diabetic - diet controlled - follows with PCP    Elevated WBC count     Dr. Silvia Starks disease     Hypertension     PCP\"on medication for b/p - since age mid 37's\"    Hyperthyroidism     \"\"in remission since 2003\"    Lung nodule     Migraine     Last migraine: 2020    Type 2 diabetes mellitus without complication ([address]     Wears glasses      Past Surgical History:   Procedure Laterality Date    ARM SURGERY Right 2020    RIGHT ULNAR NERVE TRANSPOSITION performed by Ernesto Rice MD at [facility] Right 2020    RIGHT CARPAL TUNNEL RELEASE performed by Ernesto Rice MD at [facility] Left 10/12/2020    LEFT CARPAL TUNNEL RELEASE, LEFT GANGLION CYST EXCISION performed by Ern[facility] MD at [facility]  2003    CYST REMOVAL      HAND SURGERY Right 2020    RIGHT GANGLION CYST HAND LESION BIOPSY EXCISION performed by Ernesto Rice MD at [address], VAGINAL  2015    bilateral ovaries not removed   [address]/A 2019    APPENDECTOMY LAPAROSCOPIC performed by Terry Romero MD at [place] [address]      alner nerve release    TONSILLECTOMY       Social History     Socioeconomic History    Marital status:      Spouse name: None    Number of children: None    Years of education: None    Highest education level: None   Occupational History    None   Tobacco Use    Smoking status: Former Smoker     Packs/day: 1.00     Years: 20.00     Pack years: 20.00     Types: Cigarettes     Quit date:      Years since quittin.5    Smokeless tobacco: Never Used   Vaping Use    Vaping Use: Never used   Substance and Sexual Activity    Alcohol use: No    Drug use: No    Sexual activity: Yes     Partners: Male   Other Topics Concern    None   Social History Narrative    None     Social Determinants of Health     Financial Resource Strain:     Difficulty of Paying Living Expenses:    Food Insecurity:     Worried About Running Out of Food in the Last Year:     Ran Out of Food in the Last Year:    Transportation Needs:     Lack of Transportation (Medical):      Lack of Transportation (Non-Medical):    Physical Activity:     Days of Exercise per Week:     Minutes of Exercise per Session:    Stress:     Feeling of Stress :    Social Connections:     Frequency of Communication with Friends and Family:     Frequency of Social Gatherings with Friends and Family:     Attends Yazdanism Services:     Active Member of Clubs or Organizations:     Attends Club or Organization Meetings:     Marital Status:    Intimate Partner Violence:     Fear of Current or Ex-Partner:     Emotionally Abused:     Physically Abused:     Sexually Abused:      Noticed a couple months ago areas on bilateral pinky toes. Some redness with occasional tenderness. No blistering. She wears the same shoes every day. States they are slightly large for her. Complains of increased urinary urgency. She has had episodes of incontinence. Occurs more than 4 times a week. She sweats excessively. She feels her sweat at night smells like urine. Noticed in the last 3 months. Her urine has a strong odor. Feels she is heat intolerant. Hoarse voice at times. Noticed over the last 6-12 months. Takes pepcid nightly. No feeling of reflux     Type 2 diabetes mellitus without complication, without long-term current use of insulin ([facility])  Not checking blood sugar. Active at work. She checked a couple times at work and was 130s. Denies polyuria, polydipsia, polyphagia. Essential hypertension  Stable. Patient denies any exertional chest pain, dyspnea, palpitations, syncope, orthopnea, edema or paroxysmal nocturnal dyspnea. Lung mass  Lung mass stable. Follows up in 6 months for repeat scans. Review of Systems   Constitutional: Negative for appetite change, chills, fatigue and unexpected weight change. Respiratory: Negative for cough, chest tightness, shortness of breath and wheezing. Cardiovascular: Negative for chest pain, palpitations and leg swelling.    Gastrointestinal: Negative for abdominal pain, constipation, diarrhea, nausea and vomiting. Genitourinary: Positive for urgency. Musculoskeletal: Negative for arthralgias. Neurological: Negative for weakness, numbness and headaches. Hematological: Negative for adenopathy. OBJECTIVE:    /80 (Site: Left Upper Arm, Position: Sitting, Cuff Size: Large Adult)   Pulse 73   Temp 97.5 °F (36.4 °C) (Temporal)   Resp 16   Wt 271 lb 3.2 oz (123 kg)   SpO2 96%   BMI 45.13 kg/m²     Physical Exam  Constitutional:       Appearance: Normal appearance. She is well-developed. She is obese. HENT:      Head: Normocephalic and atraumatic. Right Ear: Hearing normal.      Left Ear: Hearing normal.   Neck:      Thyroid: No thyromegaly. Cardiovascular:      Rate and Rhythm: Normal rate and regular rhythm. Heart sounds: Normal heart sounds. No murmur heard. No friction rub. No gallop. Pulmonary:      Effort: Pulmonary effort is normal.      Breath sounds: Normal breath sounds. No wheezing, rhonchi or rales. Abdominal:      General: Bowel sounds are normal. There is no distension. Palpations: Abdomen is soft. Tenderness: There is no abdominal tenderness. There is no guarding. Musculoskeletal:         General: Normal range of motion. Cervical back: Normal range of motion and neck supple. Skin:     General: Skin is warm and dry. Comments: 1 cm slightly reddened, slightly callused area to bilateral pinky toe, lateral aspect   Neurological:      General: No focal deficit present. Mental Status: She is alert and oriented to person, place, and time. Psychiatric:         Mood and Affect: Mood normal.         Behavior: Behavior normal. Behavior is cooperative. Thought Content: Thought content normal.         ASSESSMENT/PLAN:    1. Encounter for screening mammogram for breast cancer  - [facility] Digital Screen Bilateral [[id number]]; Future    2.  Need for prophylactic vaccination against Streptococcus pneumoniae (pneumococcus)  - Pneumococcal polysaccharide vaccine 23-valent PPSV23    3. Need for prophylactic vaccination against diphtheria-tetanus-pertussis (DTP)    4. Need for prophylactic vaccination and inoculation against varicella    5. Type 2 diabetes mellitus without complication, without long-term current use of insulin (HCC)  Monitor glucose. The patient is asked to make an attempt to improve diet and exercise patterns to aid in medical management of this problem. - metFORMIN (GLUCOPHAGE) 500 MG tablet; Take 1 tablet by mouth daily (with breakfast)  Dispense: 90 tablet; Refill: 1    6. Urge incontinence  Referred for further evaluation  - Haroon Santiago MD, Urology, [facility]    7. Encounter for hepatitis C screening test for low risk patient  - HEPATITIS C ANTIBODY; Future    8. Heat intolerance  Labs as ordered  - TSH with Reflex  - FOLLICLE STIMULATING HORMONE; Future    9. Excessive sweating  Labs as ordered. Follow up in one month as scheduled  - CBC Auto Differential  - Comprehensive Metabolic Panel  - CORTISOL AM; Future  - ACTH; Future  - FOLLICLE STIMULATING HORMONE; Future    10. Hoarseness of voice  Could be silent reflux. Referred to ENT for further evaluation  - Amb External Referral To ENT    11. Essential hypertension  DASH diet, weight loss    12. Lung mass  Follow up in 6 months as scheduled               Return in about 4 weeks (around 8/26/2021).       (Please note that portions of this note may have been completed with a voice recognition program. Efforts were made to edit the dictations but occasionally words aremis-transcribed.)

## 2021-07-29 NOTE — ASSESSMENT & PLAN NOTE
Not checking blood sugar. Active at work. She checked a couple times at work and was 130s. Denies polyuria, polydipsia, polyphagia.

## 2021-07-30 DIAGNOSIS — Z11.59 ENCOUNTER FOR HEPATITIS C SCREENING TEST FOR LOW RISK PATIENT: ICD-10-CM

## 2021-07-30 DIAGNOSIS — R68.89 HEAT INTOLERANCE: ICD-10-CM

## 2021-07-30 DIAGNOSIS — R61 EXCESSIVE SWEATING: ICD-10-CM

## 2021-07-30 LAB
CORTISOL - AM: 14.1 UG/DL (ref 4.3–22.4)
FOLLICLE STIMULATING HORMONE: 53.6 MIU/ML
HEPATITIS C ANTIBODY INTERPRETATION: NORMAL

## 2021-08-03 ENCOUNTER — TELEPHONE (OUTPATIENT)
Dept: FAMILY MEDICINE CLINIC | Age: 51
End: 2021-08-03

## 2021-08-03 NOTE — TELEPHONE ENCOUNTER
Pt called to discuss her test results. She is curious of whether Gonsalo Wasserman has anything she wants her to change before her follow up appointment. Pt states she does not need a return call unless there is something she needs to change before then.

## 2021-08-05 NOTE — TELEPHONE ENCOUNTER
Called and spoke to patient advised Stephen Tovar does not want to make any changes at this time she want patient to schedule with urology and ENT patient verbalized understanding

## 2022-02-15 ENCOUNTER — TELEPHONE (OUTPATIENT)
Dept: FAMILY MEDICINE CLINIC | Age: 52
End: 2022-02-15

## 2022-02-15 DIAGNOSIS — F41.9 ANXIETY: Primary | ICD-10-CM

## 2022-02-15 DIAGNOSIS — I10 ESSENTIAL HYPERTENSION: ICD-10-CM

## 2022-02-15 DIAGNOSIS — R73.03 PRE-DIABETES: ICD-10-CM

## 2022-02-15 DIAGNOSIS — E11.9 TYPE 2 DIABETES MELLITUS WITHOUT COMPLICATION, WITHOUT LONG-TERM CURRENT USE OF INSULIN (HCC): ICD-10-CM

## 2022-02-15 NOTE — TELEPHONE ENCOUNTER
----- Message from [facility] [place] sent at 2/15/2022  1:41 PM EST -----  Subject: Refill Request    QUESTIONS  Name of Medication? busPIRone (BUSPAR) 5 MG tablet  Patient-reported dosage and instructions? 2 x daily  How many days do you have left? 3  Preferred Pharmacy? [address] phone number (if available)? [phone]  ---------------------------------------------------------------------------  --------------,  Name of Medication? hydroCHLOROthiazide (HYDRODIURIL) 50 MG tablet  Patient-reported dosage and instructions? 1 x daily  How many days do you have left? 3  Preferred Pharmacy? [address] phone number (if available)? [phone]  ---------------------------------------------------------------------------  --------------,  Name of Medication? venlafaxine (EFFEXOR XR) 150 MG extended release   capsule  Patient-reported dosage and instructions? 1 x daily  How many days do you have left? 3  Preferred Pharmacy? [address] phone number (if available)? [phone]  ---------------------------------------------------------------------------  --------------,  Name of Medication? metFORMIN (GLUCOPHAGE) 500 MG tablet  Patient-reported dosage and instructions? 1 x daily  How many days do you have left? 3  Preferred Pharmacy? [address] phone number (if available)? [phone]  Additional Information for Provider? Pt requesting 90 day refills, please   advise.  ---------------------------------------------------------------------------  --------------  CALL BACK INFO  What is the best way for the office to contact you? OK to leave message on   voicemail  Preferred Call Back Phone Number?  [phone]

## 2022-02-16 RX ORDER — HYDROCHLOROTHIAZIDE 50 MG/1
50 TABLET ORAL DAILY
Qty: 90 TABLET | Refills: 0 | Status: SHIPPED | OUTPATIENT
Start: 2022-02-16 | End: 2022-04-11 | Stop reason: SDUPTHER

## 2022-02-16 RX ORDER — VENLAFAXINE HYDROCHLORIDE 150 MG/1
150 CAPSULE, EXTENDED RELEASE ORAL DAILY
Qty: 90 CAPSULE | Refills: 0 | Status: SHIPPED | OUTPATIENT
Start: 2022-02-16 | End: 2022-04-11 | Stop reason: SDUPTHER

## 2022-02-16 RX ORDER — BUSPIRONE HYDROCHLORIDE 5 MG/1
5 TABLET ORAL 2 TIMES DAILY
Qty: 180 TABLET | Refills: 0 | Status: SHIPPED | OUTPATIENT
Start: 2022-02-16 | End: 2022-04-11 | Stop reason: SDUPTHER

## 2022-02-16 NOTE — TELEPHONE ENCOUNTER
BuSpar, Effexor, hydrochlorothiazide, Metformin refilled as previously prescribed; patient has follow-up with PCP scheduled April 11

## 2022-04-11 ENCOUNTER — OFFICE VISIT (OUTPATIENT)
Dept: FAMILY MEDICINE CLINIC | Age: 52
End: 2022-04-11
Payer: COMMERCIAL

## 2022-04-11 ENCOUNTER — HOSPITAL ENCOUNTER (OUTPATIENT)
Dept: CT IMAGING | Age: 52
Discharge: HOME | End: 2022-04-11
Payer: COMMERCIAL

## 2022-04-11 VITALS
BODY MASS INDEX: 44.6 KG/M2 | HEART RATE: 95 BPM | RESPIRATION RATE: 18 BRPM | TEMPERATURE: 98 F | WEIGHT: 268 LBS | SYSTOLIC BLOOD PRESSURE: 122 MMHG | DIASTOLIC BLOOD PRESSURE: 78 MMHG | OXYGEN SATURATION: 98 %

## 2022-04-11 DIAGNOSIS — E66.01 CLASS 3 SEVERE OBESITY DUE TO EXCESS CALORIES WITHOUT SERIOUS COMORBIDITY WITH BODY MASS INDEX (BMI) OF 40.0 TO 44.9 IN ADULT (HCC): ICD-10-CM

## 2022-04-11 DIAGNOSIS — R91.8 LUNG MASS: ICD-10-CM

## 2022-04-11 DIAGNOSIS — F41.9 ANXIETY: ICD-10-CM

## 2022-04-11 DIAGNOSIS — E11.9 TYPE 2 DIABETES MELLITUS WITHOUT COMPLICATION, WITHOUT LONG-TERM CURRENT USE OF INSULIN (HCC): Primary | ICD-10-CM

## 2022-04-11 DIAGNOSIS — Z12.11 SCREENING FOR COLON CANCER: ICD-10-CM

## 2022-04-11 DIAGNOSIS — Z12.31 ENCOUNTER FOR SCREENING MAMMOGRAM FOR BREAST CANCER: ICD-10-CM

## 2022-04-11 DIAGNOSIS — I10 ESSENTIAL HYPERTENSION: ICD-10-CM

## 2022-04-11 DIAGNOSIS — Z86.39 H/O HYPERTHYROIDISM: ICD-10-CM

## 2022-04-11 PROBLEM — E66.813 CLASS 3 SEVERE OBESITY DUE TO EXCESS CALORIES WITHOUT SERIOUS COMORBIDITY WITH BODY MASS INDEX (BMI) OF 40.0 TO 44.9 IN ADULT: Status: ACTIVE | Noted: 2022-04-11

## 2022-04-11 LAB
A/G RATIO: 1.5 (ref 1.1–2.2)
ALBUMIN SERPL-MCNC: 4.5 G/DL (ref 3.4–5)
ALP BLD-CCNC: 169 U/L (ref 40–129)
ALT SERPL-CCNC: 25 U/L (ref 10–40)
ANION GAP SERPL CALCULATED.3IONS-SCNC: 17 MMOL/L (ref 3–16)
AST SERPL-CCNC: 27 U/L (ref 15–37)
BASOPHILS ABSOLUTE: 0.1 K/UL (ref 0–0.2)
BASOPHILS RELATIVE PERCENT: 0.5 %
BILIRUB SERPL-MCNC: <0.2 MG/DL (ref 0–1)
BUN BLDV-MCNC: 27 MG/DL (ref 7–20)
CALCIUM SERPL-MCNC: 10 MG/DL (ref 8.3–10.6)
CHLORIDE BLD-SCNC: 100 MMOL/L (ref 99–110)
CHOLESTEROL, TOTAL: 228 MG/DL (ref 0–199)
CO2: 26 MMOL/L (ref 21–32)
CREAT SERPL-MCNC: 0.8 MG/DL (ref 0.6–1.1)
CREATININE URINE POCT: NORMAL
EOSINOPHILS ABSOLUTE: 0.5 K/UL (ref 0–0.6)
EOSINOPHILS RELATIVE PERCENT: 4 %
GFR AFRICAN AMERICAN: >60
GFR NON-AFRICAN AMERICAN: >60
GLUCOSE BLD-MCNC: 129 MG/DL (ref 70–99)
HCT VFR BLD CALC: 42.9 % (ref 36–48)
HDLC SERPL-MCNC: 49 MG/DL (ref 40–60)
HEMOGLOBIN: 14.4 G/DL (ref 12–16)
LDL CHOLESTEROL CALCULATED: 138 MG/DL
LYMPHOCYTES ABSOLUTE: 3 K/UL (ref 1–5.1)
LYMPHOCYTES RELATIVE PERCENT: 23.8 %
MCH RBC QN AUTO: 28.4 PG (ref 26–34)
MCHC RBC AUTO-ENTMCNC: 33.6 G/DL (ref 31–36)
MCV RBC AUTO: 84.6 FL (ref 80–100)
MICROALBUMIN/CREAT 24H UR: NORMAL MG/G{CREAT}
MICROALBUMIN/CREAT UR-RTO: NORMAL
MONOCYTES ABSOLUTE: 0.9 K/UL (ref 0–1.3)
MONOCYTES RELATIVE PERCENT: 7.2 %
NEUTROPHILS ABSOLUTE: 8.2 K/UL (ref 1.7–7.7)
NEUTROPHILS RELATIVE PERCENT: 64.5 %
PDW BLD-RTO: 13.9 % (ref 12.4–15.4)
PLATELET # BLD: 263 K/UL (ref 135–450)
PMV BLD AUTO: 8.6 FL (ref 5–10.5)
POTASSIUM SERPL-SCNC: 3.6 MMOL/L (ref 3.5–5.1)
RBC # BLD: 5.07 M/UL (ref 4–5.2)
SODIUM BLD-SCNC: 143 MMOL/L (ref 136–145)
TOTAL PROTEIN: 7.5 G/DL (ref 6.4–8.2)
TRIGL SERPL-MCNC: 206 MG/DL (ref 0–150)
TSH REFLEX: 2.3 UIU/ML (ref 0.27–4.2)
VLDLC SERPL CALC-MCNC: 41 MG/DL
WBC # BLD: 12.8 K/UL (ref 4–11)

## 2022-04-11 PROCEDURE — 82044 UR ALBUMIN SEMIQUANTITATIVE: CPT | Performed by: NURSE PRACTITIONER

## 2022-04-11 PROCEDURE — 83036 HEMOGLOBIN GLYCOSYLATED A1C: CPT | Performed by: NURSE PRACTITIONER

## 2022-04-11 PROCEDURE — 99214 OFFICE O/P EST MOD 30 MIN: CPT | Performed by: NURSE PRACTITIONER

## 2022-04-11 PROCEDURE — 71250 CT THORAX DX C-: CPT

## 2022-04-11 RX ORDER — HYDROCHLOROTHIAZIDE 50 MG/1
50 TABLET ORAL DAILY
Qty: 90 TABLET | Refills: 1 | Status: SHIPPED | OUTPATIENT
Start: 2022-04-11 | End: 2022-09-29 | Stop reason: SDUPTHER

## 2022-04-11 RX ORDER — NAPROXEN 500 MG/1
500 TABLET ORAL 2 TIMES DAILY PRN
Qty: 14 TABLET | Refills: 0 | Status: CANCELLED | OUTPATIENT
Start: 2022-04-11

## 2022-04-11 RX ORDER — BUSPIRONE HYDROCHLORIDE 5 MG/1
5 TABLET ORAL 2 TIMES DAILY
Qty: 180 TABLET | Refills: 1 | Status: SHIPPED | OUTPATIENT
Start: 2022-04-11 | End: 2022-04-13

## 2022-04-11 RX ORDER — VENLAFAXINE HYDROCHLORIDE 150 MG/1
150 CAPSULE, EXTENDED RELEASE ORAL DAILY
Qty: 90 CAPSULE | Refills: 1 | Status: SHIPPED | OUTPATIENT
Start: 2022-04-11 | End: 2022-09-29 | Stop reason: SDUPTHER

## 2022-04-11 ASSESSMENT — ENCOUNTER SYMPTOMS
WHEEZING: 0
ABDOMINAL PAIN: 0
NAUSEA: 0
COUGH: 0
VOMITING: 0
CHEST TIGHTNESS: 0
SHORTNESS OF BREATH: 0
DIARRHEA: 0
CONSTIPATION: 0

## 2022-04-11 ASSESSMENT — PATIENT HEALTH QUESTIONNAIRE - PHQ9
SUM OF ALL RESPONSES TO PHQ QUESTIONS 1-9: 2
SUM OF ALL RESPONSES TO PHQ QUESTIONS 1-9: 2
1. LITTLE INTEREST OR PLEASURE IN DOING THINGS: 1
SUM OF ALL RESPONSES TO PHQ9 QUESTIONS 1 & 2: 2
SUM OF ALL RESPONSES TO PHQ QUESTIONS 1-9: 2
2. FEELING DOWN, DEPRESSED OR HOPELESS: 1
SUM OF ALL RESPONSES TO PHQ QUESTIONS 1-9: 2

## 2022-04-11 NOTE — ASSESSMENT & PLAN NOTE
back from [place]--moved back last summer. 'he is the most negative person'. Worried about finances-- 'takes it to a whole other level'. Currently on buspar, effexor.

## 2022-04-11 NOTE — PROGRESS NOTES
SUBJECTIVE:    Sarika Favorite  1970  46 y.o.  female      Chief Complaint   Patient presents with    6 Month Follow-Up    Diabetes    Depression    Other     graves disease-her left eye that feels like it's bulging more     HPI       Allergies   Allergen Reactions    Pcn [Penicillins] Hives     Any derivative       Current Outpatient Medications   Medication Sig Dispense Refill    busPIRone (BUSPAR) 5 MG tablet Take 1 tablet by mouth 2 times daily 180 tablet 1    hydroCHLOROthiazide (HYDRODIURIL) 50 MG tablet Take 1 tablet by mouth daily 90 tablet 1    venlafaxine (EFFEXOR XR) 150 MG extended release capsule Take 1 capsule by mouth daily 90 capsule 1    metFORMIN (GLUCOPHAGE) 500 MG tablet Take 1 tablet by mouth daily (with breakfast) 90 tablet 1    Licorice, Glycyrrhiza glabra, (LICORICE PO) Take 240 mg by mouth      Lactobacillus (ACIDOPHILUS) 100 MG CAPS Take by mouth daily      Glucosamine-Chondroitin (GLUCOSAMINE CHONDR COMPLEX PO) Take by mouth daily      docusate sodium (COLACE) 100 MG capsule Take 100 mg by mouth 2 times daily      Cinnamon (EQL CINNAMON) 500 MG CAPS Take 1,200 mg by mouth daily      famotidine (PEPCID) 20 MG tablet Take 20 mg by mouth nightly       No current facility-administered medications for this visit.           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     Hypertension     PCP\"on medication for b/p - since age mid 37's\"    Hyperthyroidism     \"\"in remission since 2003\"    Lung nodule     Migraine     Last migraine: 8/2020    Type 2 diabetes mellitus without complication ([facility] [address]     Wears glasses      Past Surgical History:   Procedure Laterality Date    ARM SURGERY Right 9/9/2020    RIGHT ULNAR NERVE TRANSPOSITION performed by Lulu Vazquez MD at [address] [place] [address] RELEASE Right 9/9/2020    RIGHT CARPAL TUNNEL RELEASE performed by Saundra Boyer MD at [address] Left 10/12/2020    LEFT CARPAL TUNNEL RELEASE, LEFT GANGLION CYST EXCISION performed by Saundra Boyer MD at [address] [facility]  2003    CYST REMOVAL      HAND SURGERY Right 9/9/2020    RIGHT GANGLION CYST HAND LESION BIOPSY EXCISION performed by Saundra Boyer MD at [address], [place]  6/1/2015    bilateral ovaries not removed   [address] N/[address] 5/31/2019    APPENDECTOMY LAPAROSCOPIC performed by Ferna Homans, MD at [address] [facility]      alner nerve release    TONSILLECTOMY  1990s     Social History     Socioeconomic History    Marital status:      Spouse name: None    Number of children: None    Years of education: None    Highest education level: None   Occupational History    None   Tobacco Use    Smoking status: Former Smoker     Packs/day: 1.00     Years: 20.00     Pack years: 20.00     Types: Cigarettes     Quit date: 2007     Years since quitting: 15.2    Smokeless tobacco: Never Used   Vaping Use    Vaping Use: Never used   Substance and Sexual Activity    Alcohol use: No    Drug use: No    Sexual activity: Yes     Partners: Male   Other Topics Concern    None   Social History Narrative    None     Social Determinants of Health     Financial Resource Strain:     Difficulty of Paying Living Expenses: Not on file   Food Insecurity:     Worried About Running Out of Food in the Last Year: Not on file    Madyson of Food in the Last Year: Not on file   Transportation Needs:     Lack of Transportation (Medical): Not on file    Lack of Transportation (Non-Medical):  Not on file   Physical Activity:     Days of Exercise per Week: Not on file    Minutes of Exercise per Session: Not on file   Stress:     Feeling of Stress : Not on file   Social Connections:     Frequency of Communication with Friends and Family: Not on file    Frequency of Social Gatherings with Friends and Family: Not on file    Attends Catholic Services: Not on file    Active Member of Clubs or Organizations: Not on file    Attends Club or Organization Meetings: Not on file    Marital Status: Not on file   Intimate Partner Violence:     Fear of Current or Ex-Partner: Not on file    Emotionally Abused: Not on file    Physically Abused: Not on file    Sexually Abused: Not on file   Housing Stability:     Unable to Pay for Housing in the Last Year: Not on file    Number of [place] in the Last Year: Not on file    Unstable Housing in the Last Year: Not on file     Diagnosed with hyperthyroid and graves disease that was considered in remission. Diagnosed before she was pregnant with her daughter who is 25. Never on medications. She feels her left eye is bulging and noticed it a a month ago. Previously when diagnosed she had just her left eye swelling--'doctor thought I had a brain tumor'. She saw endocrinology in the past. Complains of heat intolerance. Denies constipation or diarrhea. Type 2 diabetes mellitus without complication, without long-term current use of insulin ([facility])  Not checking blood sugar. A1c 7.2. denies polyuria, polydipsia, polyphagia. Occasional numbness and tingling in the toes. Admits to eating ice cream 'any chance I get'. Lung mass  She had a CT today and has not yet received results. Essential hypertension  Patient denies any exertional chest pain, dyspnea, palpitations, syncope, orthopnea, edema or paroxysmal nocturnal dyspnea. Anxiety   back from [place]--moved back last summer. 'he is the most negative person'. Worried about finances-- 'takes it to a whole other level'. Currently on buspar, effexor.       Class 3 severe obesity due to excess calories without serious comorbidity with body mass index (BMI) of 40.0 to 44.9 in adult [facility])  No exercise outside of work. Walking 18-22k a day. Review of Systems   Constitutional: Negative for appetite change, chills, fatigue and unexpected weight change. Respiratory: Negative for cough, chest tightness, shortness of breath and wheezing. Cardiovascular: Negative for chest pain, palpitations and leg swelling. Gastrointestinal: Negative for abdominal pain, constipation, diarrhea, nausea and vomiting. Endocrine: Positive for heat intolerance. Negative for cold intolerance, polydipsia, polyphagia and polyuria. Musculoskeletal: Negative for arthralgias. Neurological: Negative for weakness, numbness and headaches. Hematological: Negative for adenopathy. OBJECTIVE:    /78   Pulse 95   Temp 98 °F (36.7 °C)   Resp 18   Wt 268 lb (121.6 kg)   SpO2 98%   BMI 44.60 kg/m²     Physical Exam  Constitutional:       Appearance: Normal appearance. She is well-developed. HENT:      Head: Normocephalic and atraumatic. Right Ear: Hearing normal.      Left Ear: Hearing normal.      Nose: Nose normal.      Mouth/Throat:      Mouth: Mucous membranes are moist.      Pharynx: Oropharynx is clear. Eyes:      General: Lids are normal.      Extraocular Movements: Extraocular movements intact. Conjunctiva/sclera: Conjunctivae normal.      Comments: Slight bulging of left eye   Neck:      Thyroid: No thyromegaly or thyroid tenderness. Vascular: No carotid bruit or JVD. Cardiovascular:      Rate and Rhythm: Normal rate and regular rhythm. Heart sounds: Normal heart sounds. No murmur heard. No friction rub. No gallop. Pulmonary:      Effort: Pulmonary effort is normal. No respiratory distress. Breath sounds: Normal breath sounds. No wheezing, rhonchi or rales. Abdominal:      Palpations: Abdomen is soft. Musculoskeletal:         General: Normal range of motion. Cervical back: Normal range of motion and neck supple. Skin:     General: Skin is warm and dry.    Neurological: Mental Status: She is alert and oriented to person, place, and time. Psychiatric:         Mood and Affect: Mood is anxious. Mood is not depressed. Behavior: Behavior normal.         Thought Content: Thought content does not include homicidal or suicidal ideation. Thought content does not include homicidal or suicidal plan. ASSESSMENT/PLAN:    1. Type 2 diabetes mellitus without complication, without long-term current use of insulin ([facility])  Monitor glucose. The patient is asked to make an attempt to improve diet and exercise patterns to aid in medical management of this problem. - POCT glycosylated hemoglobin (Hb A1C)  - POCT microalbumin  - Comprehensive Metabolic Panel  - metFORMIN (GLUCOPHAGE) 500 MG tablet; Take 1 tablet by mouth daily (with breakfast)  Dispense: 90 tablet; Refill: 1    2. Encounter for screening mammogram for breast cancer  - [facility] Digital Screen Bilateral [[id number]]; Future    3. Screening for colon cancer  - FIT-DNA (Cologuard); Future    4. Essential hypertension  DASH diet, weight loss  - LIPID PANEL  - CBC with Auto Differential  - Comprehensive Metabolic Panel  - hydroCHLOROthiazide (HYDRODIURIL) 50 MG tablet; Take 1 tablet by mouth daily  Dispense: 90 tablet; Refill: 1    5. H/O hyperthyroidism  Lab, ultrasound as ordered. She is to notify me with preference on endocrinologist  - TSH with Reflex  - US HEAD NECK SOFT TISSUE THYROID; Future    6. Anxiety  Refilled. Recommend regular physical activity  - busPIRone (BUSPAR) 5 MG tablet; Take 1 tablet by mouth 2 times daily  Dispense: 180 tablet; Refill: 1  - venlafaxine (EFFEXOR XR) 150 MG extended release capsule; Take 1 capsule by mouth daily  Dispense: 90 capsule; Refill: 1    7. Lung mass  Follow up regarding CT results    8.  Class 3 severe obesity due to excess calories without serious comorbidity with body mass index (BMI) of 40.0 to 44.9 in adult [facility])  The patient is asked to make an attempt to improve diet and exercise patterns to aid in medical management of this problem. [employer] message for endocrinology           Return in about 6 weeks (around 5/23/2022).       (Please note that portions of this note may have been completed with a voice recognition program. Efforts were made to edit the dictations but occasionally words aremis-transcribed.)

## 2022-04-11 NOTE — ASSESSMENT & PLAN NOTE
Not checking blood sugar. A1c 7.2. denies polyuria, polydipsia, polyphagia. Occasional numbness and tingling in the toes. Admits to eating ice cream 'any chance I get'.

## 2022-04-12 DIAGNOSIS — R74.8 ELEVATED ALKALINE PHOSPHATASE LEVEL: Primary | ICD-10-CM

## 2022-04-12 DIAGNOSIS — R74.8 ELEVATED ALKALINE PHOSPHATASE LEVEL: ICD-10-CM

## 2022-04-13 DIAGNOSIS — F41.9 ANXIETY: ICD-10-CM

## 2022-04-13 DIAGNOSIS — E78.2 MIXED HYPERLIPIDEMIA: Primary | ICD-10-CM

## 2022-04-13 RX ORDER — BUSPIRONE HYDROCHLORIDE 7.5 MG/1
7.5 TABLET ORAL 2 TIMES DAILY
Qty: 180 TABLET | Refills: 1 | Status: SHIPPED | OUTPATIENT
Start: 2022-04-13 | End: 2022-09-29 | Stop reason: SDUPTHER

## 2022-04-13 RX ORDER — ATORVASTATIN CALCIUM 20 MG/1
20 TABLET, FILM COATED ORAL DAILY
Qty: 90 TABLET | Refills: 3 | Status: SHIPPED | OUTPATIENT
Start: 2022-04-13 | End: 2022-09-29 | Stop reason: SDUPTHER

## 2022-04-14 LAB
5-NUCLEOTIDASE: 17 U/L (ref 0–15)
ALK PHOS OTHER CALC: 0 U/L
ALK PHOSPHATASE: 172 U/L (ref 40–120)
ALKALINE PHOSPHATASE BONE FRACTION: 77 U/L (ref 0–55)
ALKALINE PHOSPHATASE LIVER FRACTION: 95 U/L (ref 0–94)

## 2022-04-18 ENCOUNTER — HOSPITAL ENCOUNTER (OUTPATIENT)
Dept: MAMMOGRAPHY | Age: 52
Discharge: HOME | End: 2022-04-18
Payer: COMMERCIAL

## 2022-04-18 ENCOUNTER — HOSPITAL ENCOUNTER (OUTPATIENT)
Dept: ULTRASOUND IMAGING | Age: 52
Discharge: HOME | End: 2022-04-18
Payer: COMMERCIAL

## 2022-04-18 DIAGNOSIS — Z86.39 H/O HYPERTHYROIDISM: ICD-10-CM

## 2022-04-18 DIAGNOSIS — Z12.31 ENCOUNTER FOR SCREENING MAMMOGRAM FOR BREAST CANCER: ICD-10-CM

## 2022-04-18 DIAGNOSIS — R74.8 ELEVATED ALKALINE PHOSPHATASE LEVEL: Primary | ICD-10-CM

## 2022-04-18 PROCEDURE — 77067 SCR MAMMO BI INCL CAD: CPT

## 2022-04-18 PROCEDURE — 76536 US EXAM OF HEAD AND NECK: CPT

## 2022-05-11 ENCOUNTER — TELEPHONE (OUTPATIENT)
Dept: FAMILY MEDICINE CLINIC | Age: 52
End: 2022-05-11

## 2022-05-11 ENCOUNTER — HOSPITAL ENCOUNTER (OUTPATIENT)
Dept: ULTRASOUND IMAGING | Age: 52
Discharge: HOME | End: 2022-05-11
Payer: COMMERCIAL

## 2022-05-11 ENCOUNTER — HOSPITAL ENCOUNTER (OUTPATIENT)
Dept: NUCLEAR MEDICINE | Age: 52
Discharge: HOME | End: 2022-05-11
Payer: COMMERCIAL

## 2022-05-11 DIAGNOSIS — R74.8 ELEVATED ALKALINE PHOSPHATASE LEVEL: ICD-10-CM

## 2022-05-11 PROCEDURE — 76705 ECHO EXAM OF ABDOMEN: CPT

## 2022-05-11 PROCEDURE — 3430000000 HC RX DIAGNOSTIC RADIOPHARMACEUTICAL: Performed by: NURSE PRACTITIONER

## 2022-05-11 PROCEDURE — A9503 TC99M MEDRONATE: HCPCS | Performed by: NURSE PRACTITIONER

## 2022-05-11 PROCEDURE — 78306 BONE IMAGING WHOLE BODY: CPT

## 2022-05-11 RX ORDER — TC 99M MEDRONATE 20 MG/10ML
28.1 INJECTION, POWDER, LYOPHILIZED, FOR SOLUTION INTRAVENOUS
Status: COMPLETED | OUTPATIENT
Start: 2022-05-11 | End: 2022-05-11

## 2022-05-11 RX ADMIN — TC 99M MEDRONATE 28.1 MILLICURIE: 20 INJECTION, POWDER, LYOPHILIZED, FOR SOLUTION INTRAVENOUS at 08:20

## 2022-05-11 NOTE — TELEPHONE ENCOUNTER
Patient returned call concerning the ultrasound results. Advised shows fatty liver dietary changes and weight loss recommended.  Gallstone seen no infection or inflammation patient verbalized understanding

## 2022-05-25 ENCOUNTER — OFFICE VISIT (OUTPATIENT)
Dept: FAMILY MEDICINE CLINIC | Age: 52
End: 2022-05-25
Payer: COMMERCIAL

## 2022-05-25 VITALS
OXYGEN SATURATION: 100 % | WEIGHT: 260.6 LBS | BODY MASS INDEX: 43.37 KG/M2 | SYSTOLIC BLOOD PRESSURE: 136 MMHG | DIASTOLIC BLOOD PRESSURE: 72 MMHG | TEMPERATURE: 97 F | HEART RATE: 94 BPM | RESPIRATION RATE: 18 BRPM

## 2022-05-25 DIAGNOSIS — E11.9 TYPE 2 DIABETES MELLITUS WITHOUT COMPLICATION, WITHOUT LONG-TERM CURRENT USE OF INSULIN (HCC): Primary | ICD-10-CM

## 2022-05-25 DIAGNOSIS — E66.01 CLASS 3 SEVERE OBESITY DUE TO EXCESS CALORIES WITHOUT SERIOUS COMORBIDITY WITH BODY MASS INDEX (BMI) OF 40.0 TO 44.9 IN ADULT (HCC): ICD-10-CM

## 2022-05-25 DIAGNOSIS — F41.9 ANXIETY: ICD-10-CM

## 2022-05-25 DIAGNOSIS — N39.41 URGE INCONTINENCE OF URINE: ICD-10-CM

## 2022-05-25 DIAGNOSIS — I10 ESSENTIAL HYPERTENSION: ICD-10-CM

## 2022-05-25 LAB — HBA1C MFR BLD: 7 %

## 2022-05-25 PROCEDURE — 83036 HEMOGLOBIN GLYCOSYLATED A1C: CPT | Performed by: NURSE PRACTITIONER

## 2022-05-25 PROCEDURE — 3051F HG A1C>EQUAL 7.0%<8.0%: CPT | Performed by: NURSE PRACTITIONER

## 2022-05-25 PROCEDURE — 99213 OFFICE O/P EST LOW 20 MIN: CPT | Performed by: NURSE PRACTITIONER

## 2022-05-25 ASSESSMENT — ENCOUNTER SYMPTOMS
VOMITING: 0
SHORTNESS OF BREATH: 0
NAUSEA: 0
ABDOMINAL PAIN: 0
CONSTIPATION: 0
CHEST TIGHTNESS: 0
WHEEZING: 0
COUGH: 0
DIARRHEA: 0

## 2022-05-25 ASSESSMENT — PATIENT HEALTH QUESTIONNAIRE - PHQ9
SUM OF ALL RESPONSES TO PHQ QUESTIONS 1-9: 0
SUM OF ALL RESPONSES TO PHQ QUESTIONS 1-9: 0
SUM OF ALL RESPONSES TO PHQ9 QUESTIONS 1 & 2: 0
SUM OF ALL RESPONSES TO PHQ QUESTIONS 1-9: 0
2. FEELING DOWN, DEPRESSED OR HOPELESS: 0
1. LITTLE INTEREST OR PLEASURE IN DOING THINGS: 0
SUM OF ALL RESPONSES TO PHQ QUESTIONS 1-9: 0

## 2022-05-25 NOTE — PROGRESS NOTES
SUBJECTIVE:    Lacretia Latricia  1970  46 y.o.  female      Chief Complaint   Patient presents with    Diabetes     follow up      HPI     Allergies   Allergen Reactions    Pcn [Penicillins] Hives     Any derivative       Current Outpatient Medications   Medication Sig Dispense Refill    busPIRone (BUSPAR) 7.5 MG tablet Take 1 tablet by mouth 2 times daily 180 tablet 1    atorvastatin (LIPITOR) 20 MG tablet Take 1 tablet by mouth daily 90 tablet 3    hydroCHLOROthiazide (HYDRODIURIL) 50 MG tablet Take 1 tablet by mouth daily 90 tablet 1    venlafaxine (EFFEXOR XR) 150 MG extended release capsule Take 1 capsule by mouth daily 90 capsule 1    metFORMIN (GLUCOPHAGE) 500 MG tablet Take 1 tablet by mouth daily (with breakfast) 90 tablet 1    Licorice, Glycyrrhiza glabra, (LICORICE PO) Take 206 mg by mouth      Lactobacillus (ACIDOPHILUS) 100 MG CAPS Take by mouth daily      Glucosamine-Chondroitin (GLUCOSAMINE CHONDR COMPLEX PO) Take by mouth daily      docusate sodium (COLACE) 100 MG capsule Take 100 mg by mouth 2 times daily      famotidine (PEPCID) 20 MG tablet Take 20 mg by mouth nightly       No current facility-administered medications for this visit.           Past Medical History:   Diagnosis Date    Acid reflux     Anxiety     Depression     Diabetes mellitus (HCC)     Pre-diabetic - diet controlled - follows with PCP    Elevated WBC count     Dr. Iverson Sos disease     Hypertension     PCP\"on medication for b/p - since age mid 37's\"    Hyperthyroidism     \"\"in remission since 2003\"    Lung nodule     Migraine     Last migraine: 8/2020    Type 2 diabetes mellitus without complication ([facility] [address]     Wears glasses      Past Surgical History:   Procedure Laterality Date    ARM SURGERY Right 9/9/2020    RIGHT ULNAR NERVE TRANSPOSITION performed by Viv Vigil MD at [place] [address] Right 9/9/2020    RIGHT CARPAL TUNNEL RELEASE performed by Viv Vigil MD at [facility] OR    CARPAL TUNNEL RELEASE Left 10/12/2020    LEFT CARPAL TUNNEL RELEASE, LEFT GANGLION CYST EXCISION performed by Michael Panda MD at [address] [facility]  2003    CYST REMOVAL      HAND SURGERY Right 9/9/2020    RIGHT GANGLION CYST HAND LESION BIOPSY EXCISION [facility] MD [address]      age 50   [address], VAGINAL  6/1/2015    bilateral ovaries not removed   [address] 5/31/2019    APPENDECTOMY LAPAROSCOPIC performed by Tonia Norman MD at [address] [facility]      alner nerve release    TONSILLECTOMY  1990s     Social History     Socioeconomic History    Marital status:      Spouse name: None    Number of children: None    Years of education: None    Highest education level: None   Occupational History    None   Tobacco Use    Smoking status: Former Smoker     Packs/day: 1.00     Years: 20.00     Pack years: 20.00     Types: Cigarettes     Quit date: 2007     Years since quitting: 15.4    Smokeless tobacco: Never Used   Vaping Use    Vaping Use: Never used   Substance and Sexual Activity    Alcohol use: No    Drug use: No    Sexual activity: Yes     Partners: Male   Other Topics Concern    None   Social History Narrative    None     Social Determinants of Health     Financial Resource Strain:     Difficulty of Paying Living Expenses: Not on file   Food Insecurity:     Worried About Running Out of Food in the Last Year: Not on file    Madyson of Food in the Last Year: Not on file   Transportation Needs:     Lack of Transportation (Medical): Not on file    Lack of Transportation (Non-Medical):  Not on file   Physical Activity:     Days of Exercise per Week: Not on file    Minutes of Exercise per Session: Not on file   Stress:     Feeling of Stress : Not on file   Social Connections:     Frequency of Communication with Friends and Family: Not on file    Frequency of Social Gatherings with Friends and Family: Not on file    Attends Moravian Services: Not on file    Active Member of Clubs or Organizations: Not on file    Attends Club or Organization Meetings: Not on file    Marital Status: Not on file   Intimate Partner Violence:     Fear of Current or Ex-Partner: Not on file    Emotionally Abused: Not on file    Physically Abused: Not on file    Sexually Abused: Not on file   Housing Stability:     Unable to Pay for Housing in the Last Year: Not on file    Number of [place] in the Last Year: Not on file    Unstable Housing in the Last Year: Not on file     Complains of urinary urgency. States she can have incontinent episodes if she has to take the time to put toilet seat up. Admits that there are times she only urinates once during her 8 hour shift. She did not see urology    She has not seen endocrinology. TSH okay when checked last. Feels eyes are better than last time. Class 3 severe obesity due to excess calories without serious comorbidity with body mass index (BMI) of 40.0 to 44.9 in [facility])  Not exercising. She has lost weight since last appointment. Essential hypertension  Stable. Patient denies any exertional chest pain, dyspnea, palpitations, syncope, orthopnea, edema or paroxysmal nocturnal dyspnea. Type 2 diabetes mellitus without complication, without long-term current use of insulin ([facility])  a1c 7.0. Denies polyuria, polydipsia, polyphagia. She feels she has slight numbness in some of her toes. Anxiety  Mood over all okay. 'staying up way too late, I'm doing that to myself\". Review of Systems   Constitutional: Negative for appetite change, chills, fatigue and unexpected weight change. Respiratory: Negative for cough, chest tightness, shortness of breath and wheezing. Cardiovascular: Negative for chest pain, palpitations and leg swelling.    Gastrointestinal: Negative for abdominal pain, constipation, diarrhea, nausea and vomiting. Endocrine: Negative for cold intolerance, heat intolerance, polydipsia, polyphagia and polyuria. Genitourinary: Positive for urgency. Musculoskeletal: Negative for arthralgias. Neurological: Negative for weakness, numbness and headaches. Hematological: Negative for adenopathy. OBJECTIVE:    /72 (Site: Left Upper Arm, Position: Sitting, Cuff Size: Large Adult)   Pulse 94   Temp 97 °F (36.1 °C) (Infrared)   Resp 18   Wt 260 lb 9.6 oz (118.2 kg)   SpO2 100%   BMI 43.37 kg/m²     Physical Exam  Constitutional:       Appearance: Normal appearance. She is well-developed. HENT:      Head: Normocephalic and atraumatic. Right Ear: Hearing normal.      Left Ear: Hearing normal.   Neck:      Thyroid: No thyromegaly or thyroid tenderness. Vascular: No carotid bruit or JVD. Cardiovascular:      Rate and Rhythm: Normal rate and regular rhythm. Heart sounds: Normal heart sounds. No murmur heard. No friction rub. No gallop. Pulmonary:      Effort: Pulmonary effort is normal. No respiratory distress. Breath sounds: Normal breath sounds. No wheezing, rhonchi or rales. Abdominal:      Palpations: Abdomen is soft. Musculoskeletal:         General: Normal range of motion. Cervical back: Normal range of motion and neck supple. Skin:     General: Skin is warm and dry. Neurological:      Mental Status: She is alert and oriented to person, place, and time. Psychiatric:         Mood and Affect: Mood is not anxious or depressed. Behavior: Behavior normal.         Thought Content: Thought content does not include homicidal or suicidal ideation. Thought content does not include homicidal or suicidal plan. ASSESSMENT/PLAN:    1. Type 2 diabetes mellitus without complication, without long-term current use of insulin (HCC)  Glucose well controlled.  Continue current dose of metformin  - POCT glycosylated hemoglobin (Hb A1C)    2. Class 3 severe obesity due to excess calories without serious comorbidity with body mass index (BMI) of 40.0 to 44.9 in adult [facility])  The patient is asked to make an attempt to improve diet and exercise patterns to aid in medical management of this problem. 3. Essential hypertension  DASH diet, weight loss. BP controlled--continue HCTZ    4. Urge incontinence of urine  Discussed scheduled bathroom breaks. Encouraged to follow up with urology if not improving. 5. Anxiety  Continue buspar at current dose. Remain active. Return in about 4 months (around 9/25/2022), or if symptoms worsen or fail to improve.       (Please note that portions of this note may have been completed with a voice recognition program. Efforts were made to edit the dictations but occasionally words aremis-transcribed.)

## 2022-05-25 NOTE — ASSESSMENT & PLAN NOTE
a1c 7.0. Denies polyuria, polydipsia, polyphagia. She feels she has slight numbness in some of her toes.

## 2022-08-17 LAB
CHOLESTEROL: 162 MG/DL
GLUCOSE BLD-MCNC: 181 MG/DL (ref 70–99)
HDLC SERPL-MCNC: 45 MG/DL
LDL CHOLESTEROL CALCULATED: 97 MG/DL
PATIENT FASTING?: YES
TRIGL SERPL-MCNC: 100 MG/DL

## 2022-09-29 ENCOUNTER — OFFICE VISIT (OUTPATIENT)
Dept: FAMILY MEDICINE CLINIC | Age: 52
End: 2022-09-29
Payer: COMMERCIAL

## 2022-09-29 VITALS
BODY MASS INDEX: 43.97 KG/M2 | WEIGHT: 264.2 LBS | HEART RATE: 68 BPM | DIASTOLIC BLOOD PRESSURE: 78 MMHG | OXYGEN SATURATION: 96 % | RESPIRATION RATE: 18 BRPM | TEMPERATURE: 97 F | SYSTOLIC BLOOD PRESSURE: 132 MMHG

## 2022-09-29 DIAGNOSIS — E11.9 TYPE 2 DIABETES MELLITUS WITHOUT COMPLICATION, WITHOUT LONG-TERM CURRENT USE OF INSULIN (HCC): Primary | ICD-10-CM

## 2022-09-29 DIAGNOSIS — I10 ESSENTIAL HYPERTENSION: ICD-10-CM

## 2022-09-29 DIAGNOSIS — E66.01 CLASS 3 SEVERE OBESITY DUE TO EXCESS CALORIES WITHOUT SERIOUS COMORBIDITY WITH BODY MASS INDEX (BMI) OF 40.0 TO 44.9 IN ADULT (HCC): ICD-10-CM

## 2022-09-29 DIAGNOSIS — E78.2 MIXED HYPERLIPIDEMIA: ICD-10-CM

## 2022-09-29 DIAGNOSIS — R45.89 DYSPHORIC MOOD: ICD-10-CM

## 2022-09-29 DIAGNOSIS — F41.9 ANXIETY: ICD-10-CM

## 2022-09-29 LAB — HBA1C MFR BLD: 7.3 %

## 2022-09-29 PROCEDURE — 3051F HG A1C>EQUAL 7.0%<8.0%: CPT | Performed by: NURSE PRACTITIONER

## 2022-09-29 PROCEDURE — 83036 HEMOGLOBIN GLYCOSYLATED A1C: CPT | Performed by: NURSE PRACTITIONER

## 2022-09-29 PROCEDURE — 99214 OFFICE O/P EST MOD 30 MIN: CPT | Performed by: NURSE PRACTITIONER

## 2022-09-29 RX ORDER — VENLAFAXINE HYDROCHLORIDE 150 MG/1
150 CAPSULE, EXTENDED RELEASE ORAL DAILY
Qty: 90 CAPSULE | Refills: 1 | Status: SHIPPED | OUTPATIENT
Start: 2022-09-29

## 2022-09-29 RX ORDER — ATORVASTATIN CALCIUM 20 MG/1
20 TABLET, FILM COATED ORAL DAILY
Qty: 90 TABLET | Refills: 3 | Status: SHIPPED | OUTPATIENT
Start: 2022-09-29

## 2022-09-29 RX ORDER — BUSPIRONE HYDROCHLORIDE 7.5 MG/1
7.5 TABLET ORAL 2 TIMES DAILY
Qty: 180 TABLET | Refills: 1 | Status: SHIPPED | OUTPATIENT
Start: 2022-09-29

## 2022-09-29 RX ORDER — HYDROCHLOROTHIAZIDE 50 MG/1
50 TABLET ORAL DAILY
Qty: 90 TABLET | Refills: 1 | Status: SHIPPED | OUTPATIENT
Start: 2022-09-29

## 2022-09-29 ASSESSMENT — PATIENT HEALTH QUESTIONNAIRE - PHQ9
SUM OF ALL RESPONSES TO PHQ QUESTIONS 1-9: 2
2. FEELING DOWN, DEPRESSED OR HOPELESS: 1
SUM OF ALL RESPONSES TO PHQ9 QUESTIONS 1 & 2: 2
SUM OF ALL RESPONSES TO PHQ QUESTIONS 1-9: 2
1. LITTLE INTEREST OR PLEASURE IN DOING THINGS: 1

## 2022-09-29 ASSESSMENT — ENCOUNTER SYMPTOMS
SHORTNESS OF BREATH: 0
CONSTIPATION: 0
COUGH: 0
DIARRHEA: 0
NAUSEA: 0
VOMITING: 0
CHEST TIGHTNESS: 0
ABDOMINAL PAIN: 0
WHEEZING: 0

## 2022-09-29 ASSESSMENT — ANXIETY QUESTIONNAIRES
3. WORRYING TOO MUCH ABOUT DIFFERENT THINGS: 0
1. FEELING NERVOUS, ANXIOUS, OR ON EDGE: 0
6. BECOMING EASILY ANNOYED OR IRRITABLE: 1
7. FEELING AFRAID AS IF SOMETHING AWFUL MIGHT HAPPEN: 0
2. NOT BEING ABLE TO STOP OR CONTROL WORRYING: 0
GAD7 TOTAL SCORE: 3
5. BEING SO RESTLESS THAT IT IS HARD TO SIT STILL: 1
4. TROUBLE RELAXING: 1

## 2022-09-29 NOTE — PROGRESS NOTES
SUBJECTIVE:    Matthew Gill  1970  46 y.o.  female      Chief Complaint   Patient presents with    Follow-up     On diabetes . No questions or concerns . Needs to  husbands [employer] . Flu Vaccine     Declined take at work      HPI    Allergies   Allergen Reactions    Pcn [Penicillins] Hives     Any derivative       Current Outpatient Medications   Medication Sig Dispense Refill    busPIRone (BUSPAR) 7.5 MG tablet Take 1 tablet by mouth 2 times daily 180 tablet 1    hydroCHLOROthiazide (HYDRODIURIL) 50 MG tablet Take 1 tablet by mouth daily 90 tablet 1    metFORMIN (GLUCOPHAGE) 500 MG tablet Take 1 tablet by mouth daily (with breakfast) 90 tablet 1    venlafaxine (EFFEXOR XR) 150 MG extended release capsule Take 1 capsule by mouth daily 90 capsule 1    atorvastatin (LIPITOR) 20 MG tablet Take 1 tablet by mouth daily 90 tablet 3    Licorice, Glycyrrhiza glabra, (LICORICE PO) Take 487 mg by mouth      Lactobacillus (ACIDOPHILUS) 100 MG CAPS Take by mouth daily      Glucosamine-Chondroitin (GLUCOSAMINE CHONDR COMPLEX PO) Take by mouth daily      docusate sodium (COLACE) 100 MG capsule Take 100 mg by mouth 2 times daily      famotidine (PEPCID) 20 MG tablet Take 20 mg by mouth nightly       No current facility-administered medications for this visit.           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 [address] Left 10/12/2020    LEFT CARPAL TUNNEL RELEASE, LEFT GANGLION CYST EXCISION performed by Tommie Mckeon MD at [facility]  2003    CYST REMOVAL      HAND SURGERY Right 9/9/2020    RIGHT GANGLION CYST HAND LESION BIOPSY EXCISION performed by Tommie Mckeon MD at [place] [address] ([facility])      age 50    HYSTERECTOMY, VAGINAL  6/1/2015    bilateral ovaries not removed    [facility] N/A 5/31/2019    APPENDECTOMY LAPAROSCOPIC performed by Donnajean Spatz, MD at [address] [facility] nerve release    TONSILLECTOMY  1990s     Social History     Socioeconomic History    Marital status:      Spouse name: None    Number of children: None    Years of education: None    Highest education level: None   Tobacco Use    Smoking status: Former     Packs/day: 1.00     Years: 20.00     Pack years: 20.00     Types: Cigarettes     Quit date: 2007     Years since quitting: 15.7    Smokeless tobacco: Never   Vaping Use    Vaping Use: Never used   Substance and Sexual Activity    Alcohol use: No    Drug use: No    Sexual activity: Yes     Partners: Male         Type 2 diabetes mellitus without complication, without long-term current use of insulin ([address]  Not exercising or checking glucose. Poor eating habits. Denies polyuria, polydipsia, polyphagia. Weight up from last appointment     Essential hypertension  Stable. Patient denies any exertional chest pain, dyspnea, palpitations, syncope, orthopnea, edema or paroxysmal nocturnal dyspnea. Anxiety  She feels anxiety okay, but feeling a little down over the last 1-2 months. Daughter moved in with her boyfriend and is now engaged. She is 'not feeling like myself'. Not sleeping well--admits to staying up late and then has difficulty waking up in the morning.  She feels  is negative and it impacts her mood. THERESE-7 SCREENING 9/29/2022 1/27/2021 7/29/2020   Feeling nervous, anxious, or on edge Not at all - -   Not being able to stop or control worrying Not at all - -   Worrying too much about different things Not at all - -   Trouble relaxing Several days - -   Being so restless that it is hard to sit still Several days - -   Becoming easily annoyed or irritable Several days - -   Feeling afraid as if something awful might happen Not at all - -   THERESE-7 Total Score 3 - -   Feeling nervous, anxious, or on edge - 1-Several days 1-Several days   Not able to stop or control worrying - 1-Several days 0-Not at all   Worrying too much about different things - 2-Over half the days 0-Not at all   Trouble relaxing - 2-Over half the days 1-Several days   Being so restless that it's hard to sit still - 1-Several days 1-Several days   Becoming easily annoyed or irritable - 1-Several days 1-Several days   Feeling afraid as if something awful might happen - 1-Several days 0-Not at all   THERESE-7 Total Score - 9 4     PHQ Scores 9/29/2022 5/25/2022 4/11/2022 7/29/2021 4/19/2021 3/3/2021 1/27/2021   PHQ2 Score 2 0 2 0 1 1 3   PHQ9 Score 2 0 2 0 1 1 13     Interpretation of Total Score Depression Severity: 1-4 = Minimal depression, 5-9 = Mild depression, 10-14 = Moderate depression, 15-19 = Moderately severe depression, 20-27 = Severe depression           Review of Systems   Constitutional:  Negative for appetite change, chills, fatigue and unexpected weight change. Respiratory:  Negative for cough, chest tightness, shortness of breath and wheezing. Cardiovascular:  Negative for chest pain, palpitations and leg swelling. Gastrointestinal:  Negative for abdominal pain, constipation, diarrhea, nausea and vomiting. Endocrine: Negative for polydipsia, polyphagia and polyuria. Musculoskeletal:  Negative for arthralgias. Neurological:  Negative for weakness, numbness and headaches. Hematological:  Negative for adenopathy. Psychiatric/Behavioral:  Positive for dysphoric mood and sleep disturbance. The patient is not nervous/anxious. OBJECTIVE:    /78 (Site: Left Upper Arm, Position: Sitting, Cuff Size: Large Adult)   Pulse 68   Temp 97 °F (36.1 °C) (Infrared)   Resp 18   Wt 264 lb 3.2 oz (119.8 kg)   SpO2 96%   BMI 43.97 kg/m²     Physical Exam  Constitutional:       Appearance: Normal appearance. She is well-developed. HENT:      Head: Normocephalic and atraumatic. Right Ear: Hearing normal.      Left Ear: Hearing normal.   Neck:      Vascular: No carotid bruit or JVD. Cardiovascular:      Rate and Rhythm: Normal rate and regular rhythm. Pulses:           Dorsalis pedis pulses are 2+ on the right side and 2+ on the left side. Posterior tibial pulses are 2+ on the right side and 2+ on the left side. Heart sounds: Normal heart sounds. No murmur heard. No friction rub. No gallop. Pulmonary:      Effort: Pulmonary effort is normal. No respiratory distress. Breath sounds: Normal breath sounds. No wheezing, rhonchi or rales. Abdominal:      Palpations: Abdomen is soft. Musculoskeletal:         General: Normal range of motion. Cervical back: Normal range of motion and neck supple. Feet:      Right foot:      Protective Sensation: 10 sites tested. 10 sites sensed. Skin integrity: Skin integrity normal.      Left foot:      Protective Sensation: 10 sites tested. 10 sites sensed. Skin integrity: Skin integrity normal.   Skin:     General: Skin is warm and dry. Neurological:      General: No focal deficit present. Mental Status: She is alert and oriented to person, place, and time. Psychiatric:         Mood and Affect: Mood normal.         Behavior: Behavior normal. Behavior is cooperative. Thought Content: Thought content normal.       ASSESSMENT/PLAN:    1.  Type 2 diabetes mellitus without complication, without long-term current use of insulin ([facility])  A1c 7.3. The patient is asked to make an attempt to improve diet and exercise patterns to aid in medical management of this problem. Continue metformin 500 mg daily  - POCT glycosylated hemoglobin (Hb A1C)  - metFORMIN (GLUCOPHAGE) 500 MG tablet; Take 1 tablet by mouth daily (with breakfast)  Dispense: 90 tablet; Refill: 1  -  DIABETES FOOT EXAM    2. Anxiety  Continue buspar, effexor. Encouraged regular physical activity to aid in mood  - busPIRone (BUSPAR) 7.5 MG tablet; Take 1 tablet by mouth 2 times daily  Dispense: 180 tablet; Refill: 1  - venlafaxine (EFFEXOR XR) 150 MG extended release capsule; Take 1 capsule by mouth daily  Dispense: 90 capsule; Refill: 1    3. Essential hypertension  DASH diet, weight loss. - hydroCHLOROthiazide (HYDRODIURIL) 50 MG tablet; Take 1 tablet by mouth daily  Dispense: 90 tablet; Refill: 1    4. Mixed hyperlipidemia  - atorvastatin (LIPITOR) 20 MG tablet; Take 1 tablet by mouth daily  Dispense: 90 tablet; Refill: 3    5. Class 3 severe obesity due to excess calories without serious comorbidity with body mass index (BMI) of 40.0 to 44.9 in adult [facility])  The patient is asked to make an attempt to improve diet and exercise patterns to aid in medical management of this problem. 6. Dysphoric mood  Discussed regular physical activity to aid in mood. Continue effexor. Rediscover hobbies, interests. Return in about 4 months (around 1/29/2023), or if symptoms worsen or fail to improve, for diabetes.       (Please note that portions of this note may have been completed with a voice recognition program. Efforts were made to edit the dictations but occasionally words aremis-transcribed.)

## 2022-09-29 NOTE — ASSESSMENT & PLAN NOTE
She feels anxiety okay, but feeling a little down over the last 1-2 months. Daughter moved in with her boyfriend and is now engaged. She is 'not feeling like myself'. Not sleeping well--admits to staying up late and then has difficulty waking up in the morning. She feels  is negative and it impacts her mood.    THERESE-7 SCREENING 9/29/2022 1/27/2021 7/29/2020   Feeling nervous, anxious, or on edge Not at all - -   Not being able to stop or control worrying Not at all - -   Worrying too much about different things Not at all - -   Trouble relaxing Several days - -   Being so restless that it is hard to sit still Several days - -   Becoming easily annoyed or irritable Several days - -   Feeling afraid as if something awful might happen Not at all - -   THERESE-7 Total Score 3 - -   Feeling nervous, anxious, or on edge - 1-Several days 1-Several days   Not able to stop or control worrying - 1-Several days 0-Not at all   Worrying too much about different things - 2-Over half the days 0-Not at all   Trouble relaxing - 2-Over half the days 1-Several days   Being so restless that it's hard to sit still - 1-Several days 1-Several days   Becoming easily annoyed or irritable - 1-Several days 1-Several days   Feeling afraid as if something awful might happen - 1-Several days 0-Not at all   THERESE-7 Total Score - 9 4     PHQ Scores 9/29/2022 5/25/2022 4/11/2022 7/29/2021 4/19/2021 3/3/2021 1/27/2021   PHQ2 Score 2 0 2 0 1 1 3   PHQ9 Score 2 0 2 0 1 1 13     Interpretation of Total Score Depression Severity: 1-4 = Minimal depression, 5-9 = Mild depression, 10-14 = Moderate depression, 15-19 = Moderately severe depression, 20-27 = Severe depression

## 2022-09-29 NOTE — ASSESSMENT & PLAN NOTE
Not exercising or checking glucose. Poor eating habits. Denies polyuria, polydipsia, polyphagia.  Weight up from last appointment

## 2022-12-01 ENCOUNTER — TELEPHONE (OUTPATIENT)
Dept: FAMILY MEDICINE CLINIC | Age: 52
End: 2022-12-01

## 2022-12-01 NOTE — TELEPHONE ENCOUNTER
----- Message from Cara Gunderson sent at 12/1/2022  3:47 PM EST -----  Subject: Message to Provider    QUESTIONS  Information for Provider? pt called and tried getting an appt due to   having sinus issues for a couple months now with an eye migraine as well. pt tried to go to walk-in clinic and was not able to be seen, there was no   appts available. pt was wondering if she could get an antibiotic sent over   to [facility] in [place] for this. please call pt to advise or confirm. ---------------------------------------------------------------------------  --------------  Johann TORIBIO  [phone]; OK to leave message on voicemail  ---------------------------------------------------------------------------  --------------  SCRIPT ANSWERS  Relationship to Patient?  Self

## 2022-12-01 NOTE — TELEPHONE ENCOUNTER
This nurse spoke with client and shared that an appointment would be needed for treatment. PCP was not able to order an antibiotic with out assessment. Appointment scheduled for 12/2/22 at  the [facility]. Client verbalized appreciation for return call.

## 2022-12-02 ENCOUNTER — OFFICE VISIT (OUTPATIENT)
Dept: INTERNAL MEDICINE CLINIC | Age: 52
End: 2022-12-02
Payer: COMMERCIAL

## 2022-12-02 VITALS — BODY MASS INDEX: 44.1 KG/M2 | HEART RATE: 94 BPM | WEIGHT: 265 LBS | OXYGEN SATURATION: 97 % | TEMPERATURE: 98.7 F

## 2022-12-02 DIAGNOSIS — J34.89 SINUS PAIN: Primary | ICD-10-CM

## 2022-12-02 PROCEDURE — 99213 OFFICE O/P EST LOW 20 MIN: CPT | Performed by: PHYSICIAN ASSISTANT

## 2022-12-02 RX ORDER — DOXYCYCLINE HYCLATE 100 MG
100 TABLET ORAL 2 TIMES DAILY
Qty: 20 TABLET | Refills: 0 | Status: SHIPPED | OUTPATIENT
Start: 2022-12-02 | End: 2022-12-12

## 2022-12-02 RX ORDER — CETIRIZINE HYDROCHLORIDE 10 MG/1
10 TABLET ORAL DAILY
Qty: 30 TABLET | Refills: 0 | Status: SHIPPED | OUTPATIENT
Start: 2022-12-02 | End: 2023-01-01

## 2022-12-02 RX ORDER — FLUTICASONE PROPIONATE 50 MCG
1 SPRAY, SUSPENSION (ML) NASAL DAILY
Qty: 16 G | Refills: 0 | Status: SHIPPED | OUTPATIENT
Start: 2022-12-02

## 2022-12-02 NOTE — PROGRESS NOTES
12/2/22  Marvin Damian  1970  This is my first patient encounter with Marvin Damian; chart reviewed. FLU/COVID-19 CLINIC EVALUATION    HPI SYMPTOMS:  Marvin Damian is a pleasant 45 y.o. female presenting to clinic today for sinus problem. Patient reports 2 months of ongoing sinus pain and pressure, retro-orbital pain, nasal congestion and postnasal drip; patient reports she occasionally gets thick crusted and occasionally bloody mucus from her nose; patient reports minimal relief with over-the-counter cold and sinus medications and ibuprofen. Patient reports history of somewhat similar sinus problems; patient denies shortness of breath, chest pains, lightheadedness or syncope. Employer:    [] Fevers  [] Chills  [] Cough  [] Coughing up blood  [x] Chest Congestion  [x] Nasal Congestion  [] Feeling short of breath  [] Sometimes  [] Frequently  [] All the time  [] Chest pain  [x] Headaches  []Tolerable  [x] Severe  [] Sore throat  [] Muscle aches  [] Nausea  [] Vomiting  []Unable to keep fluids down  [] Diarrhea  []Severe    [x] OTHER SYMPTOMS:  fatigue    Symptom Duration:   [] 1  [] 2   [] 3   [] 4    [] 5   [] 6   [] 7   [] 8   [] 9   [] 10   [] 11   [] 12   [] 13   [] 14   [x] Longer than 14 days    Symptom course:   [x] Worsening     [] Stable     [] Improving    RISK FACTORS:    [] Pregnant or possibly pregnant  [] Age over 61  [] Diabetes  [] Heart disease  [] Asthma  [] COPD/Other chronic lung diseases  [] Active Cancer  [] On Chemotherapy  [] Taking oral steroids  [] History Lymphoma/Leukemia  [] Close contact with a lab confirmed COVID-19 patient within 14 days of symptom onset  [] History of travel from affected geographical areas within 14 days of symptom onset     Physical Exam  Constitutional:       General: She is not in acute distress. Appearance: She is obese. She is not ill-appearing or toxic-appearing. HENT:      Head: Normocephalic and atraumatic.       Comments: Frontal and ethmoid sinus tenderness to palpation     Right Ear: External ear normal.      Left Ear: External ear normal.      Ears:      Comments: Bilateral serous effusions with bulging, no erythema     Nose: Congestion present. Mouth/Throat:      Pharynx: Posterior oropharyngeal erythema present. Eyes:      Extraocular Movements: Extraocular movements intact. Pupils: Pupils are equal, round, and reactive to light. Cardiovascular:      Rate and Rhythm: Regular rhythm. Pulses: Normal pulses. Pulmonary:      Effort: Pulmonary effort is normal. No respiratory distress. Breath sounds: Normal breath sounds. Musculoskeletal:         General: Normal range of motion. Cervical back: Normal range of motion. Lymphadenopathy:      Cervical: Cervical adenopathy present. Skin:     General: Skin is warm and dry. Neurological:      General: No focal deficit present. Mental Status: She is alert and oriented to person, place, and time. Mental status is at baseline. Psychiatric:         Behavior: Behavior normal.       VITALS:  Vitals:    12/02/22 1103   Pulse: 94   Temp: 98.7 °F (37.1 °C)   SpO2: 97%   Weight: 265 lb (120.2 kg)          ASSESSMENT/PLAN:  1. Sinus pain  Symptoms ongoing now for several months; trial sinus irrigation, salt water gargles, antihistamine and Flonase; antibiotic as prescribed; reviewed proper use, monitoring, side effects of medication sent in; Return to clinic or report to emergency department if symptoms worsen, change, persist.  - doxycycline hyclate (VIBRA-TABS) 100 MG tablet; Take 1 tablet by mouth 2 times daily for 10 days  Dispense: 20 tablet; Refill: 0  - fluticasone (FLONASE) 50 MCG/ACT nasal spray; 1 spray by Each Nostril route daily  Dispense: 16 g; Refill: 0  - cetirizine (ZYRTEC) 10 MG tablet; Take 1 tablet by mouth daily  Dispense: 30 tablet; Refill: 0        An  electronic signature was used to authenticate this note.      --AMINA De La Cruz on 12/2/2022 at 12:14 PM

## 2022-12-02 NOTE — PROGRESS NOTES
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

## 2022-12-13 NOTE — PROGRESS NOTES
SUBJECTIVE:    Carlos Moreno  1970  46 y.o.  female      Chief Complaint   Patient presents with    Rash     neck and arms. HPI     Symptom onset 1-2 weeks ago. First noticed just a couple spots on her lower arm. Now rash has spread up bilateral arms, neck and a couple spots on her legs. Red, raised papules. Nothing in armpits, groin. Does not itch worse anytime of day. Itching worse with heat. She was at [facility] 1.5 weeks ago--they were fishing outside. She has tried cortisone with minimal relief. Symptoms gradually worsening. Allergies   Allergen Reactions    Pcn [Penicillins] Hives     Any derivative       Current Outpatient Medications   Medication Sig Dispense Refill    methylPREDNISolone (MEDROL DOSEPACK) 4 MG tablet Take by mouth. 1 kit 0    venlafaxine (EFFEXOR XR) 75 MG extended release capsule Take 1 capsule by mouth daily 30 capsule 1    hydrochlorothiazide (HYDRODIURIL) 50 MG tablet Take 1 tablet by mouth daily 90 tablet 3     No current facility-administered medications for this visit.            Past Medical History:   Diagnosis Date    [facility] [address]     Graves disease     Hypertension     Hyperthyroidism     Migraine     Type 2 diabetes mellitus without complication [facility] [address]      Past Surgical History:   Procedure Laterality Date     SECTION      HYSTERECTOMY  2015    bilateral ovaries not removed    HYSTERECTOMY, VAGINAL      LAMINECTOMY      LAPAROSCOPIC APPENDECTOMY N/A 2019    APPENDECTOMY LAPAROSCOPIC performed by Rayo Lassiter MD at [address] [place] [address] History     Socioeconomic History    Marital status:      Spouse name: None    Number of children: None    Years of education: None    Highest education level: None   Occupational History    None   Social Needs    Financial resource strain: None    Food insecurity:     Worry: None     Inability: None    Transportation needs: Otezla Counseling: The side effects of Otezla were discussed with the patient, including but not limited to worsening or new depression, weight loss, diarrhea, nausea, upper respiratory tract infection, and headache. Patient instructed to call the office should any adverse effect occur.  The patient verbalized understanding of the proper use and possible adverse effects of Otezla.  All the patient's questions and concerns were addressed.

## 2023-01-02 ENCOUNTER — TELEPHONE (OUTPATIENT)
Dept: FAMILY MEDICINE CLINIC | Age: 53
End: 2023-01-02

## 2023-01-02 DIAGNOSIS — U07.1 COVID-19: Primary | ICD-10-CM

## 2023-01-02 NOTE — TELEPHONE ENCOUNTER
Symptom onset 3-4 days ago. Include headache, congestion, sore throat, cough, fever. She has been taking OTC medication without success. Candidate for paxlovid. Advised to not take buspar, lipitor while taking paxlovid. ED for severe SOB or chest pain.

## 2023-01-19 ENCOUNTER — OFFICE VISIT MH/BH (OUTPATIENT)
Dept: BARIATRICS/WEIGHT MGMT | Age: 53
End: 2023-01-19

## 2023-01-19 ENCOUNTER — HOSPITAL ENCOUNTER (OUTPATIENT)
Age: 53
Discharge: HOME | End: 2023-01-19
Payer: COMMERCIAL

## 2023-01-19 VITALS
OXYGEN SATURATION: 96 % | DIASTOLIC BLOOD PRESSURE: 78 MMHG | BODY MASS INDEX: 42.77 KG/M2 | WEIGHT: 266.1 LBS | SYSTOLIC BLOOD PRESSURE: 132 MMHG | HEIGHT: 66 IN | HEART RATE: 73 BPM

## 2023-01-19 DIAGNOSIS — Z79.899 MEDICATION MANAGEMENT: Primary | ICD-10-CM

## 2023-01-19 DIAGNOSIS — R91.8 LUNG MASS: ICD-10-CM

## 2023-01-19 DIAGNOSIS — E66.01 MORBID OBESITY WITH BMI OF 40.0-44.9, ADULT (HCC): ICD-10-CM

## 2023-01-19 DIAGNOSIS — Z79.899 MEDICATION MANAGEMENT: ICD-10-CM

## 2023-01-19 DIAGNOSIS — Z12.11 SCREENING FOR COLON CANCER: ICD-10-CM

## 2023-01-19 LAB
ALBUMIN SERPL-MCNC: 4.4 GM/DL (ref 3.4–5)
ALP BLD-CCNC: 167 IU/L (ref 40–128)
ALT SERPL-CCNC: 27 U/L (ref 10–40)
AMPHETAMINES: NEGATIVE
ANION GAP SERPL CALCULATED.3IONS-SCNC: 9 MMOL/L (ref 4–16)
AST SERPL-CCNC: 35 IU/L (ref 15–37)
BARBITURATE SCREEN URINE: NEGATIVE
BASOPHILS ABSOLUTE: 0.1 K/CU MM
BASOPHILS RELATIVE PERCENT: 0.5 % (ref 0–1)
BENZODIAZEPINE SCREEN, URINE: NEGATIVE
BILIRUB SERPL-MCNC: 0.3 MG/DL (ref 0–1)
BUN BLDV-MCNC: 21 MG/DL (ref 6–23)
CALCIUM SERPL-MCNC: 9.9 MG/DL (ref 8.3–10.6)
CANNABINOID SCREEN URINE: NEGATIVE
CHLORIDE BLD-SCNC: 97 MMOL/L (ref 99–110)
CO2: 32 MMOL/L (ref 21–32)
COCAINE METABOLITE: NEGATIVE
CREAT SERPL-MCNC: 0.7 MG/DL (ref 0.6–1.1)
DIFFERENTIAL TYPE: ABNORMAL
EOSINOPHILS ABSOLUTE: 0.5 K/CU MM
EOSINOPHILS RELATIVE PERCENT: 5.3 % (ref 0–3)
GFR SERPL CREATININE-BSD FRML MDRD: >60 ML/MIN/1.73M2
GLUCOSE BLD-MCNC: 147 MG/DL (ref 70–99)
HCT VFR BLD CALC: 44.4 % (ref 37–47)
HEMOGLOBIN: 13.9 GM/DL (ref 12.5–16)
IMMATURE NEUTROPHIL %: 0.2 % (ref 0–0.43)
LYMPHOCYTES ABSOLUTE: 2.6 K/CU MM
LYMPHOCYTES RELATIVE PERCENT: 26.1 % (ref 24–44)
MCH RBC QN AUTO: 27.6 PG (ref 27–31)
MCHC RBC AUTO-ENTMCNC: 31.3 % (ref 32–36)
MCV RBC AUTO: 88.3 FL (ref 78–100)
MONOCYTES ABSOLUTE: 0.9 K/CU MM
MONOCYTES RELATIVE PERCENT: 8.8 % (ref 0–4)
NUCLEATED RBC %: 0 %
OPIATES, URINE: NEGATIVE
OXYCODONE: NEGATIVE
PDW BLD-RTO: 13.6 % (ref 11.7–14.9)
PHENCYCLIDINE, URINE: NEGATIVE
PLATELET # BLD: 345 K/CU MM (ref 140–440)
PMV BLD AUTO: 10.2 FL (ref 7.5–11.1)
POTASSIUM SERPL-SCNC: 3.9 MMOL/L (ref 3.5–5.1)
RBC # BLD: 5.03 M/CU MM (ref 4.2–5.4)
SEGMENTED NEUTROPHILS ABSOLUTE COUNT: 5.9 K/CU MM
SEGMENTED NEUTROPHILS RELATIVE PERCENT: 59.1 % (ref 36–66)
SODIUM BLD-SCNC: 138 MMOL/L (ref 135–145)
TOTAL IMMATURE NEUTOROPHIL: 0.02 K/CU MM
TOTAL NUCLEATED RBC: 0 K/CU MM
TOTAL PROTEIN: 7.1 GM/DL (ref 6.4–8.2)
WBC # BLD: 9.9 K/CU MM (ref 4–10.5)

## 2023-01-19 PROCEDURE — 80053 COMPREHEN METABOLIC PANEL: CPT

## 2023-01-19 PROCEDURE — 36415 COLL VENOUS BLD VENIPUNCTURE: CPT

## 2023-01-19 PROCEDURE — 93005 ELECTROCARDIOGRAM TRACING: CPT | Performed by: NURSE PRACTITIONER

## 2023-01-19 PROCEDURE — 85025 COMPLETE CBC W/AUTO DIFF WBC: CPT

## 2023-01-19 PROCEDURE — 80307 DRUG TEST PRSMV CHEM ANLYZR: CPT

## 2023-01-19 ASSESSMENT — PATIENT HEALTH QUESTIONNAIRE - PHQ9
2. FEELING DOWN, DEPRESSED OR HOPELESS: 1
SUM OF ALL RESPONSES TO PHQ QUESTIONS 1-9: 2
1. LITTLE INTEREST OR PLEASURE IN DOING THINGS: 1
SUM OF ALL RESPONSES TO PHQ QUESTIONS 1-9: 2
SUM OF ALL RESPONSES TO PHQ9 QUESTIONS 1 & 2: 2
SUM OF ALL RESPONSES TO PHQ QUESTIONS 1-9: 2
SUM OF ALL RESPONSES TO PHQ QUESTIONS 1-9: 2

## 2023-01-19 NOTE — PROGRESS NOTES
Chief Complaint   Patient presents with    Weight Management     NP- 1ST medication wm visit. Has bar cov         SUBJECTIVE:    HPI: Patient is here with complaints of weight gain and inability to lose weight per self. Inquiring about weight loss medications to assist with their weight loss goal.    Obesity with a BMI of Body mass index is 43.61 kg/m². Devang Cordero Patient has failed to lose 5% of body weight for many years. Any history of glaucoma? DENIES    Any history of drug abuse? DENIES    Any history of CAD, uncontrolled HTN, arrhythmias, stroke, or CHF? ? DENIES    Any history of hyperthyroidism? Denies    Any current or recent use of MAOIs? DENIES    Current dietary measures: not tracking calories    Current exercise measures: not currently    I have reviewed the patient's(pertinent information to this visit) medical history, family history(scanned in  the [address] [place] [address] under \"patient questioner\"), social history and review of systems with the patient today in the office.           Past Surgical History:   Procedure Laterality Date    ARM SURGERY Right 9/9/2020    RIGHT ULNAR NERVE TRANSPOSITION performed by Tatiana Garcia MD at . [address] Right 9/9/2020    RIGHT CARPAL TUNNEL RELEASE performed by Tatiana Garcia MD at . [address] Left 10/12/2020    LEFT CARPAL TUNNEL RELEASE, LEFT GANGLION CYST EXCISION performed by Tatiana Garcia MD at [address] [place] [address]  2003    CYST REMOVAL      HAND SURGERY Right 9/9/2020    RIGHT GANGLION CYST HAND LESION BIOPSY EXCISION performed by Tatiana Garcia MD at [place] [address] ([address] [place] [address])      age 50    HYSTERECTOMY, VAGINAL  6/1/2015    bilateral ovaries not removed    [facility] N/A 5/31/2019    APPENDECTOMY LAPAROSCOPIC performed by Pauline Faith MD at [address] [facility] [facility] nerve release    TONSILLECTOMY  1990s       Past Medical History:   Diagnosis Date    Acid reflux     Anxiety     Depression     Diabetes mellitus (HCC)     Pre-diabetic - diet controlled - follows with PCP    Elevated WBC count     Dr. Garcia Angelina disease     Hypertension     PCP\"on medication for b/p - since age mid 37's\"    Hyperthyroidism     \"\"in remission since \"    Lung nodule     Migraine     Last migraine: 2020    Type 2 diabetes mellitus without complication ([facility] [address]     Wears glasses        Family History   Problem Relation Age of Onset    Other Mother         thyroid issues    High Blood Pressure Mother     High Cholesterol Mother     Heart Disease Father     Coronary Art Dis Father     High Cholesterol Father     Other Father         Osteoarthritis    Other Maternal Grandmother         brain shunt; hydrocephalus    Heart Attack Maternal Grandfather     Alzheimer's Disease Paternal Grandmother     Heart Attack Paternal Grandfather     Stroke Paternal Grandfather     High Cholesterol Sister     Asthma Sister     Breast Cancer Maternal Aunt         post menopausal    Breast Cancer Paternal Aunt         post menopausal    Ovarian Cancer Neg Hx        Social History     Socioeconomic History    Marital status:      Spouse name: Not on file    Number of children: Not on file    Years of education: Not on file    Highest education level: Not on file   Occupational History    Not on file   Tobacco Use    Smoking status: Former     Packs/day: 1.00     Years: 20.00     Pack years: 20.00     Types: Cigarettes     Quit date:      Years since quittin.0    Smokeless tobacco: Never   Vaping Use    Vaping Use: Never used   Substance and Sexual Activity    Alcohol use: No    Drug use: No    Sexual activity: Yes     Partners: Male   Other Topics Concern    Not on file   Social History Narrative    Not on file     Social Determinants of Health     Financial Resource Strain: Not on file   Food Insecurity: Not on file   Transportation Needs: Not on file   Physical Activity: Not on file   Stress: Not on file   Social Connections: Not on file   Intimate Partner Violence: Not on file   Housing Stability: Not on file       Current Outpatient Medications   Medication Sig Dispense Refill    fluticasone (FLONASE) 50 MCG/ACT nasal spray 1 spray by Each Nostril route daily (Patient taking differently: 1 spray by Each Nostril route as needed) 16 g 0    busPIRone (BUSPAR) 7.5 MG tablet Take 1 tablet by mouth 2 times daily 180 tablet 1    hydroCHLOROthiazide (HYDRODIURIL) 50 MG tablet Take 1 tablet by mouth daily 90 tablet 1    metFORMIN (GLUCOPHAGE) 500 MG tablet Take 1 tablet by mouth daily (with breakfast) 90 tablet 1    venlafaxine (EFFEXOR XR) 150 MG extended release capsule Take 1 capsule by mouth daily 90 capsule 1    atorvastatin (LIPITOR) 20 MG tablet Take 1 tablet by mouth daily 90 tablet 3    Licorice, Glycyrrhiza glabra, (LICORICE PO) Take 739 mg by mouth      Lactobacillus (ACIDOPHILUS) 100 MG CAPS Take by mouth daily      docusate sodium (COLACE) 100 MG capsule Take 100 mg by mouth daily      famotidine (PEPCID) 20 MG tablet Take 20 mg by mouth nightly      Glucosamine-Chondroitin (GLUCOSAMINE CHONDR COMPLEX PO) Take by mouth daily (Patient not taking: Reported on 1/19/2023)       No current facility-administered medications for this visit. Allergies   Allergen Reactions    Pcn [Penicillins] Hives     Any derivative       Review of Systems:         Review of Systems   Musculoskeletal:  Positive for myalgias. Psychiatric/Behavioral:  The patient is nervous/anxious. Depression at times   All other systems reviewed and are negative. OBJECTIVE:  Physical Exam:    /78 (Site: Left Upper Arm, Position: Sitting, Cuff Size: Large Adult)   Pulse 73   Ht 5' 5.5\" (1.664 m)   Wt 266 lb 1.6 oz (120.7 kg)   SpO2 96%   BMI 43.61 kg/m²      Physical Exam  Constitutional:       Appearance: Normal appearance. She is obese. HENT:      Head: Normocephalic.       Nose: Nose normal.      Mouth/Throat:      Pharynx: Oropharynx is clear. Eyes:      Conjunctiva/sclera: Conjunctivae normal.      Pupils: Pupils are equal, round, and reactive to light. Cardiovascular:      Rate and Rhythm: Normal rate. Pulses: Normal pulses. Pulmonary:      Effort: Pulmonary effort is normal.   Abdominal:      Palpations: Abdomen is soft. Musculoskeletal:         General: Normal range of motion. Cervical back: Normal range of motion. Skin:     General: Skin is warm and dry. Capillary Refill: Capillary refill takes less than 2 seconds. Neurological:      General: No focal deficit present. Mental Status: She is alert and oriented to person, place, and time. Psychiatric:         Mood and Affect: Mood normal.         Behavior: Behavior normal.         ASSESSMENT:  1. Morbid obesity with BMI of 40.0-44.9, adult ([facility] [address]  - Start tracking calories; about  3942-1072 daily  - 64 oz water daily  - Increase activity as able  - Referral RD    2. Medication management  - Medications limited d/t insurance coverage  - Would benefit from Adipex  - Baseline workup ordered  - RTC 1-2 weeks       PLAN:    -Check EKG, UDS, CBC, and CMP prior to starting. Any abnormalities will be addressed prior to starting medication .    -Will plan on starting Adipex with next visit if Labs, UDS, and EKG WNL.     -BMI is over 30, or over 27 with weight-related risk factors.    -Pt aware Adipex can only be used short term (3 months). -Pt aware no breaks in treatment allowed or must be off for 6 months.    -Pt aware that weight must be lost at each visit or medication will be discontinued.     -Pt is aware that in order to be successful, must combine Adipex with appropriate diet and exercise plan. -Pt provided with medication handout that covers use; side effects (common side effects include insomnia, dry mouth, constipation, nervousness, increased heart rate, hypertension); precautions; and interactions. -Pt aware must meet monthly in person while on this medication.     -Check [place] Automated Rx Reporting System. Any concerns: NONE Reported     - Current birth control measures include: partial hysterectomy    -If elderly or renal impairment, will use lower dose (15 mg daily) and avoid all together if GFR is less than 15. N/A       Orders Placed This Encounter   Procedures    Comprehensive Metabolic Panel    CBC with Auto Differential    Urine Drug Screen    Amb Referral to Nutrition Services    EKG 12 Lead          No orders of the defined types were placed in this encounter. Follow Up:  Return in about 2 weeks (around 2/2/2023).       Hayden Dukes, SCOOTER - CNP

## 2023-01-20 LAB
EKG ATRIAL RATE: 61 BPM
EKG DIAGNOSIS: NORMAL
EKG P AXIS: 27 DEGREES
EKG P-R INTERVAL: 132 MS
EKG Q-T INTERVAL: 432 MS
EKG QRS DURATION: 84 MS
EKG QTC CALCULATION (BAZETT): 434 MS
EKG R AXIS: 69 DEGREES
EKG T AXIS: 46 DEGREES
EKG VENTRICULAR RATE: 61 BPM

## 2023-01-26 ENCOUNTER — OFFICE VISIT (OUTPATIENT)
Dept: BARIATRICS/WEIGHT MGMT | Age: 53
End: 2023-01-26

## 2023-01-26 VITALS
HEART RATE: 86 BPM | HEIGHT: 66 IN | DIASTOLIC BLOOD PRESSURE: 78 MMHG | BODY MASS INDEX: 41.51 KG/M2 | OXYGEN SATURATION: 100 % | SYSTOLIC BLOOD PRESSURE: 120 MMHG | WEIGHT: 258.3 LBS

## 2023-01-26 VITALS — BODY MASS INDEX: 41.51 KG/M2 | WEIGHT: 258.3 LBS | HEIGHT: 66 IN

## 2023-01-26 DIAGNOSIS — E66.01 MORBID OBESITY WITH BMI OF 40.0-44.9, ADULT (HCC): Primary | ICD-10-CM

## 2023-01-26 DIAGNOSIS — E66.01 MORBID OBESITY WITH BMI OF 40.0-44.9, ADULT (HCC): ICD-10-CM

## 2023-01-26 DIAGNOSIS — Z79.899 MEDICATION MANAGEMENT: Primary | ICD-10-CM

## 2023-01-26 RX ORDER — PHENTERMINE HYDROCHLORIDE 37.5 MG/1
37.5 TABLET ORAL
Qty: 30 TABLET | Refills: 0 | Status: SHIPPED | OUTPATIENT
Start: 2023-01-26 | End: 2023-02-25

## 2023-01-26 NOTE — PROGRESS NOTES
No chief complaint on file.        SUBJECTIVE:    HPI: Patient is here with complaints of weight gain and inability to lose weight per self.  Inquiring about weight loss medications to assist with their weight loss goal.    Obesity with a BMI of Body mass index is 42.33 kg/m²..    Patient has failed to lose 5% of body weight for many years.    Any history of glaucoma? Denies    Any history of drug abuse? Denies    Any history of CAD, uncontrolled HTN, arrhythmias, stroke, or CHF??  Denies    Any history of hyperthyroidism? Denies    Any current or recent use of MAOIs? Denies    Current dietary measures: tracking calories some/ portion control    Current exercise measures: not currently    I have reviewed the patient's(pertinent information to this visit) medical history, family history(scanned in  the [employer] under \"patient questioner\"), social history and review of systems with the patient today in the office.          Past Surgical History:   Procedure Laterality Date    ARM SURGERY Right 2020    RIGHT ULNAR NERVE TRANSPOSITION performed by Omar Booth MD at [facility]    CARPAL TUNNEL RELEASE Right 2020    RIGHT CARPAL TUNNEL RELEASE performed by Omar Booth MD at [facility]    CARPAL TUNNEL RELEASE Left 10/12/2020    LEFT CARPAL TUNNEL RELEASE, LEFT GANGLION CYST EXCISION performed by Omar Booth MD at [facility]     SECTION  2003    CYST REMOVAL      HAND SURGERY Right 2020    RIGHT GANGLION CYST HAND LESION BIOPSY EXCISION performed by Omar Booth MD at [facility]    HYSTERECTOMY (CERVIX STATUS UNKNOWN)      age 45    HYSTERECTOMY, VAGINAL  2015    bilateral ovaries not removed    LAMINECTOMY      LAPAROSCOPIC APPENDECTOMY N/A 2019    APPENDECTOMY LAPAROSCOPIC performed by Julissa Martínez MD at [facility]    NERVE SURGERY      alner nerve release    TONSILLECTOMY         Past Medical History:   Diagnosis Date    Acid reflux     Anxiety     Depression     Diabetes  mellitus ([facility]     Pre-diabetic - diet controlled - follows with PCP    Elevated WBC count     Dr. Mariano Puls disease     Hypertension     PCP\"on medication for b/p - since age mid 37's\"    Hyperthyroidism     \"\"in remission since \"    Lung nodule     Migraine     Last migraine: 2020    Type 2 diabetes mellitus without complication ([facility]     Wears glasses        Family History   Problem Relation Age of Onset    Other Mother         thyroid issues    High Blood Pressure Mother     High Cholesterol Mother     Heart Disease Father     Coronary Art Dis Father     High Cholesterol Father     Other Father         Osteoarthritis    Other Maternal Grandmother         brain shunt; hydrocephalus    Heart Attack Maternal Grandfather     Alzheimer's Disease Paternal Grandmother     Heart Attack Paternal Grandfather     Stroke Paternal Grandfather     High Cholesterol Sister     Asthma Sister     Breast Cancer Maternal Aunt         post menopausal    Breast Cancer Paternal Aunt         post menopausal    Ovarian Cancer Neg Hx        Social History     Socioeconomic History    Marital status:      Spouse name: Not on file    Number of children: Not on file    Years of education: Not on file    Highest education level: Not on file   Occupational History    Not on file   Tobacco Use    Smoking status: Former     Packs/day: 1.00     Years: 20.00     Pack years: 20.00     Types: Cigarettes     Quit date:      Years since quittin.0    Smokeless tobacco: Never   Vaping Use    Vaping Use: Never used   Substance and Sexual Activity    Alcohol use: No    Drug use: No    Sexual activity: Yes     Partners: Male   Other Topics Concern    Not on file   Social History Narrative    Not on file     Social Determinants of Health     Financial Resource Strain: Not on file   Food Insecurity: Not on file   Transportation Needs: Not on file   Physical Activity: Not on file   Stress: Not on file   Social Connections: Not on file   Intimate Partner Violence: Not on file   Housing Stability: Not on file       Current Outpatient Medications   Medication Sig Dispense Refill    phentermine (ADIPEX-P) 37.5 MG tablet Take 1 tablet by mouth every morning (before breakfast) for 30 days. BMI 44. Max Daily Amount: 37.5 mg 30 tablet 0    fluticasone (FLONASE) 50 MCG/ACT nasal spray 1 spray by Each Nostril route daily (Patient taking differently: 1 spray by Each Nostril route as needed) 16 g 0    busPIRone (BUSPAR) 7.5 MG tablet Take 1 tablet by mouth 2 times daily 180 tablet 1    hydroCHLOROthiazide (HYDRODIURIL) 50 MG tablet Take 1 tablet by mouth daily 90 tablet 1    metFORMIN (GLUCOPHAGE) 500 MG tablet Take 1 tablet by mouth daily (with breakfast) 90 tablet 1    venlafaxine (EFFEXOR XR) 150 MG extended release capsule Take 1 capsule by mouth daily 90 capsule 1    atorvastatin (LIPITOR) 20 MG tablet Take 1 tablet by mouth daily 90 tablet 3    Licorice, Glycyrrhiza glabra, (LICORICE PO) Take 104 mg by mouth (Patient not taking: Reported on 1/26/2023)      Lactobacillus (ACIDOPHILUS) 100 MG CAPS Take by mouth daily      Glucosamine-Chondroitin (GLUCOSAMINE CHONDR COMPLEX PO) Take by mouth daily (Patient not taking: Reported on 1/19/2023)      docusate sodium (COLACE) 100 MG capsule Take 100 mg by mouth daily      famotidine (PEPCID) 20 MG tablet Take 20 mg by mouth nightly       No current facility-administered medications for this visit. Allergies   Allergen Reactions    Pcn [Penicillins] Hives     Any derivative       Review of Systems:         Review of Systems   Musculoskeletal:  Positive for myalgias. Psychiatric/Behavioral:  The patient is nervous/anxious. All other systems reviewed and are negative. OBJECTIVE:  Physical Exam:    /78   Pulse 86   Ht 5' 5.5\" (1.664 m)   Wt 258 lb 4.8 oz (117.2 kg)   SpO2 100%   BMI 42.33 kg/m²      Physical Exam  Constitutional:       Appearance: Normal appearance. She is obese. HENT:      Head: Normocephalic. Nose: Nose normal.      Mouth/Throat:      Pharynx: Oropharynx is clear. Eyes:      Conjunctiva/sclera: Conjunctivae normal.      Pupils: Pupils are equal, round, and reactive to light. Cardiovascular:      Rate and Rhythm: Normal rate. Pulses: Normal pulses. Pulmonary:      Effort: Pulmonary effort is normal.   Abdominal:      Palpations: Abdomen is soft. Musculoskeletal:         General: Normal range of motion. Cervical back: Normal range of motion. Skin:     General: Skin is warm and dry. Capillary Refill: Capillary refill takes less than 2 seconds. Neurological:      General: No focal deficit present. Mental Status: She is alert and oriented to person, place, and time. Psychiatric:         Mood and Affect: Mood normal.         Behavior: Behavior normal.         ASSESSMENT:  1. Morbid obesity with BMI of 40.0-44.9, adult ([facility] [address]  - Start tracking calories; about 3080-2090 daily  - 64 oz water daily  - Increase activity as able      2. Medication management  - Workup reviewed  - Meets criteria to start Adipex  - RX sent to pharmacy  - Start in am and take as discussed  - Call for any concerns  - RTC one month    - phentermine (ADIPEX-P) 37.5 MG tablet; Take 1 tablet by mouth every morning (before breakfast) for 30 days. BMI 44. Max Daily Amount: 37.5 mg  Dispense: 30 tablet; Refill: 0      PLAN:    - Recent Labs, UDS, and EKG reviewed and WNL    - Patient will start on Adipex in am and take as directed    - Call for adverse side effects or concerns    - BMI is over 30, or over 27 with weight-related risk factors. - Pt aware Adipex can only be used short term (3 months). - Pt aware no breaks in treatment allowed or must be off for 6 months. - Pt aware that weight must be lost at each visit or medication will be discontinued. - Pt is aware that in order to be successful, must combine Adipex with appropriate diet and exercise plan. - Pt provided with medication handout that covers use; side effects (common side effects include insomnia, dry mouth, constipation, nervousness, increased heart rate, hypertension);    precautions; and interactions. - Pt aware must meet monthly in person while on this medication.     - Check [employer] Rx Reporting System. Any concerns: NONE Reported     - Current birth control measures include: partial hysterectomy    - If elderly or renal impairment, will use lower dose (15 mg daily) and avoid all together if GFR is less than 15. N/A     - RTC in one month    No orders of the defined types were placed in this encounter. Orders Placed This Encounter   Medications    phentermine (ADIPEX-P) 37.5 MG tablet     Sig: Take 1 tablet by mouth every morning (before breakfast) for 30 days. BMI 44. Max Daily Amount: 37.5 mg     Dispense:  30 tablet     Refill:  0        Follow Up:  Return in about 1 month (around 2/26/2023).       Mark Joseph, APRN - CNP

## 2023-01-26 NOTE — PROGRESS NOTES
Outpatient Nutrition Counseling - Non-Surgical Weight Loss Program    REASON FOR VISIT: Medication Program    Chief Complaint:    Chief Complaint   Patient presents with    Weight Management       SUBJECTIVE:  Pt here for nutrition consult in medication program. She has been planning meals and snacks, eating breakfast and lunch, and consuming/tracking calories at about 1300 daily. Her challenges include evening snacking and no strength training. The patient is a 45 y.o. female being seen for obesity,  considering Medication/Non-Surgical Weight Loss Program; Ana's, Height: 5' 5.5\" (166.4 cm), Weight: 258 lb 4.8 oz (117.2 kg), Current Body mass index is 42.33 kg/m². patient's PCP is SCOOTER Luevano - CNP    Comorbid Conditions:  Significant diseases affecting this patient are   Past Medical History:   Diagnosis Date    Acid reflux     Anxiety     Depression     Diabetes mellitus ([facility] [address]     Pre-diabetic - diet controlled - follows with PCP    Elevated WBC count     Dr. Jose Alfredo Olmedo disease     Hypertension     PCP\"on medication for b/p - since age mid 37's\"    Hyperthyroidism     \"\"in remission since 2003\"    Lung nodule     Migraine     Last migraine: 8/2020    Type 2 diabetes mellitus without complication ([facility] [address]     Wears glasses    . Review of Systems - Review of Systems  Otherwise per HPI. Allergies:   Allergies   Allergen Reactions    Pcn [Penicillins] Hives     Any derivative       Past Surgical History:  Past Surgical History:   Procedure Laterality Date    ARM SURGERY Right 9/9/2020    RIGHT ULNAR NERVE TRANSPOSITION performed by Ayesha Darby MD at . [address] Right 9/9/2020    RIGHT CARPAL TUNNEL RELEASE performed by Ayesha Darby MD at . [address] Left 10/12/2020    LEFT CARPAL TUNNEL RELEASE, LEFT GANGLION CYST EXCISION performed by Ayesha Darby MD at [address] [place] [address]  2003    CYST REMOVAL      HAND SURGERY Right 9/9/2020    RIGHT GANGLION CYST HAND LESION BIOPSY EXCISION performed by Sean Pinzon MD at [facility] ([address] [place] [address])      age 50    HYSTERECTOMY, VAGINAL  2015    bilateral ovaries not removed    [facility] N/A 2019    APPENDECTOMY LAPAROSCOPIC performed by Ander Marquez MD at [address] [facility] nerve release    TONSILLECTOMY         Family History:  Family History   Problem Relation Age of Onset    Other Mother         thyroid issues    High Blood Pressure Mother     High Cholesterol Mother     Heart Disease Father     Coronary Art Dis Father     High Cholesterol Father     Other Father         Osteoarthritis    Other Maternal Grandmother         brain shunt; hydrocephalus    Heart Attack Maternal Grandfather     Alzheimer's Disease Paternal Grandmother     Heart Attack Paternal Grandfather     Stroke Paternal Grandfather     High Cholesterol Sister     Asthma Sister     Breast Cancer Maternal Aunt         post menopausal    Breast Cancer Paternal Aunt         post menopausal    Ovarian Cancer Neg Hx        Social History:  Social History     Socioeconomic History    Marital status:      Spouse name: Not on file    Number of children: Not on file    Years of education: Not on file    Highest education level: Not on file   Occupational History    Not on file   Tobacco Use    Smoking status: Former     Packs/day: 1.00     Years: 20.00     Pack years: 20.00     Types: Cigarettes     Quit date:      Years since quittin.0    Smokeless tobacco: Never   Vaping Use    Vaping Use: Never used   Substance and Sexual Activity    Alcohol use: No    Drug use: No    Sexual activity: Yes     Partners: Male   Other Topics Concern    Not on file   Social History Narrative    Not on file     Social Determinants of Health     Financial Resource Strain: Not on file   Food Insecurity: Not on file   Transportation Needs: Not on file Physical Activity: Not on file   Stress: Not on file   Social Connections: Not on file   Intimate Partner Violence: Not on file   Housing Stability: Not on file         OBJECTIVE:  Physical Exam   Ht 5' 5.5\" (1.664 m)   Wt 258 lb 4.8 oz (117.2 kg)   BMI 42.33 kg/m²        NUTRITION DIAGNOSIS: Overweight / Obesity   Problem: Increased adiposity compared to reference standard or established norm   Etiology: Excess intake compared to output over time   S/S: Ht: 5' 5.5\" Wt: 258 lb 4.8 oz BMI: 42.33    NUTRITION INTERVENTIONS:    Individualized treatment goals to address nutrition diagnosis:   Instructed on 6694-5816 calorie diet for weight loss   Provided sample menus, healthy foods list, and plate model   Encouraged record keeping and physical activity as approved by physician    MONITORING/ EVALUATION/ PLAN:   Pt verbalized understanding of all materials covered   Pt asked pertinent questions throughout the session - expect compliance with nutrition guidelines presented   Provided pt with contact information should questions arise prior to next visit   Will f/u with pt keenan Guaman, MMSc, RD, LD

## 2023-01-30 ENCOUNTER — OFFICE VISIT (OUTPATIENT)
Dept: FAMILY MEDICINE CLINIC | Age: 53
End: 2023-01-30
Payer: COMMERCIAL

## 2023-01-30 VITALS
TEMPERATURE: 97.8 F | DIASTOLIC BLOOD PRESSURE: 74 MMHG | WEIGHT: 259.6 LBS | BODY MASS INDEX: 42.54 KG/M2 | HEART RATE: 70 BPM | SYSTOLIC BLOOD PRESSURE: 126 MMHG | OXYGEN SATURATION: 96 % | RESPIRATION RATE: 16 BRPM

## 2023-01-30 DIAGNOSIS — E11.9 TYPE 2 DIABETES MELLITUS WITHOUT COMPLICATION, WITHOUT LONG-TERM CURRENT USE OF INSULIN (HCC): ICD-10-CM

## 2023-01-30 DIAGNOSIS — E78.2 MIXED HYPERLIPIDEMIA: ICD-10-CM

## 2023-01-30 DIAGNOSIS — I10 ESSENTIAL HYPERTENSION: ICD-10-CM

## 2023-01-30 DIAGNOSIS — F41.9 ANXIETY: ICD-10-CM

## 2023-01-30 DIAGNOSIS — J34.89 SINUS PAIN: ICD-10-CM

## 2023-01-30 DIAGNOSIS — J01.41 ACUTE RECURRENT PANSINUSITIS: ICD-10-CM

## 2023-01-30 DIAGNOSIS — E11.9 TYPE 2 DIABETES MELLITUS WITHOUT COMPLICATION, WITHOUT LONG-TERM CURRENT USE OF INSULIN (HCC): Primary | ICD-10-CM

## 2023-01-30 DIAGNOSIS — J30.81 ALLERGIC RHINITIS DUE TO ANIMAL HAIR AND DANDER: ICD-10-CM

## 2023-01-30 DIAGNOSIS — E66.01 CLASS 3 SEVERE OBESITY DUE TO EXCESS CALORIES WITHOUT SERIOUS COMORBIDITY WITH BODY MASS INDEX (BMI) OF 40.0 TO 44.9 IN ADULT (HCC): ICD-10-CM

## 2023-01-30 LAB — HBA1C MFR BLD: 7.5 %

## 2023-01-30 PROCEDURE — 3078F DIAST BP <80 MM HG: CPT | Performed by: NURSE PRACTITIONER

## 2023-01-30 PROCEDURE — 3051F HG A1C>EQUAL 7.0%<8.0%: CPT | Performed by: NURSE PRACTITIONER

## 2023-01-30 PROCEDURE — 83036 HEMOGLOBIN GLYCOSYLATED A1C: CPT | Performed by: NURSE PRACTITIONER

## 2023-01-30 PROCEDURE — 99214 OFFICE O/P EST MOD 30 MIN: CPT | Performed by: NURSE PRACTITIONER

## 2023-01-30 PROCEDURE — 3074F SYST BP LT 130 MM HG: CPT | Performed by: NURSE PRACTITIONER

## 2023-01-30 RX ORDER — CEFDINIR 300 MG/1
300 CAPSULE ORAL 2 TIMES DAILY
Qty: 14 CAPSULE | Refills: 0 | Status: SHIPPED | OUTPATIENT
Start: 2023-01-30 | End: 2023-02-06

## 2023-01-30 RX ORDER — ATORVASTATIN CALCIUM 20 MG/1
20 TABLET, FILM COATED ORAL DAILY
Qty: 90 TABLET | Refills: 3 | Status: SHIPPED | OUTPATIENT
Start: 2023-01-30

## 2023-01-30 RX ORDER — VENLAFAXINE HYDROCHLORIDE 150 MG/1
150 CAPSULE, EXTENDED RELEASE ORAL DAILY
Qty: 90 CAPSULE | Refills: 1 | Status: SHIPPED | OUTPATIENT
Start: 2023-01-30

## 2023-01-30 RX ORDER — FLUTICASONE PROPIONATE 50 MCG
1 SPRAY, SUSPENSION (ML) NASAL DAILY
Qty: 3 EACH | Refills: 1 | Status: SHIPPED | OUTPATIENT
Start: 2023-01-30

## 2023-01-30 RX ORDER — HYDROCHLOROTHIAZIDE 50 MG/1
50 TABLET ORAL DAILY
Qty: 90 TABLET | Refills: 1 | Status: SHIPPED | OUTPATIENT
Start: 2023-01-30

## 2023-01-30 RX ORDER — FAMOTIDINE 20 MG/1
20 TABLET, FILM COATED ORAL NIGHTLY
Qty: 180 TABLET | Refills: 1 | Status: SHIPPED | OUTPATIENT
Start: 2023-01-30

## 2023-01-30 RX ORDER — CETIRIZINE HYDROCHLORIDE 10 MG/1
10 TABLET ORAL DAILY
Qty: 90 TABLET | Refills: 1 | Status: SHIPPED | OUTPATIENT
Start: 2023-01-30

## 2023-01-30 RX ORDER — BUSPIRONE HYDROCHLORIDE 7.5 MG/1
7.5 TABLET ORAL 2 TIMES DAILY
Qty: 180 TABLET | Refills: 1 | Status: SHIPPED | OUTPATIENT
Start: 2023-01-30

## 2023-01-30 RX ORDER — DOCUSATE SODIUM 100 MG/1
100 CAPSULE, LIQUID FILLED ORAL DAILY
Qty: 90 CAPSULE | Refills: 1 | Status: SHIPPED | OUTPATIENT
Start: 2023-01-30

## 2023-01-30 ASSESSMENT — ENCOUNTER SYMPTOMS
SHORTNESS OF BREATH: 0
DIARRHEA: 0
CHEST TIGHTNESS: 0
NAUSEA: 0
CONSTIPATION: 0
ABDOMINAL PAIN: 0
VOMITING: 0
WHEEZING: 0
COUGH: 0

## 2023-01-30 ASSESSMENT — ANXIETY QUESTIONNAIRES
1. FEELING NERVOUS, ANXIOUS, OR ON EDGE: 0
GAD7 TOTAL SCORE: 0
4. TROUBLE RELAXING: 0
6. BECOMING EASILY ANNOYED OR IRRITABLE: 0
5. BEING SO RESTLESS THAT IT IS HARD TO SIT STILL: 0
2. NOT BEING ABLE TO STOP OR CONTROL WORRYING: 0
7. FEELING AFRAID AS IF SOMETHING AWFUL MIGHT HAPPEN: 0
3. WORRYING TOO MUCH ABOUT DIFFERENT THINGS: 0

## 2023-01-30 ASSESSMENT — PATIENT HEALTH QUESTIONNAIRE - PHQ9
SUM OF ALL RESPONSES TO PHQ QUESTIONS 1-9: 0
2. FEELING DOWN, DEPRESSED OR HOPELESS: 0
SUM OF ALL RESPONSES TO PHQ QUESTIONS 1-9: 0
1. LITTLE INTEREST OR PLEASURE IN DOING THINGS: 0
SUM OF ALL RESPONSES TO PHQ QUESTIONS 1-9: 0
SUM OF ALL RESPONSES TO PHQ QUESTIONS 1-9: 0
SUM OF ALL RESPONSES TO PHQ9 QUESTIONS 1 & 2: 0

## 2023-01-30 NOTE — ASSESSMENT & PLAN NOTE
She is allergic to her cat who is 12years old. She was on zyrtec from walk in clinic and felt it helped her symptoms while taking it. She has had ongoing sinus issues since December. She had an antibiotic with unclear relief because she contracted covid soon after. Complains of sinus pain/pressure. She has only been using flonase PRN.

## 2023-01-30 NOTE — ASSESSMENT & PLAN NOTE
Mood overall okay. Worried about her brother who is currently hospitalized.    THERESE-7 SCREENING 1/30/2023 9/29/2022 1/27/2021   Feeling nervous, anxious, or on edge Not at all Not at all -   Not being able to stop or control worrying Not at all Not at all -   Worrying too much about different things Not at all Not at all -   Trouble relaxing Not at all Several days -   Being so restless that it is hard to sit still Not at all Several days -   Becoming easily annoyed or irritable Not at all Several days -   Feeling afraid as if something awful might happen Not at all Not at all -   THERESE-7 Total Score 0 3 -   Feeling nervous, anxious, or on edge - - 1-Several days   Not able to stop or control worrying - - 1-Several days   Worrying too much about different things - - 2-Over half the days   Trouble relaxing - - 2-Over half the days   Being so restless that it's hard to sit still - - 1-Several days   Becoming easily annoyed or irritable - - 1-Several days   Feeling afraid as if something awful might happen - - 1-Several days   THERESE-7 Total Score - - 9

## 2023-01-30 NOTE — ASSESSMENT & PLAN NOTE
She started to see weight management and was started on adipex. Prior to this admits to eating junk in the evening. Attempting dietary changes. Not currently exercising. Time-based billing (NON-critical care)

## 2023-01-30 NOTE — ASSESSMENT & PLAN NOTE
A1c up slightly. Not exercising--has an active job. Occasional polydipsia. Denies polyphagia, polyuria.

## 2023-01-30 NOTE — PROGRESS NOTES
SUBJECTIVE:    Nicole Maxwell  1970  45 y.o.  female      Chief Complaint   Patient presents with    Follow-up     Follow up . Having a lot of sinus issues     Flu Vaccine     Already hd flu vaccine     HPI    Allergies   Allergen Reactions    Pcn [Penicillins] Hives     Any derivative       Current Outpatient Medications   Medication Sig Dispense Refill    venlafaxine (EFFEXOR XR) 150 MG extended release capsule Take 1 capsule by mouth daily 90 capsule 1    metFORMIN (GLUCOPHAGE) 500 MG tablet Take 1 tablet by mouth 2 times daily (with meals) 180 tablet 1    hydroCHLOROthiazide (HYDRODIURIL) 50 MG tablet Take 1 tablet by mouth daily 90 tablet 1    busPIRone (BUSPAR) 7.5 MG tablet Take 1 tablet by mouth 2 times daily 180 tablet 1    atorvastatin (LIPITOR) 20 MG tablet Take 1 tablet by mouth daily 90 tablet 3    famotidine (PEPCID) 20 MG tablet Take 1 tablet by mouth nightly 180 tablet 1    docusate sodium (COLACE) 100 MG capsule Take 1 capsule by mouth daily 90 capsule 1    cetirizine (ZYRTEC) 10 MG tablet Take 1 tablet by mouth daily 90 tablet 1    cefdinir (OMNICEF) 300 MG capsule Take 1 capsule by mouth 2 times daily for 7 days 14 capsule 0    fluticasone (FLONASE) 50 MCG/ACT nasal spray 1 spray by Each Nostril route daily 3 each 1    phentermine (ADIPEX-P) 37.5 MG tablet Take 1 tablet by mouth every morning (before breakfast) for 30 days. BMI 44. Max Daily Amount: 37.5 mg 30 tablet 0    Lactobacillus (ACIDOPHILUS) 100 MG CAPS Take by mouth daily       No current facility-administered medications for this visit.           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 [address]     Wears glasses      Past Surgical History:   Procedure Laterality Date    ARM SURGERY Right 2020    RIGHT ULNAR NERVE TRANSPOSITION performed by Clara Stark MD at [address] Right 2020    RIGHT CARPAL TUNNEL RELEASE performed by Clara Stark MD at [address] Left 10/12/2020    LEFT CARPAL TUNNEL RELEASE, LEFT GANGLION CYST EXCISION performed by Clara Stark MD at [facility]  2003    CYST REMOVAL      HAND SURGERY Right 2020    RIGHT GANGLION CYST HAND LESION BIOPSY EXCISION performed by Clara Stark MD at [address] [place] [address] ([facility])      age 50    HYSTERECTOMY, VAGINAL  2015    bilateral ovaries not removed    [facility] N/A 2019    APPENDECTOMY LAPAROSCOPIC performed by Torri Garcia MD at [address] [facility] nerve release    TONSILLECTOMY  1990s     Social History     Socioeconomic History    Marital status:      Spouse name: None    Number of children: None    Years of education: None    Highest education level: None   Tobacco Use    Smoking status: Former     Packs/day: 1.00     Years: 20.00     Pack years: 20.00     Types: Cigarettes     Quit date:      Years since quittin.0    Smokeless tobacco: Never   Vaping Use    Vaping Use: Never used   Substance and Sexual Activity    Alcohol use: No    Drug use: No    Sexual activity: Yes     Partners: Male         Essential hypertension  Stable. Patient denies any exertional chest pain, dyspnea, palpitations, syncope, orthopnea, edema or paroxysmal nocturnal dyspnea. Type 2 diabetes mellitus without complication, without long-term current use of insulin ([facility])  A1c up slightly. Not exercising--has an active job. Occasional polydipsia. Denies polyphagia, polyuria. Anxiety  Mood overall okay.  Worried about her brother who is currently hospitalized.   THERESE-7 SCREENING 1/30/2023 9/29/2022 1/27/2021   Feeling nervous, anxious, or on edge Not at all Not at all -   Not being able to stop or control worrying Not at all Not at all -   Worrying too much about different things Not at all Not at all -   Trouble relaxing Not at all Several days -   Being so restless that it is hard to sit still Not at all Several days -   Becoming easily annoyed or irritable Not at all Several days -   Feeling afraid as if something awful might happen Not at all Not at all -   THERESE-7 Total Score 0 3 -   Feeling nervous, anxious, or on edge - - 1-Several days   Not able to stop or control worrying - - 1-Several days   Worrying too much about different things - - 2-Over half the days   Trouble relaxing - - 2-Over half the days   Being so restless that it's hard to sit still - - 1-Several days   Becoming easily annoyed or irritable - - 1-Several days   Feeling afraid as if something awful might happen - - 1-Several days   THERESE-7 Total Score - - 9          Class 3 severe obesity due to excess calories without serious comorbidity with body mass index (BMI) of 40.0 to 44.9 in adult (HCC)  She started to see weight management and was started on adipex. Prior to this admits to eating junk in the evening. Attempting dietary changes. Not currently exercising.      Allergic rhinitis due to animal hair and dander  She is allergic to her cat who is 16 years old. She was on zyrtec from walk in clinic and felt it helped her symptoms while taking it.     She has had ongoing sinus issues since December. She had an antibiotic with unclear relief because she contracted covid soon after. Complains of sinus pain/pressure. She has only been using flonase PRN.         Review of Systems   Constitutional:  Negative for appetite change, chills, fatigue and unexpected weight change.   Respiratory:  Negative for cough, chest tightness, shortness of breath and wheezing.    Cardiovascular:  Negative for chest pain, palpitations and leg swelling. Gastrointestinal:  Negative for abdominal pain, constipation, diarrhea, nausea and vomiting. Endocrine: Positive for polydipsia. Negative for polyphagia and polyuria. Musculoskeletal:  Negative for arthralgias. Neurological:  Negative for weakness, numbness and headaches. Hematological:  Negative for adenopathy. OBJECTIVE:    /74 (Site: Left Upper Arm, Position: Sitting, Cuff Size: Large Adult)   Pulse 70   Temp 97.8 °F (36.6 °C) (Infrared)   Resp 16   Wt 259 lb 9.6 oz (117.8 kg)   SpO2 96%   BMI 42.54 kg/m²     Physical Exam  Constitutional:       Appearance: Normal appearance. She is well-developed. HENT:      Head: Normocephalic and atraumatic. Right Ear: Hearing, tympanic membrane, ear canal and external ear normal.      Left Ear: Hearing, tympanic membrane, ear canal and external ear normal.      Nose:      Right Sinus: Maxillary sinus tenderness and frontal sinus tenderness present. Left Sinus: Maxillary sinus tenderness and frontal sinus tenderness present. Mouth/Throat:      Mouth: Mucous membranes are moist.      Pharynx: Uvula midline. No pharyngeal swelling, oropharyngeal exudate or posterior oropharyngeal erythema. Tonsils: No tonsillar exudate. Neck:      Vascular: No carotid bruit or JVD. Cardiovascular:      Rate and Rhythm: Normal rate and regular rhythm. Heart sounds: Normal heart sounds. No murmur heard. No friction rub. No gallop. Pulmonary:      Effort: Pulmonary effort is normal. No respiratory distress. Breath sounds: Normal breath sounds. No wheezing, rhonchi or rales. Abdominal:      Palpations: Abdomen is soft. Musculoskeletal:         General: Normal range of motion. Cervical back: Normal range of motion and neck supple. Lymphadenopathy:      Cervical: No cervical adenopathy. Right cervical: No superficial or posterior cervical adenopathy.      Left cervical: No superficial or posterior cervical adenopathy. Skin:     General: Skin is warm and dry. Neurological:      General: No focal deficit present. Mental Status: She is alert and oriented to person, place, and time. Psychiatric:         Mood and Affect: Mood normal.         Behavior: Behavior normal. Behavior is cooperative. Thought Content: Thought content normal.       ASSESSMENT/PLAN:    1. Type 2 diabetes mellitus without complication, without long-term current use of insulin (HCC)  Increase metformin to twice daily. Monitor glucose. The patient is asked to make an attempt to improve diet and exercise patterns to aid in medical management of this problem. - POCT glycosylated hemoglobin (Hb A1C)  - MICROALBUMIN / CREATININE URINE RATIO; Future  - metFORMIN (GLUCOPHAGE) 500 MG tablet; Take 1 tablet by mouth 2 times daily (with meals)  Dispense: 180 tablet; Refill: 1    2. Anxiety  Continue effexor, buspar.   - venlafaxine (EFFEXOR XR) 150 MG extended release capsule; Take 1 capsule by mouth daily  Dispense: 90 capsule; Refill: 1  - busPIRone (BUSPAR) 7.5 MG tablet; Take 1 tablet by mouth 2 times daily  Dispense: 180 tablet; Refill: 1    3. Essential hypertension  Work on weight loss, increased physical activity  - hydroCHLOROthiazide (HYDRODIURIL) 50 MG tablet; Take 1 tablet by mouth daily  Dispense: 90 tablet; Refill: 1    4. Mixed hyperlipidemia  - atorvastatin (LIPITOR) 20 MG tablet; Take 1 tablet by mouth daily  Dispense: 90 tablet; Refill: 3    5. Allergic rhinitis due to animal hair and dander  Will have her start zyrtec daily to help with cat allergy   - cetirizine (ZYRTEC) 10 MG tablet; Take 1 tablet by mouth daily  Dispense: 90 tablet; Refill: 1    6. Acute recurrent pansinusitis  No improvement with doxycycline and allergic to PCN. Recommend flonase BID, nasal rinses. - cefdinir (OMNICEF) 300 MG capsule;  Take 1 capsule by mouth 2 times daily for 7 days  Dispense: 14 capsule; Refill: 0    7. Sinus pain  - fluticasone (FLONASE) 50 MCG/ACT nasal spray; 1 spray by Each Nostril route daily  Dispense: 3 each; Refill: 1    8. Class 3 severe obesity due to excess calories without serious comorbidity with body mass index (BMI) of 40.0 to 44.9 in adult [facility])  The patient is asked to make an attempt to improve diet and exercise patterns to aid in medical management of this problem. Follow up with weight management as scheduled               Return in about 4 months (around 5/30/2023), or if symptoms worsen or fail to improve, for diabetes.       (Please note that portions of this note may have been completed with a voice recognition program. Efforts were made to edit the dictations but occasionally words aremis-transcribed.)

## 2023-01-31 LAB
CREATININE URINE: 155.4 MG/DL (ref 28–259)
MICROALBUMIN UR-MCNC: <1.2 MG/DL
MICROALBUMIN/CREAT UR-RTO: NORMAL MG/G (ref 0–30)

## 2023-02-23 NOTE — DISCHARGE SUMMARY
Discharge Summary     Patient ID   Johana Arndt   1970  [id number]    Primary Care:   Linus Lockhart, APRN - CNP    Admit date: 5/31/2019   Discharge date: 6/1/2019    Medical Record number: [id number]   Admitting Physician: Hubert Fortune MD   Discharge Physician: Hubert Fortune MD    Consultants: none    Invasive procedures: Laparoscopic appendectomy    Discharge Diagnoses: lap appendectomy due to acute appendicitis     Patient Active Problem List   Diagnosis Code    Finger contusion S60.00XA    Closed fracture of phalanx or phalanges of hand S62.609A    Pre-diabetes R73.03    Essential hypertension I10    Acute appendicitis K35.80       Brief history, reason for admission:  Patient presented to the hospital with complaints of RLQ pain. Due to acute appendicitis, patient was admitted for laparoscopic appendectomy. Hospital Course:  Patient presented to ER in [place] with complaints of RLQ pain that began on Tuesday. States by Thursday, she was unable to take a deep breath without pain. CT revealed acute appendicitis and she was transferred to [facility] for lap appendectomy. She states she is feeling better this morning. Tolerated diet. Minimal pain. Did have some pain in her right shoulder however states that has improved. Physical Exam:   BP (!) 121/58   Pulse (!) 48   Temp 96 °F (35.6 °C) (Oral)   Resp 16   Ht 5' 6\" (1.676 m)   Wt 230 lb (104.3 kg)   SpO2 98%   BMI 37.12 kg/m²   Neck: no JVD  Lungs: equal BS, clear   CV: RRR, normal S1S2, no significant murmur  Abdomen: soft, normally active BS, no masses  Minimal tenderness noted. Incisions clean, dry, intact. Staples well approximated. Extremities: no edema or cords  Neurologic: alert, oriented, no focal CN or motor deficit    Significant Diagnostic Studies: radiology: CT scan:     Impression   1. Appendicitis.  No periappendiceal abscess identified   2. Normal appearing gallbladder   3.  No obstructive uropathy Outreach attempt was made to schedule a Medicare Wellness Visit. This was the first attempt. Contact was not made, left message.   wound clean and dry    Follow-up in office on Tuesday 6/11/19.     SCOOTER Marcial-CNP

## 2023-03-01 ENCOUNTER — OFFICE VISIT (OUTPATIENT)
Dept: BARIATRICS/WEIGHT MGMT | Age: 53
End: 2023-03-01
Payer: COMMERCIAL

## 2023-03-01 VITALS
BODY MASS INDEX: 39.57 KG/M2 | HEIGHT: 66 IN | HEART RATE: 88 BPM | SYSTOLIC BLOOD PRESSURE: 120 MMHG | DIASTOLIC BLOOD PRESSURE: 86 MMHG | WEIGHT: 246.2 LBS | OXYGEN SATURATION: 100 %

## 2023-03-01 DIAGNOSIS — E66.01 MORBID OBESITY WITH BMI OF 40.0-44.9, ADULT (HCC): ICD-10-CM

## 2023-03-01 DIAGNOSIS — Z79.899 MEDICATION MANAGEMENT: Primary | ICD-10-CM

## 2023-03-01 PROCEDURE — 3079F DIAST BP 80-89 MM HG: CPT | Performed by: NURSE PRACTITIONER

## 2023-03-01 PROCEDURE — 3074F SYST BP LT 130 MM HG: CPT | Performed by: NURSE PRACTITIONER

## 2023-03-01 PROCEDURE — 99213 OFFICE O/P EST LOW 20 MIN: CPT | Performed by: NURSE PRACTITIONER

## 2023-03-01 RX ORDER — PHENTERMINE HYDROCHLORIDE 37.5 MG/1
37.5 TABLET ORAL
Qty: 30 TABLET | Refills: 0 | Status: SHIPPED | OUTPATIENT
Start: 2023-03-01 | End: 2023-03-31

## 2023-03-01 ASSESSMENT — ENCOUNTER SYMPTOMS: CONSTIPATION: 1

## 2023-03-01 NOTE — PROGRESS NOTES
Chief Complaint   Patient presents with    Weight Management     Med wm adipex #2          SUBJECTIVE:    HPI: Patient is here with complaints of: Monthly Adipex visit. Obesity with a BMI of Body mass index is 40.35 kg/m². .    Current weight: 246 pounds    Weight change since last visit: -12.1 pounds (if pt failed to lose weight, cannot remain on medication). Month 2 of 3 of being on Adipex. Any new absolute contraindications (glaucoma, drug abuse, CAD, uncontrolled HTN, arrhythmias, stroke, CHF, hyperthyroidism, MAOI use, pregnant or breastfeeding) to being on medication: DENIES     Pt complains of the following side effects: DENIES    Current dietary measures: tracking calories about 1000    Current exercise measures: 15,000 steps daily at work    I have reviewed the patient's(pertinent information to this visit) medical history, family history(scanned in  the [address] [place] [address] under \"patient questioner\"), social history and review of systems with the patient today in the office.           Past Surgical History:   Procedure Laterality Date    ARM SURGERY Right 9/9/2020    RIGHT ULNAR NERVE TRANSPOSITION performed by Jordan Mayfield MD at [address] Right 9/9/2020    RIGHT CARPAL TUNNEL RELEASE performed by Jordan Mayfield MD at [address] Left 10/12/2020    LEFT CARPAL TUNNEL RELEASE, LEFT GANGLION CYST EXCISION performed by Jordan Mayfield MD at [address] [place] [address]  2003    CYST REMOVAL      HAND SURGERY Right 9/9/2020    RIGHT GANGLION CYST HAND LESION BIOPSY EXCISION performed by Jordan Mayfield MD at [address])      age 50    HYSTERECTOMY, VAGINAL  6/1/2015    bilateral ovaries not removed    [facility] N/A 5/31/2019    APPENDECTOMY LAPAROSCOPIC performed by Riki Silva MD at [address] [facility] [facility] nerve release    TONSILLECTOMY  1990s       Past Medical History: Diagnosis Date    Acid reflux     Anxiety     Depression     Diabetes mellitus (HCC)     Pre-diabetic - diet controlled - follows with PCP    Elevated WBC count     Dr. Sejal Carver disease     Hypertension     PCP\"on medication for b/p - since age mid 37's\"    Hyperthyroidism     \"\"in remission since \"    Lung nodule     Migraine     Last migraine: 2020    Type 2 diabetes mellitus without complication ([facility] [address]     Wears glasses        Family History   Problem Relation Age of Onset    Other Mother         thyroid issues    High Blood Pressure Mother     High Cholesterol Mother     Heart Disease Father     Coronary Art Dis Father     High Cholesterol Father     Other Father         Osteoarthritis    Other Maternal Grandmother         brain shunt; hydrocephalus    Heart Attack Maternal Grandfather     Alzheimer's Disease Paternal Grandmother     Heart Attack Paternal Grandfather     Stroke Paternal Grandfather     High Cholesterol Sister     Asthma Sister     Breast Cancer Maternal Aunt         post menopausal    Breast Cancer Paternal Aunt         post menopausal    Ovarian Cancer Neg Hx        Social History     Socioeconomic History    Marital status:      Spouse name: Not on file    Number of children: Not on file    Years of education: Not on file    Highest education level: Not on file   Occupational History    Not on file   Tobacco Use    Smoking status: Former     Packs/day: 1.00     Years: 20.00     Pack years: 20.00     Types: Cigarettes     Quit date:      Years since quittin.1    Smokeless tobacco: Never   Vaping Use    Vaping Use: Never used   Substance and Sexual Activity    Alcohol use: No    Drug use: No    Sexual activity: Yes     Partners: Male   Other Topics Concern    Not on file   Social History Narrative    Not on file     Social Determinants of Health     Financial Resource Strain: Not on file   Food Insecurity: Not on file   Transportation Needs: Not on file   Physical Activity: Not on file   Stress: Not on file   Social Connections: Not on file   Intimate Partner Violence: Not on file   Housing Stability: Not on file       Current Outpatient Medications   Medication Sig Dispense Refill    phentermine (ADIPEX-P) 37.5 MG tablet Take 1 tablet by mouth every morning (before breakfast) for 30 days. BMI 44. Max Daily Amount: 37.5 mg 30 tablet 0    venlafaxine (EFFEXOR XR) 150 MG extended release capsule Take 1 capsule by mouth daily 90 capsule 1    metFORMIN (GLUCOPHAGE) 500 MG tablet Take 1 tablet by mouth 2 times daily (with meals) 180 tablet 1    hydroCHLOROthiazide (HYDRODIURIL) 50 MG tablet Take 1 tablet by mouth daily 90 tablet 1    busPIRone (BUSPAR) 7.5 MG tablet Take 1 tablet by mouth 2 times daily 180 tablet 1    atorvastatin (LIPITOR) 20 MG tablet Take 1 tablet by mouth daily 90 tablet 3    famotidine (PEPCID) 20 MG tablet Take 1 tablet by mouth nightly 180 tablet 1    docusate sodium (COLACE) 100 MG capsule Take 1 capsule by mouth daily 90 capsule 1    cetirizine (ZYRTEC) 10 MG tablet Take 1 tablet by mouth daily 90 tablet 1    fluticasone (FLONASE) 50 MCG/ACT nasal spray 1 spray by Each Nostril route daily 3 each 1    Lactobacillus (ACIDOPHILUS) 100 MG CAPS Take by mouth daily (Patient not taking: Reported on 3/1/2023)       No current facility-administered medications for this visit. Allergies   Allergen Reactions    Pcn [Penicillins] Hives     Any derivative       Review of Systems:         Review of Systems   Gastrointestinal:  Positive for constipation. All other systems reviewed and are negative. OBJECTIVE:  Physical Exam:    /86   Pulse 88   Ht 5' 5.5\" (1.664 m)   Wt 246 lb 3.2 oz (111.7 kg)   SpO2 100%   BMI 40.35 kg/m²      Physical Exam  Constitutional:       Appearance: Normal appearance. She is obese. HENT:      Head: Normocephalic. Nose: Nose normal.      Mouth/Throat:      Pharynx: Oropharynx is clear.    Eyes: Conjunctiva/sclera: Conjunctivae normal.      Pupils: Pupils are equal, round, and reactive to light. Cardiovascular:      Rate and Rhythm: Normal rate. Pulses: Normal pulses. Pulmonary:      Effort: Pulmonary effort is normal.   Musculoskeletal:         General: Normal range of motion. Cervical back: Normal range of motion. Skin:     General: Skin is warm and dry. Capillary Refill: Capillary refill takes less than 2 seconds. Neurological:      General: No focal deficit present. Mental Status: She is alert and oriented to person, place, and time. Psychiatric:         Mood and Affect: Mood normal.         Behavior: Behavior normal.         ASSESSMENT:  1. Morbid obesity with BMI of 40.0-44.9, adult (HCC)  - Continue tracking calories; about  4186-6711 daily  - Continue 64 oz water daily  - Increase activity as able    2. Medication management  - Doing well; down 12.1 pounds since last visit  - c/o constipation- taking stool softner pill and miralax OTC  - Denies any other side effects  - RX refill sent      PLAN:    -Continue Adipex. RX REFILLED.    -BMI is over 30, or over 27 with weight-related risk factors.     -Pt demonstrated weight loss since last visit.    -Pt aware Adipex can only be used short term (3 months). -Pt aware no breaks in treatment allowed or must be off for 6 months.    -Pt aware that weight must be lost at each visit or medication will be discontinued.     -Pt is aware that in order to be successful, must combine Adipex with appropriate diet and exercise plan. -Pt aware must continue to meet monthly in person while on this medication. F/u in one month. -Check [place] [employer] Rx Reporting System. Any concerns: NONE Reported     - Current birth control measures include: partial hysterectomy    -If elderly or renal impairment, will use lower dose (15 mg daily) and avoid all together if GFR is less than 15.  N/A      No orders of the defined types were placed in this encounter. Orders Placed This Encounter   Medications    phentermine (ADIPEX-P) 37.5 MG tablet     Sig: Take 1 tablet by mouth every morning (before breakfast) for 30 days. BMI 44. Max Daily Amount: 37.5 mg     Dispense:  30 tablet     Refill:  0          Follow Up:  Return in about 1 month (around 4/1/2023).       Ran Bishop, SCOOTER - CNP

## 2023-03-29 ENCOUNTER — OFFICE VISIT (OUTPATIENT)
Dept: BARIATRICS/WEIGHT MGMT | Age: 53
End: 2023-03-29
Payer: COMMERCIAL

## 2023-03-29 VITALS
HEIGHT: 66 IN | OXYGEN SATURATION: 98 % | HEART RATE: 88 BPM | DIASTOLIC BLOOD PRESSURE: 80 MMHG | SYSTOLIC BLOOD PRESSURE: 126 MMHG | BODY MASS INDEX: 39.58 KG/M2 | WEIGHT: 246.3 LBS

## 2023-03-29 DIAGNOSIS — E66.01 MORBID OBESITY WITH BMI OF 40.0-44.9, ADULT (HCC): ICD-10-CM

## 2023-03-29 DIAGNOSIS — Z79.899 MEDICATION MANAGEMENT: Primary | ICD-10-CM

## 2023-03-29 PROCEDURE — 3074F SYST BP LT 130 MM HG: CPT | Performed by: NURSE PRACTITIONER

## 2023-03-29 PROCEDURE — 99213 OFFICE O/P EST LOW 20 MIN: CPT | Performed by: NURSE PRACTITIONER

## 2023-03-29 PROCEDURE — 3079F DIAST BP 80-89 MM HG: CPT | Performed by: NURSE PRACTITIONER

## 2023-03-29 RX ORDER — AZITHROMYCIN 250 MG/1
TABLET, FILM COATED ORAL
COMMUNITY
Start: 2023-03-27

## 2023-03-29 RX ORDER — PHENTERMINE HYDROCHLORIDE 37.5 MG/1
37.5 TABLET ORAL
Qty: 30 TABLET | Refills: 0 | Status: SHIPPED | OUTPATIENT
Start: 2023-03-29 | End: 2023-04-28

## 2023-03-29 ASSESSMENT — PATIENT HEALTH QUESTIONNAIRE - PHQ9
SUM OF ALL RESPONSES TO PHQ9 QUESTIONS 1 & 2: 0
SUM OF ALL RESPONSES TO PHQ QUESTIONS 1-9: 0
1. LITTLE INTEREST OR PLEASURE IN DOING THINGS: 0
2. FEELING DOWN, DEPRESSED OR HOPELESS: 0
SUM OF ALL RESPONSES TO PHQ QUESTIONS 1-9: 0

## 2023-03-29 NOTE — PROGRESS NOTES
months.    -Pt aware that weight must be lost at each visit or medication will be discontinued.     -Pt is aware that in order to be successful, must combine Adipex with appropriate diet and exercise plan. -Pt aware must continue to meet monthly in person while on this medication. F/u in one month. -Check [place] [employer] Rx Reporting System. Any concerns: NONE Reported     - Current birth control measures include: partial hysterectomy    -If elderly or renal impairment, will use lower dose (15 mg daily) and avoid all together if GFR is less than 15. N/A      No orders of the defined types were placed in this encounter. Orders Placed This Encounter   Medications    phentermine (ADIPEX-P) 37.5 MG tablet     Sig: Take 1 tablet by mouth every morning (before breakfast) for 30 days. BMI 44. Max Daily Amount: 37.5 mg     Dispense:  30 tablet     Refill:  0        Follow Up:  Return in about 1 month (around 4/29/2023).       Felipa Thomas, SCOOTER - CNP

## 2023-05-22 ENCOUNTER — OFFICE VISIT (OUTPATIENT)
Dept: FAMILY MEDICINE CLINIC | Age: 53
End: 2023-05-22
Payer: COMMERCIAL

## 2023-05-22 VITALS
OXYGEN SATURATION: 96 % | WEIGHT: 249.4 LBS | RESPIRATION RATE: 16 BRPM | BODY MASS INDEX: 40.87 KG/M2 | HEART RATE: 68 BPM | DIASTOLIC BLOOD PRESSURE: 72 MMHG | SYSTOLIC BLOOD PRESSURE: 126 MMHG

## 2023-05-22 DIAGNOSIS — Z82.0 FAMILY HISTORY OF ALZHEIMER'S DISEASE: ICD-10-CM

## 2023-05-22 DIAGNOSIS — J34.89 SINUS PAIN: ICD-10-CM

## 2023-05-22 DIAGNOSIS — E78.2 MIXED HYPERLIPIDEMIA: ICD-10-CM

## 2023-05-22 DIAGNOSIS — E11.9 TYPE 2 DIABETES MELLITUS WITHOUT COMPLICATION, WITHOUT LONG-TERM CURRENT USE OF INSULIN (HCC): Primary | ICD-10-CM

## 2023-05-22 DIAGNOSIS — E66.01 CLASS 3 SEVERE OBESITY DUE TO EXCESS CALORIES WITHOUT SERIOUS COMORBIDITY WITH BODY MASS INDEX (BMI) OF 40.0 TO 44.9 IN ADULT (HCC): ICD-10-CM

## 2023-05-22 DIAGNOSIS — R91.8 LUNG MASS: ICD-10-CM

## 2023-05-22 DIAGNOSIS — J30.81 ALLERGIC RHINITIS DUE TO ANIMAL HAIR AND DANDER: ICD-10-CM

## 2023-05-22 DIAGNOSIS — I10 ESSENTIAL HYPERTENSION: ICD-10-CM

## 2023-05-22 DIAGNOSIS — F41.9 ANXIETY: ICD-10-CM

## 2023-05-22 DIAGNOSIS — B37.0 ORAL CANDIDIASIS: ICD-10-CM

## 2023-05-22 LAB — HBA1C MFR BLD: 6.6 %

## 2023-05-22 PROCEDURE — 3044F HG A1C LEVEL LT 7.0%: CPT | Performed by: NURSE PRACTITIONER

## 2023-05-22 PROCEDURE — 83036 HEMOGLOBIN GLYCOSYLATED A1C: CPT | Performed by: NURSE PRACTITIONER

## 2023-05-22 PROCEDURE — 3074F SYST BP LT 130 MM HG: CPT | Performed by: NURSE PRACTITIONER

## 2023-05-22 PROCEDURE — 82044 UR ALBUMIN SEMIQUANTITATIVE: CPT | Performed by: NURSE PRACTITIONER

## 2023-05-22 PROCEDURE — 3078F DIAST BP <80 MM HG: CPT | Performed by: NURSE PRACTITIONER

## 2023-05-22 PROCEDURE — 99214 OFFICE O/P EST MOD 30 MIN: CPT | Performed by: NURSE PRACTITIONER

## 2023-05-22 RX ORDER — NYSTATIN 100000 [USP'U]/G
POWDER TOPICAL 3 TIMES DAILY
Qty: 60 G | Refills: 1 | Status: SHIPPED | OUTPATIENT
Start: 2023-05-22

## 2023-05-22 RX ORDER — BUSPIRONE HYDROCHLORIDE 7.5 MG/1
7.5 TABLET ORAL 2 TIMES DAILY
Qty: 180 TABLET | Refills: 1 | Status: SHIPPED | OUTPATIENT
Start: 2023-05-22

## 2023-05-22 RX ORDER — ATORVASTATIN CALCIUM 20 MG/1
20 TABLET, FILM COATED ORAL DAILY
Qty: 90 TABLET | Refills: 3 | Status: SHIPPED | OUTPATIENT
Start: 2023-05-22

## 2023-05-22 RX ORDER — VENLAFAXINE HYDROCHLORIDE 150 MG/1
150 CAPSULE, EXTENDED RELEASE ORAL DAILY
Qty: 90 CAPSULE | Refills: 1 | Status: SHIPPED | OUTPATIENT
Start: 2023-05-22

## 2023-05-22 RX ORDER — HYDROCHLOROTHIAZIDE 50 MG/1
50 TABLET ORAL DAILY
Qty: 90 TABLET | Refills: 1 | Status: SHIPPED | OUTPATIENT
Start: 2023-05-22

## 2023-05-22 RX ORDER — FLUTICASONE PROPIONATE 50 MCG
1 SPRAY, SUSPENSION (ML) NASAL DAILY
Qty: 3 EACH | Refills: 1 | Status: SHIPPED | OUTPATIENT
Start: 2023-05-22

## 2023-05-22 RX ORDER — CETIRIZINE HYDROCHLORIDE 10 MG/1
10 TABLET ORAL DAILY
Qty: 90 TABLET | Refills: 1 | Status: SHIPPED | OUTPATIENT
Start: 2023-05-22

## 2023-05-22 SDOH — ECONOMIC STABILITY: INCOME INSECURITY: HOW HARD IS IT FOR YOU TO PAY FOR THE VERY BASICS LIKE FOOD, HOUSING, MEDICAL CARE, AND HEATING?: NOT HARD AT ALL

## 2023-05-22 SDOH — ECONOMIC STABILITY: HOUSING INSECURITY
IN THE LAST 12 MONTHS, WAS THERE A TIME WHEN YOU DID NOT HAVE A STEADY PLACE TO SLEEP OR SLEPT IN A SHELTER (INCLUDING NOW)?: NO

## 2023-05-22 SDOH — ECONOMIC STABILITY: FOOD INSECURITY: WITHIN THE PAST 12 MONTHS, YOU WORRIED THAT YOUR FOOD WOULD RUN OUT BEFORE YOU GOT MONEY TO BUY MORE.: NEVER TRUE

## 2023-05-22 SDOH — ECONOMIC STABILITY: FOOD INSECURITY: WITHIN THE PAST 12 MONTHS, THE FOOD YOU BOUGHT JUST DIDN'T LAST AND YOU DIDN'T HAVE MONEY TO GET MORE.: NEVER TRUE

## 2023-05-22 ASSESSMENT — PATIENT HEALTH QUESTIONNAIRE - PHQ9
SUM OF ALL RESPONSES TO PHQ QUESTIONS 1-9: 0
SUM OF ALL RESPONSES TO PHQ9 QUESTIONS 1 & 2: 0
2. FEELING DOWN, DEPRESSED OR HOPELESS: 0
SUM OF ALL RESPONSES TO PHQ QUESTIONS 1-9: 0
1. LITTLE INTEREST OR PLEASURE IN DOING THINGS: 0
SUM OF ALL RESPONSES TO PHQ QUESTIONS 1-9: 0
SUM OF ALL RESPONSES TO PHQ QUESTIONS 1-9: 0

## 2023-05-22 ASSESSMENT — ENCOUNTER SYMPTOMS
NAUSEA: 0
WHEEZING: 0
COUGH: 0
DIARRHEA: 0
VOMITING: 0
CONSTIPATION: 0
ABDOMINAL PAIN: 0
CHEST TIGHTNESS: 0
SHORTNESS OF BREATH: 0

## 2023-05-22 NOTE — PROGRESS NOTES
SUBJECTIVE:    Dolores Evans  1970  45 y.o.  female      Chief Complaint   Patient presents with    Diabetes     Follow up     Has been getting dry mouth and bumps on tongue. HPI    Allergies   Allergen Reactions    Pcn [Penicillins] Hives     Any derivative       Current Outpatient Medications   Medication Sig Dispense Refill    venlafaxine (EFFEXOR XR) 150 MG extended release capsule Take 1 capsule by mouth daily 90 capsule 1    metFORMIN (GLUCOPHAGE) 500 MG tablet Take 1 tablet by mouth 2 times daily (with meals) 180 tablet 1    hydroCHLOROthiazide (HYDRODIURIL) 50 MG tablet Take 1 tablet by mouth daily 90 tablet 1    fluticasone (FLONASE) 50 MCG/ACT nasal spray 1 spray by Each Nostril route daily 3 each 1    cetirizine (ZYRTEC) 10 MG tablet Take 1 tablet by mouth daily 90 tablet 1    busPIRone (BUSPAR) 7.5 MG tablet Take 1 tablet by mouth 2 times daily 180 tablet 1    atorvastatin (LIPITOR) 20 MG tablet Take 1 tablet by mouth daily 90 tablet 3    nystatin (MYCOSTATIN) 236402 UNIT/ML suspension Take 5 mLs by mouth 4 times daily for 10 days Retain in mouth as long as possible 200 mL 0    nystatin (MYCOSTATIN) 168690 UNIT/GM powder Apply topically 3 times daily Apply 3 times daily. 60 g 1    Ibuprofen (ADVIL PO) Take by mouth      famotidine (PEPCID) 20 MG tablet Take 1 tablet by mouth nightly 180 tablet 1    docusate sodium (COLACE) 100 MG capsule Take 1 capsule by mouth daily 90 capsule 1     No current facility-administered medications for this visit.           Past Medical History:   Diagnosis Date    Acid reflux     Anxiety     Depression     [facility]     Pre-diabetic - diet controlled - follows with PCP    Elevated WBC count     Dr. Genie Dominguez disease     Hypertension     PCP\"on medication for b/p - since age mid 37's\"    Hyperthyroidism     \"\"in remission since 2003\"    Lung nodule     Migraine     Last migraine: 8/2020    Type 2 diabetes mellitus without complication ([facility]

## 2023-05-22 NOTE — ASSESSMENT & PLAN NOTE
She was supposed to follow-up with cardiothoracic 1 last time, but her specialist is no longer in network for her. She was supposed to have 1 more CT scan, but the order has now .

## 2023-05-22 NOTE — ASSESSMENT & PLAN NOTE
She was seen weight management. She was on Adipex for 3 months. Lost weight the first month and then plateaued. Her weight was down a total of 18 pounds, but she is back up to 8 pounds. Knows that she needs to make changes such as portion control change the types of food she is eating. No formal exercise. She does plan to weigh in with the dietitian.

## 2023-05-22 NOTE — ASSESSMENT & PLAN NOTE
She was seen weight management and is down 10 pounds from her last appointment. States that she was down more and has gained some back. She denies polyuria, polydipsia, polyphagia. No formal exercise. Not checking blood sugar. A1c is down to 6.6.

## 2023-09-19 ENCOUNTER — TELEPHONE (OUTPATIENT)
Dept: FAMILY MEDICINE CLINIC | Age: 53
End: 2023-09-19

## 2023-09-19 NOTE — TELEPHONE ENCOUNTER
----- Message from Sadia Horacio sent at 9/19/2023  1:34 PM EDT -----  Subject: Appointment Request    Reason for Call: New Patient/New to Provider Appointment needed: Routine   Physical Exam    QUESTIONS    Reason for appointment request? No appointments available during search     Additional Information for Provider? Patient is calling in and she is   needing to have a well physical appointment before the 09/30, and she is   wanting a morning appointment. Please call her back to schedule.  Thank   you.   ---------------------------------------------------------------------------  --------------  Nidhi IRWIN  [phone]; OK to leave message on voicemail  ---------------------------------------------------------------------------  --------------  SCRIPT ANSWERS

## 2023-09-19 NOTE — TELEPHONE ENCOUNTER
Called spoke to patient informed her Arcadio Leyden no longer here. Gave her the number to [facility].

## 2023-09-27 LAB
CHOLEST SERPL-MCNC: 203 MG/DL
GLUCOSE SERPL-MCNC: 143 MG/DL (ref 70–99)
HDLC SERPL-MCNC: 47 MG/DL
LDLC SERPL CALC-MCNC: 126 MG/DL
PATIENT FASTING?: YES
TRIGL SERPL-MCNC: 148 MG/DL

## 2023-11-10 ENCOUNTER — TELEPHONE (OUTPATIENT)
Dept: FAMILY MEDICINE CLINIC | Age: 53
End: 2023-11-10

## 2023-11-10 NOTE — TELEPHONE ENCOUNTER
Attempted to return patients call and assist with setting up a new patient appointment with one of the providers in our office. No answer, message left for patient to contact office.

## 2023-11-10 NOTE — TELEPHONE ENCOUNTER
----- Message from Gio Alexandra sent at 11/9/2023 12:59 PM EST -----  Subject: Appointment Request    Reason for Call: New Patient/New to Provider Appointment needed: New   Patient Request Appointment    QUESTIONS    Reason for appointment request? No appointments available during search     Additional Information for Provider? Patient and her family were   established with [facility] and are now looking for new primary care.    Please call to schedule, patient needs medication refills and follows.   ---------------------------------------------------------------------------  --------------  Aubrie Gibbons [facility]  [phone]; OK to leave message on voicemail  ---------------------------------------------------------------------------  --------------  SCRIPT ANSWERS

## 2023-12-01 NOTE — TELEPHONE ENCOUNTER
[facility]    SUBJECTIVE  Jame Ochoa is a 52 y.o. female currently identified being enrolled in the [address] [place] [address] diabetes program.  All patients will be connected with a personalized health  through the [employer] chao and eligible to receive up to 6 months of Dexcom G7 system for free. By participating in the  program, the patient will:  Create daily habits that improve life and flourishing  Have access to personalized content, insights, and their own certified diabetes educator   Earn rewards for feedback    This patient is also eligible to receive up to a 6 month supply of [employer] [facility] [employer] for 0$ with a prescription. Prescription orders will be pended for:  Dexcom G7 Sensors: Qty-9 with 1 refill    Unclear who her PCP is. Old PCP left the practice, and she has not yet established care with anyone else. Will reach out to pt    LVM asking for call back to [phone] option 3. Agnes also sent    Thank you  LEONORA Dozier, PharmD,  [facility]  Department, toll free: [phone]

## 2023-12-13 RX ORDER — ACYCLOVIR 400 MG/1
TABLET ORAL
Qty: 9 EACH | Refills: 1 | Status: SHIPPED | OUTPATIENT
Start: 2023-12-13

## 2023-12-13 NOTE — TELEPHONE ENCOUNTER
Dr. Guzman Alexander,     Patient is a [facility]AB[facility] employee or spouse participating in a diabetes management/wellness program through [facility] employee wellness platform). Your patient is eligible to receive up to a 6 month free trial of the [address]. Prescription order pended for your convenience. Please feel free to reach out with any questions or concerns. She is set to see you on 1/25/24    Thank you,  LEONORA Jiménez PharmD,  [facility], toll free: [phone]    =======================================================================    [address] [employer] [facility] Program    NICOLAS Martin is a 52 y.o. female currently identified being enrolled in the [address] [place] [address] diabetes program.  All patients will be connected with a personalized health  through the [facility] chao and eligible to receive up to 6 months of Dexcom G7 system for free. By participating in the  program, the patient will:  Create daily habits that improve life and flourishing  Have access to personalized content, insights, and their own certified diabetes educator   Earn rewards for feedback    This patient is also eligible to receive up to a 6 month supply of [address] [place] [address] for 0$ with a prescription. Prescription orders will be pended for:  Dexcom G7 Sensors: Qty-9 with 1 refill    Thank you  LEONORA Jiménez, PharmD,  [facility], toll free: [phone]

## 2023-12-13 NOTE — TELEPHONE ENCOUNTER
Pt set appt up on 1/25/24 with new PCP to establish care. Will attempt to get prescription.     Natalee Alanis, PharmD,  [facility], toll free: [phone]

## 2024-01-25 ENCOUNTER — OFFICE VISIT (OUTPATIENT)
Dept: INTERNAL MEDICINE CLINIC | Age: 54
End: 2024-01-25
Payer: COMMERCIAL

## 2024-01-25 VITALS
WEIGHT: 266.8 LBS | DIASTOLIC BLOOD PRESSURE: 80 MMHG | SYSTOLIC BLOOD PRESSURE: 136 MMHG | HEIGHT: 65 IN | TEMPERATURE: 97.4 F | RESPIRATION RATE: 16 BRPM | BODY MASS INDEX: 44.45 KG/M2 | HEART RATE: 66 BPM | OXYGEN SATURATION: 97 %

## 2024-01-25 DIAGNOSIS — K21.9 GASTROESOPHAGEAL REFLUX DISEASE WITHOUT ESOPHAGITIS: ICD-10-CM

## 2024-01-25 DIAGNOSIS — J30.81 ALLERGIC RHINITIS DUE TO ANIMAL HAIR AND DANDER: ICD-10-CM

## 2024-01-25 DIAGNOSIS — R91.8 LUNG MASS: ICD-10-CM

## 2024-01-25 DIAGNOSIS — Z86.39 H/O HYPERTHYROIDISM: ICD-10-CM

## 2024-01-25 DIAGNOSIS — E66.01 CLASS 3 SEVERE OBESITY DUE TO EXCESS CALORIES WITHOUT SERIOUS COMORBIDITY WITH BODY MASS INDEX (BMI) OF 40.0 TO 44.9 IN ADULT (HCC): ICD-10-CM

## 2024-01-25 DIAGNOSIS — F41.9 ANXIETY AND DEPRESSION: ICD-10-CM

## 2024-01-25 DIAGNOSIS — E78.2 MIXED HYPERLIPIDEMIA: ICD-10-CM

## 2024-01-25 DIAGNOSIS — F41.9 ANXIETY: ICD-10-CM

## 2024-01-25 DIAGNOSIS — E11.9 TYPE 2 DIABETES MELLITUS WITHOUT COMPLICATION, WITHOUT LONG-TERM CURRENT USE OF INSULIN (HCC): Primary | ICD-10-CM

## 2024-01-25 DIAGNOSIS — I10 ESSENTIAL HYPERTENSION: ICD-10-CM

## 2024-01-25 DIAGNOSIS — F32.A ANXIETY AND DEPRESSION: ICD-10-CM

## 2024-01-25 DIAGNOSIS — Z87.891 FORMER SMOKER: ICD-10-CM

## 2024-01-25 DIAGNOSIS — E66.01 OBESITY, CLASS III, BMI 40-49.9 (MORBID OBESITY) (HCC): ICD-10-CM

## 2024-01-25 LAB — HBA1C MFR BLD: 7.8 %

## 2024-01-25 PROCEDURE — 99204 OFFICE O/P NEW MOD 45 MIN: CPT | Performed by: INTERNAL MEDICINE

## 2024-01-25 PROCEDURE — 3075F SYST BP GE 130 - 139MM HG: CPT | Performed by: INTERNAL MEDICINE

## 2024-01-25 PROCEDURE — 3051F HG A1C>EQUAL 7.0%<8.0%: CPT | Performed by: INTERNAL MEDICINE

## 2024-01-25 PROCEDURE — 83036 HEMOGLOBIN GLYCOSYLATED A1C: CPT | Performed by: INTERNAL MEDICINE

## 2024-01-25 PROCEDURE — 3079F DIAST BP 80-89 MM HG: CPT | Performed by: INTERNAL MEDICINE

## 2024-01-25 RX ORDER — CETIRIZINE HYDROCHLORIDE 10 MG/1
10 TABLET ORAL DAILY
Qty: 90 TABLET | Refills: 1 | Status: SHIPPED | OUTPATIENT
Start: 2024-01-25

## 2024-01-25 RX ORDER — BUSPIRONE HYDROCHLORIDE 7.5 MG/1
7.5 TABLET ORAL 2 TIMES DAILY
Qty: 180 TABLET | Refills: 1 | Status: SHIPPED | OUTPATIENT
Start: 2024-01-25 | End: 2024-01-25 | Stop reason: SDUPTHER

## 2024-01-25 RX ORDER — HYDROCHLOROTHIAZIDE 50 MG/1
50 TABLET ORAL DAILY
Qty: 90 TABLET | Refills: 1 | Status: SHIPPED | OUTPATIENT
Start: 2024-01-25 | End: 2024-01-25 | Stop reason: SDUPTHER

## 2024-01-25 RX ORDER — ATORVASTATIN CALCIUM 20 MG/1
20 TABLET, FILM COATED ORAL DAILY
Qty: 90 TABLET | Refills: 1 | Status: SHIPPED | OUTPATIENT
Start: 2024-01-25

## 2024-01-25 RX ORDER — HYDROCHLOROTHIAZIDE 50 MG/1
50 TABLET ORAL DAILY
Qty: 30 TABLET | Refills: 0 | Status: SHIPPED | OUTPATIENT
Start: 2024-01-25

## 2024-01-25 RX ORDER — VENLAFAXINE HYDROCHLORIDE 150 MG/1
150 CAPSULE, EXTENDED RELEASE ORAL DAILY
Qty: 90 CAPSULE | Refills: 1 | Status: SHIPPED | OUTPATIENT
Start: 2024-01-25

## 2024-01-25 RX ORDER — BUSPIRONE HYDROCHLORIDE 7.5 MG/1
7.5 TABLET ORAL 2 TIMES DAILY
Qty: 60 TABLET | Refills: 0 | Status: SHIPPED | OUTPATIENT
Start: 2024-01-25

## 2024-01-25 ASSESSMENT — PATIENT HEALTH QUESTIONNAIRE - PHQ9
SUM OF ALL RESPONSES TO PHQ QUESTIONS 1-9: 2
1. LITTLE INTEREST OR PLEASURE IN DOING THINGS: 1
SUM OF ALL RESPONSES TO PHQ QUESTIONS 1-9: 2
SUM OF ALL RESPONSES TO PHQ9 QUESTIONS 1 & 2: 2
SUM OF ALL RESPONSES TO PHQ QUESTIONS 1-9: 2
SUM OF ALL RESPONSES TO PHQ QUESTIONS 1-9: 2
2. FEELING DOWN, DEPRESSED OR HOPELESS: 1

## 2024-01-25 NOTE — PROGRESS NOTES
Ana Barnes  Patient's  is 1970  Seen in office on 2024      SUBJECTIVE:  Ana pichardo 53 y.o.year old female presents today   Chief Complaint   Patient presents with    New Patient    Diabetes     A1c=7.8     New patient    Patient has a history of    Diabetes at least for the last 5 years.  She has been taking metformin 500 mg twice a day.  Blood sugars have been stable.  Hemoglobin A1c was also in good range.  No hypoglycemic symptoms.  She does see his eye doctor periodically.  She denies any dizziness.  No tingling numbness or weakness of the arms or legs.    History of hypertension and is taking hydrochlorothiazide 50 mg daily.  No chest pain.  No palpitation no dizziness no syncope or near syncope    History of hyperlipidemia and is on atorvastatin 20 mg daily.  Denies any myalgias.    History of anxiety and depression.  Patient takes BuSpar 7.5 mg twice a day and half XR  mg daily.  Anxiety and depression are in control.  She ran out of BuSpar and is using her daughter's BuSpar.    History of dyspepsia/GERD and is using Pepcid    History of lung mass.  It has been is stable :11 mm nodule in the left lower lobe  Patient had further workup on that.  Had a PET scan which was negative repeat CAT scan was stable on 2022  Patient has seen Dr. Venegas, cardiothoracic surgeon for that and he was planning to just observe it.    Patient stated more than 20 years ago she had a hypothyroidism and she does not know how she was treated but was told it is stable.  Her left eye was protruding at the time.  It has all back to normal and she does not know what treatment was given.  She did not get any radiation therapy.    She has a allergies she thought it was due to her cat.  Her cat  she still has allergies and takes Zyrtec on a daily basis and Flonase as needed    Patient has obesity.  She has gone to weight management solutions and took Adipex in the past did lose 20 pounds but did not

## 2024-01-30 PROBLEM — K21.9 GASTROESOPHAGEAL REFLUX DISEASE WITHOUT ESOPHAGITIS: Status: ACTIVE | Noted: 2024-01-30

## 2024-02-09 ENCOUNTER — HOSPITAL ENCOUNTER (OUTPATIENT)
Dept: CT IMAGING | Age: 54
Discharge: HOME | End: 2024-02-09
Payer: COMMERCIAL

## 2024-02-09 DIAGNOSIS — R91.8 LUNG MASS: ICD-10-CM

## 2024-02-09 PROCEDURE — 71250 CT THORAX DX C-: CPT

## 2024-02-16 NOTE — PROGRESS NOTES
1720  Pt resting quietly on cart w/o distress. States is feeling ready to go home now. Rt fingers remain pink and warm but pt states that they are still numb. Sling remains in place to Rt arm. Ice packs in place and refilled for use at home. 1725 IV dc'd and pt given clothes to get dressed and calling her sister for ride. Given snack of pudding and applesauce per request.  5966. Pt is dressed and ready--awaiting arrival of ride. [address]  Pt instructed for discharge care and follow-up and use of Rx with understanding voiced. 1845 To car at exit per wheelchair. inadequate oral intake

## 2024-02-22 PROBLEM — K76.0 FATTY LIVER: Status: ACTIVE | Noted: 2024-02-22

## 2024-08-14 ENCOUNTER — HOSPITAL ENCOUNTER (OUTPATIENT)
Age: 54
Discharge: HOME | End: 2024-08-14
Payer: COMMERCIAL

## 2024-08-14 DIAGNOSIS — E78.2 MIXED HYPERLIPIDEMIA: ICD-10-CM

## 2024-08-14 DIAGNOSIS — Z86.39 H/O HYPERTHYROIDISM: ICD-10-CM

## 2024-08-14 DIAGNOSIS — E11.9 TYPE 2 DIABETES MELLITUS WITHOUT COMPLICATION, WITHOUT LONG-TERM CURRENT USE OF INSULIN (HCC): ICD-10-CM

## 2024-08-14 LAB
ALBUMIN SERPL-MCNC: 3.8 GM/DL (ref 3.4–5)
ALP BLD-CCNC: 168 IU/L (ref 40–129)
ALT SERPL-CCNC: 23 U/L (ref 10–40)
ANION GAP SERPL CALCULATED.3IONS-SCNC: 15 MMOL/L (ref 7–16)
AST SERPL-CCNC: 23 IU/L (ref 15–37)
BASOPHILS ABSOLUTE: 0.1 K/CU MM
BASOPHILS RELATIVE PERCENT: 0.7 % (ref 0–1)
BILIRUB SERPL-MCNC: 0.3 MG/DL (ref 0–1)
BUN SERPL-MCNC: 23 MG/DL (ref 6–23)
CALCIUM SERPL-MCNC: 9.1 MG/DL (ref 8.3–10.6)
CHLORIDE BLD-SCNC: 98 MMOL/L (ref 99–110)
CHOLESTEROL, FASTING: 152 MG/DL
CO2: 25 MMOL/L (ref 21–32)
CREAT SERPL-MCNC: 0.6 MG/DL (ref 0.6–1.1)
DIFFERENTIAL TYPE: ABNORMAL
EOSINOPHILS ABSOLUTE: 0.5 K/CU MM
EOSINOPHILS RELATIVE PERCENT: 5.1 % (ref 0–3)
GFR, ESTIMATED: >90 ML/MIN/1.73M2
GLUCOSE SERPL-MCNC: 196 MG/DL (ref 70–99)
HCT VFR BLD CALC: 43.3 % (ref 37–47)
HDLC SERPL-MCNC: 40 MG/DL
HEMOGLOBIN: 14.1 GM/DL (ref 12.5–16)
IMMATURE NEUTROPHIL %: 0.2 % (ref 0–0.43)
LDLC SERPL CALC-MCNC: 88 MG/DL
LYMPHOCYTES ABSOLUTE: 2.6 K/CU MM
LYMPHOCYTES RELATIVE PERCENT: 29.4 % (ref 24–44)
MCH RBC QN AUTO: 27.3 PG (ref 27–31)
MCHC RBC AUTO-ENTMCNC: 32.6 % (ref 32–36)
MCV RBC AUTO: 83.9 FL (ref 78–100)
MONOCYTES ABSOLUTE: 0.9 K/CU MM
MONOCYTES RELATIVE PERCENT: 10 % (ref 0–4)
NEUTROPHILS ABSOLUTE: 4.8 K/CU MM
NEUTROPHILS RELATIVE PERCENT: 54.6 % (ref 36–66)
PDW BLD-RTO: 13.2 % (ref 11.7–14.9)
PLATELET # BLD: 285 K/CU MM (ref 140–440)
PMV BLD AUTO: 9.1 FL (ref 7.5–11.1)
POTASSIUM SERPL-SCNC: 3.4 MMOL/L (ref 3.5–5.1)
RBC # BLD: 5.16 M/CU MM (ref 4.2–5.4)
SODIUM BLD-SCNC: 138 MMOL/L (ref 135–145)
T4 FREE SERPL-MCNC: 1.31 NG/DL (ref 0.9–1.8)
TOTAL IMMATURE NEUTOROPHIL: 0.02 K/CU MM
TOTAL PROTEIN: 7.4 GM/DL (ref 6.4–8.2)
TRIGLYCERIDE, FASTING: 119 MG/DL
TSH SERPL DL<=0.005 MIU/L-ACNC: 0.96 UIU/ML (ref 0.27–4.2)
WBC # BLD: 8.9 K/CU MM (ref 4–10.5)

## 2024-08-14 PROCEDURE — 84439 ASSAY OF FREE THYROXINE: CPT

## 2024-08-14 PROCEDURE — 85025 COMPLETE CBC W/AUTO DIFF WBC: CPT

## 2024-08-14 PROCEDURE — 80061 LIPID PANEL: CPT

## 2024-08-14 PROCEDURE — 36415 COLL VENOUS BLD VENIPUNCTURE: CPT

## 2024-08-14 PROCEDURE — 80053 COMPREHEN METABOLIC PANEL: CPT

## 2024-08-14 PROCEDURE — 84443 ASSAY THYROID STIM HORMONE: CPT

## 2024-08-15 ENCOUNTER — OFFICE VISIT (OUTPATIENT)
Age: 54
End: 2024-08-15
Payer: COMMERCIAL

## 2024-08-15 VITALS
RESPIRATION RATE: 16 BRPM | HEART RATE: 76 BPM | HEIGHT: 66 IN | TEMPERATURE: 98.6 F | WEIGHT: 262.2 LBS | BODY MASS INDEX: 42.14 KG/M2 | OXYGEN SATURATION: 97 % | DIASTOLIC BLOOD PRESSURE: 80 MMHG | SYSTOLIC BLOOD PRESSURE: 138 MMHG

## 2024-08-15 DIAGNOSIS — E66.01 CLASS 3 SEVERE OBESITY DUE TO EXCESS CALORIES WITHOUT SERIOUS COMORBIDITY WITH BODY MASS INDEX (BMI) OF 40.0 TO 44.9 IN ADULT (HCC): ICD-10-CM

## 2024-08-15 DIAGNOSIS — K21.9 GASTROESOPHAGEAL REFLUX DISEASE WITHOUT ESOPHAGITIS: ICD-10-CM

## 2024-08-15 DIAGNOSIS — Z87.891 FORMER SMOKER: ICD-10-CM

## 2024-08-15 DIAGNOSIS — F32.A ANXIETY AND DEPRESSION: ICD-10-CM

## 2024-08-15 DIAGNOSIS — F41.9 ANXIETY AND DEPRESSION: ICD-10-CM

## 2024-08-15 DIAGNOSIS — I10 ESSENTIAL HYPERTENSION: ICD-10-CM

## 2024-08-15 DIAGNOSIS — R91.8 LUNG MASS: ICD-10-CM

## 2024-08-15 DIAGNOSIS — E87.6 HYPOKALEMIA: ICD-10-CM

## 2024-08-15 DIAGNOSIS — Z12.31 SCREENING MAMMOGRAM FOR BREAST CANCER: ICD-10-CM

## 2024-08-15 DIAGNOSIS — K76.0 FATTY LIVER: ICD-10-CM

## 2024-08-15 DIAGNOSIS — J30.81 ALLERGIC RHINITIS DUE TO ANIMAL HAIR AND DANDER: ICD-10-CM

## 2024-08-15 DIAGNOSIS — F41.9 ANXIETY: ICD-10-CM

## 2024-08-15 DIAGNOSIS — E11.9 TYPE 2 DIABETES MELLITUS WITHOUT COMPLICATION, WITHOUT LONG-TERM CURRENT USE OF INSULIN (HCC): Primary | ICD-10-CM

## 2024-08-15 DIAGNOSIS — E78.2 MIXED HYPERLIPIDEMIA: ICD-10-CM

## 2024-08-15 LAB
CREATININE URINE POCT: NORMAL
HBA1C MFR BLD: 8.2 %
MICROALBUMIN/CREAT 24H UR: NORMAL MG/DL
MICROALBUMIN/CREAT UR-RTO: NORMAL MG/G

## 2024-08-15 PROCEDURE — 82044 UR ALBUMIN SEMIQUANTITATIVE: CPT | Performed by: INTERNAL MEDICINE

## 2024-08-15 PROCEDURE — 3079F DIAST BP 80-89 MM HG: CPT | Performed by: INTERNAL MEDICINE

## 2024-08-15 PROCEDURE — 83036 HEMOGLOBIN GLYCOSYLATED A1C: CPT | Performed by: INTERNAL MEDICINE

## 2024-08-15 PROCEDURE — 99396 PREV VISIT EST AGE 40-64: CPT | Performed by: INTERNAL MEDICINE

## 2024-08-15 PROCEDURE — 3075F SYST BP GE 130 - 139MM HG: CPT | Performed by: INTERNAL MEDICINE

## 2024-08-15 RX ORDER — POTASSIUM CHLORIDE 600 MG/1
8 TABLET, FILM COATED, EXTENDED RELEASE ORAL DAILY
Qty: 90 TABLET | Refills: 1 | Status: SHIPPED | OUTPATIENT
Start: 2024-08-15

## 2024-08-15 RX ORDER — VENLAFAXINE HYDROCHLORIDE 150 MG/1
150 CAPSULE, EXTENDED RELEASE ORAL DAILY
Qty: 90 CAPSULE | Refills: 1 | Status: SHIPPED | OUTPATIENT
Start: 2024-08-15

## 2024-08-15 RX ORDER — ATORVASTATIN CALCIUM 20 MG/1
20 TABLET, FILM COATED ORAL DAILY
Qty: 90 TABLET | Refills: 1 | Status: SHIPPED | OUTPATIENT
Start: 2024-08-15

## 2024-08-15 RX ORDER — NYSTATIN 100000 [USP'U]/G
POWDER TOPICAL 3 TIMES DAILY
Qty: 60 G | Refills: 1 | Status: SHIPPED | OUTPATIENT
Start: 2024-08-15

## 2024-08-15 RX ORDER — METFORMIN HYDROCHLORIDE 500 MG/1
1000 TABLET, EXTENDED RELEASE ORAL 2 TIMES DAILY WITH MEALS
Qty: 360 TABLET | Refills: 1 | Status: SHIPPED | OUTPATIENT
Start: 2024-08-15

## 2024-08-15 SDOH — ECONOMIC STABILITY: FOOD INSECURITY: WITHIN THE PAST 12 MONTHS, THE FOOD YOU BOUGHT JUST DIDN'T LAST AND YOU DIDN'T HAVE MONEY TO GET MORE.: OFTEN TRUE

## 2024-08-15 SDOH — ECONOMIC STABILITY: FOOD INSECURITY: WITHIN THE PAST 12 MONTHS, YOU WORRIED THAT YOUR FOOD WOULD RUN OUT BEFORE YOU GOT MONEY TO BUY MORE.: OFTEN TRUE

## 2024-08-15 SDOH — ECONOMIC STABILITY: INCOME INSECURITY: HOW HARD IS IT FOR YOU TO PAY FOR THE VERY BASICS LIKE FOOD, HOUSING, MEDICAL CARE, AND HEATING?: VERY HARD

## 2024-08-15 ASSESSMENT — PATIENT HEALTH QUESTIONNAIRE - PHQ9
1. LITTLE INTEREST OR PLEASURE IN DOING THINGS: SEVERAL DAYS
SUM OF ALL RESPONSES TO PHQ QUESTIONS 1-9: 2
SUM OF ALL RESPONSES TO PHQ QUESTIONS 1-9: 2
2. FEELING DOWN, DEPRESSED OR HOPELESS: SEVERAL DAYS
SUM OF ALL RESPONSES TO PHQ9 QUESTIONS 1 & 2: 2
SUM OF ALL RESPONSES TO PHQ QUESTIONS 1-9: 2
SUM OF ALL RESPONSES TO PHQ QUESTIONS 1-9: 2

## 2024-08-15 NOTE — PATIENT INSTRUCTIONS
[facility] Financial Resources*  (Call [employer]/211 if need more resources.)      Medical/Medication:  [employer] Financial Assistance  What they offer: Assistance with [employer] bills  Phone: 114.635.9364; [phone]    Good Rx:  What they offer: Good Rx tracks prescription drug prices and provides free drug coupons for discounts on medications.  Website: https://www.[email or web address]  NeedyMeds:  What they offer: NeedyMeds offers free information on medications and healthcare cost savings programs including prescription assistance programs, coupons, and discount programs.  Helpline: 444.865.7364  Website: https://www.[employer]rics.org  RX Assist:  What they offer: Information about free and low-cost medicine programs.  Website: https://www.rxassist.org    Utility:  [employer] : [place], [place], [facility] and [facility]  What they offer: Rent, Mortgage & utility assistance (HEAP/PIPP)  Phone Numbers: 861.778.5168   Website: https://www.Afterschool.me       Opportunities for Individual Change/OIC ([facility]):   What they offer: HEAP / PIPP / Rent Assistance / Educational Services / Training programs   How to Apply: Print application from website or  from drive thru located at [address] [facility] in [place], once you complete the application and obtain requested documents, drop back off to the drive thru.   Phone number: (598) [phone] or ([phone]  Website: www.oicofclarkco.org     [facility] Department of Job and Family Services:   What they offer: Assists in the well-being of low income households including families, individuals, seniors, and the disabled with accessing supportive services that help them maintain basic needs and achieve self-sufficiency - eligibility requirements vary according to the program. (food assistance, medical assistance, cash assistance,  assistance, PRC)  How to Apply: You can file an application in

## 2024-08-15 NOTE — PROGRESS NOTES
Ana Barnes  Patient's  is 1970  Seen in office on 8/15/2024      SUBJECTIVE:  Ana pichardo 53 y.o.year old female presents today   Chief Complaint   Patient presents with    Annual Exam    Diabetes    Results     Lab review    Medication Refill     Patient is here for annual physical examination  Patient has a history of diabetes hyperlipidemia GERD and hypertension  Patient states she is feeling well  Patient has DM. No hypoglycemia. No numbness or weakness. No dizziness.  She is not checking blood sugars at home.  Patient states she is stressed eater  Patient has hypertension. Taking medications No headaches, no chest pain, no palpitations and no dizziness.  Patient has hyperlipidemia. Taking medications. No abdominal pain, no nausea or vomiting. No myalgias.  Patient has a lung mass that has been stable.  No cough or hemoptysis.  Patient has seen Dr. Ruelas for that.  GERD symptoms are in control  Anxiety is not controlled patient is on BuSpar and Effexor    Lab results reviewed :  K 3.4, glucose is 196  Alk phosphatase is 168 : has been high for years  POCT hga1c is 8.2  Microalbumin <30 gm/g          Taking medications regularly. No side effects noted.    Review of Systems  Review of system is normal except as in HPI    OBJECTIVE: /80   Pulse 76   Temp 98.6 °F (37 °C) (Oral)   Resp 16   Ht 1.676 m (5' 6\")   Wt 118.9 kg (262 lb 3.2 oz)   SpO2 97%   BMI 42.32 kg/m²     Wt Readings from Last 3 Encounters:   08/15/24 118.9 kg (262 lb 3.2 oz)   24 121 kg (266 lb 12.8 oz)   23 113.1 kg (249 lb 6.4 oz)      GENERAL: - Alert, oriented, pleasant, in no apparent distress.    HEENT: - Conjunctiva pink, no scleral icterus. ENT clear.  NECK: -Supple.  No jugular venous distention noted. No masses felt,  CARDIOVASCULAR: - Normal S1 and S2    PULMONARY: - No respiratory distress.  No wheezes or rales.    ABDOMEN: - Soft and non-tender,no masses  ororganomegaly.  EXTREMITIES: - No cyanosis,  I have seen and evaluated the patient with the resident/NP/PA and agree with the above findings.  Nursing notes reviewed.

## 2024-08-16 ENCOUNTER — TELEPHONE (OUTPATIENT)
Age: 54
End: 2024-08-16

## 2024-08-16 NOTE — TELEPHONE ENCOUNTER
Brooke with [facility] called with a couple medication questions.     Pt has had a history of the Metformin IR but this script was sent for the XR and they wanted to verify that the change was intentional     They also need to know for the Nystatin powder what body part it is being applied to for insurance coverage purposes     Please advise     Call back # [phone] and choose option 1

## 2024-09-04 DIAGNOSIS — I10 ESSENTIAL HYPERTENSION: ICD-10-CM

## 2024-09-04 DIAGNOSIS — F41.9 ANXIETY: ICD-10-CM

## 2024-09-04 RX ORDER — HYDROCHLOROTHIAZIDE 50 MG/1
50 TABLET ORAL DAILY
Qty: 90 TABLET | Refills: 0 | Status: SHIPPED | OUTPATIENT
Start: 2024-09-04

## 2024-09-04 RX ORDER — BUSPIRONE HYDROCHLORIDE 7.5 MG/1
7.5 TABLET ORAL 2 TIMES DAILY
Qty: 180 TABLET | Refills: 0 | Status: SHIPPED | OUTPATIENT
Start: 2024-09-04 | End: 2024-12-03

## 2024-09-24 ENCOUNTER — TELEPHONE (OUTPATIENT)
Age: 54
End: 2024-09-24

## 2024-09-25 ENCOUNTER — TELEPHONE (OUTPATIENT)
Age: 54
End: 2024-09-25

## 2024-10-01 ENCOUNTER — TELEPHONE (OUTPATIENT)
Age: 54
End: 2024-10-01

## 2024-10-01 NOTE — TELEPHONE ENCOUNTER
Tried to reach patient, no answer, LM on voicemail to remind patient to call [phone] to get mammogram ordered.

## 2024-11-13 ENCOUNTER — HOSPITAL ENCOUNTER (OUTPATIENT)
Dept: MAMMOGRAPHY | Age: 54
Discharge: HOME | End: 2024-11-13
Payer: COMMERCIAL

## 2024-11-13 VITALS — HEIGHT: 66 IN | WEIGHT: 255 LBS | BODY MASS INDEX: 40.98 KG/M2

## 2024-11-13 DIAGNOSIS — Z12.31 SCREENING MAMMOGRAM FOR BREAST CANCER: ICD-10-CM

## 2024-11-13 PROCEDURE — 77063 BREAST TOMOSYNTHESIS BI: CPT

## 2024-11-14 ENCOUNTER — HOSPITAL ENCOUNTER (OUTPATIENT)
Age: 54
Discharge: HOME | End: 2024-11-14
Payer: COMMERCIAL

## 2024-11-14 DIAGNOSIS — E87.6 HYPOKALEMIA: ICD-10-CM

## 2024-11-14 LAB
ANION GAP SERPL CALCULATED.3IONS-SCNC: 13 MMOL/L (ref 4–16)
BUN SERPL-MCNC: 25 MG/DL (ref 6–23)
CALCIUM SERPL-MCNC: 9.4 MG/DL (ref 8.3–10.6)
CHLORIDE SERPL-SCNC: 101 MMOL/L (ref 99–110)
CO2 SERPL-SCNC: 26 MMOL/L (ref 21–32)
CREAT SERPL-MCNC: 0.6 MG/DL (ref 0.6–1.1)
GFR, ESTIMATED: >90 ML/MIN/1.73M2
GLUCOSE SERPL-MCNC: 168 MG/DL (ref 70–99)
POTASSIUM SERPL-SCNC: 3.8 MMOL/L (ref 3.5–5.1)
SODIUM SERPL-SCNC: 140 MMOL/L (ref 135–145)

## 2024-11-14 PROCEDURE — 80048 BASIC METABOLIC PNL TOTAL CA: CPT

## 2024-11-14 PROCEDURE — 36415 COLL VENOUS BLD VENIPUNCTURE: CPT

## 2024-11-15 ENCOUNTER — OFFICE VISIT (OUTPATIENT)
Age: 54
End: 2024-11-15
Payer: COMMERCIAL

## 2024-11-15 VITALS
OXYGEN SATURATION: 92 % | TEMPERATURE: 98.6 F | HEART RATE: 80 BPM | BODY MASS INDEX: 41.16 KG/M2 | WEIGHT: 255 LBS | SYSTOLIC BLOOD PRESSURE: 142 MMHG | DIASTOLIC BLOOD PRESSURE: 80 MMHG | RESPIRATION RATE: 16 BRPM

## 2024-11-15 DIAGNOSIS — I10 ESSENTIAL HYPERTENSION: ICD-10-CM

## 2024-11-15 DIAGNOSIS — E11.9 TYPE 2 DIABETES MELLITUS WITHOUT COMPLICATION, WITHOUT LONG-TERM CURRENT USE OF INSULIN (HCC): Primary | ICD-10-CM

## 2024-11-15 DIAGNOSIS — K76.0 FATTY LIVER: ICD-10-CM

## 2024-11-15 DIAGNOSIS — R91.8 LUNG MASS: ICD-10-CM

## 2024-11-15 DIAGNOSIS — F41.9 ANXIETY AND DEPRESSION: ICD-10-CM

## 2024-11-15 DIAGNOSIS — F32.A ANXIETY AND DEPRESSION: ICD-10-CM

## 2024-11-15 DIAGNOSIS — E66.01 CLASS 3 SEVERE OBESITY DUE TO EXCESS CALORIES WITHOUT SERIOUS COMORBIDITY WITH BODY MASS INDEX (BMI) OF 40.0 TO 44.9 IN ADULT: ICD-10-CM

## 2024-11-15 DIAGNOSIS — K21.9 GASTROESOPHAGEAL REFLUX DISEASE WITHOUT ESOPHAGITIS: ICD-10-CM

## 2024-11-15 DIAGNOSIS — E78.2 MIXED HYPERLIPIDEMIA: ICD-10-CM

## 2024-11-15 DIAGNOSIS — J30.81 ALLERGIC RHINITIS DUE TO ANIMAL HAIR AND DANDER: ICD-10-CM

## 2024-11-15 DIAGNOSIS — E66.813 CLASS 3 SEVERE OBESITY DUE TO EXCESS CALORIES WITHOUT SERIOUS COMORBIDITY WITH BODY MASS INDEX (BMI) OF 40.0 TO 44.9 IN ADULT: ICD-10-CM

## 2024-11-15 LAB — HBA1C MFR BLD: 6.9 %

## 2024-11-15 PROCEDURE — 99214 OFFICE O/P EST MOD 30 MIN: CPT | Performed by: INTERNAL MEDICINE

## 2024-11-15 PROCEDURE — 83036 HEMOGLOBIN GLYCOSYLATED A1C: CPT | Performed by: INTERNAL MEDICINE

## 2024-11-15 PROCEDURE — 3079F DIAST BP 80-89 MM HG: CPT | Performed by: INTERNAL MEDICINE

## 2024-11-15 PROCEDURE — 3044F HG A1C LEVEL LT 7.0%: CPT | Performed by: INTERNAL MEDICINE

## 2024-11-15 PROCEDURE — 3077F SYST BP >= 140 MM HG: CPT | Performed by: INTERNAL MEDICINE

## 2024-11-15 RX ORDER — LISINOPRIL 5 MG/1
5 TABLET ORAL DAILY
Qty: 90 TABLET | Refills: 1 | Status: SHIPPED | OUTPATIENT
Start: 2024-11-15

## 2024-11-29 DIAGNOSIS — F41.9 ANXIETY: ICD-10-CM

## 2024-11-29 RX ORDER — BUSPIRONE HYDROCHLORIDE 7.5 MG/1
7.5 TABLET ORAL 2 TIMES DAILY
Qty: 180 TABLET | Refills: 0 | Status: SHIPPED | OUTPATIENT
Start: 2024-11-29 | End: 2025-02-27

## 2024-12-20 ENCOUNTER — TELEPHONE (OUTPATIENT)
Age: 54
End: 2024-12-20

## 2024-12-20 NOTE — TELEPHONE ENCOUNTER
[facility] to res[facility]dule appointment.

## 2025-01-08 DIAGNOSIS — I10 ESSENTIAL HYPERTENSION: ICD-10-CM

## 2025-01-08 RX ORDER — HYDROCHLOROTHIAZIDE 50 MG/1
50 TABLET ORAL DAILY
Qty: 90 TABLET | Refills: 0 | Status: SHIPPED | OUTPATIENT
Start: 2025-01-08

## 2025-01-08 NOTE — TELEPHONE ENCOUNTER
Medication:   Requested Prescriptions     Pending Prescriptions Disp Refills    hydroCHLOROthiazide (HYDRODIURIL) 50 MG tablet 90 tablet 0     Sig: Take 1 tablet by mouth daily        Last Filled:    09/04/2024  Patient Phone Number: [phone] (home)     Last appt: 11/15/2024   Next appt: 2/17/2025    Last OARRS:        No data to display

## 2025-02-26 SDOH — ECONOMIC STABILITY: FOOD INSECURITY: WITHIN THE PAST 12 MONTHS, YOU WORRIED THAT YOUR FOOD WOULD RUN OUT BEFORE YOU GOT MONEY TO BUY MORE.: SOMETIMES TRUE

## 2025-02-26 SDOH — ECONOMIC STABILITY: INCOME INSECURITY: IN THE LAST 12 MONTHS, WAS THERE A TIME WHEN YOU WERE NOT ABLE TO PAY THE MORTGAGE OR RENT ON TIME?: YES

## 2025-02-26 SDOH — ECONOMIC STABILITY: FOOD INSECURITY: WITHIN THE PAST 12 MONTHS, THE FOOD YOU BOUGHT JUST DIDN'T LAST AND YOU DIDN'T HAVE MONEY TO GET MORE.: SOMETIMES TRUE

## 2025-02-26 SDOH — ECONOMIC STABILITY: TRANSPORTATION INSECURITY
IN THE PAST 12 MONTHS, HAS LACK OF TRANSPORTATION KEPT YOU FROM MEETINGS, WORK, OR FROM GETTING THINGS NEEDED FOR DAILY LIVING?: NO

## 2025-02-26 SDOH — ECONOMIC STABILITY: TRANSPORTATION INSECURITY
IN THE PAST 12 MONTHS, HAS THE LACK OF TRANSPORTATION KEPT YOU FROM MEDICAL APPOINTMENTS OR FROM GETTING MEDICATIONS?: NO

## 2025-02-27 ENCOUNTER — HOSPITAL ENCOUNTER (OUTPATIENT)
Age: 55
Discharge: HOME | End: 2025-02-27
Payer: COMMERCIAL

## 2025-02-27 DIAGNOSIS — E78.2 MIXED HYPERLIPIDEMIA: ICD-10-CM

## 2025-02-27 DIAGNOSIS — I10 ESSENTIAL HYPERTENSION: ICD-10-CM

## 2025-02-27 DIAGNOSIS — E11.9 TYPE 2 DIABETES MELLITUS WITHOUT COMPLICATION, WITHOUT LONG-TERM CURRENT USE OF INSULIN (HCC): ICD-10-CM

## 2025-02-27 LAB
ALBUMIN SERPL-MCNC: 3.8 G/DL (ref 3.4–5)
ALBUMIN/GLOB SERPL: 1.1 {RATIO} (ref 1.1–2.2)
ALP SERPL-CCNC: 171 U/L (ref 40–129)
ALT SERPL-CCNC: 16 U/L (ref 10–40)
ANION GAP SERPL CALCULATED.3IONS-SCNC: 12 MMOL/L (ref 4–16)
AST SERPL-CCNC: 21 U/L (ref 15–37)
BILIRUB SERPL-MCNC: 0.4 MG/DL (ref 0–1)
BUN SERPL-MCNC: 26 MG/DL (ref 6–23)
CALCIUM SERPL-MCNC: 9.4 MG/DL (ref 8.3–10.6)
CHLORIDE SERPL-SCNC: 101 MMOL/L (ref 99–110)
CHOLEST SERPL-MCNC: 143 MG/DL (ref 125–199)
CO2 SERPL-SCNC: 26 MMOL/L (ref 21–32)
CREAT SERPL-MCNC: 0.6 MG/DL (ref 0.6–1.1)
GFR, ESTIMATED: >90 ML/MIN/1.73M2
GLUCOSE SERPL-MCNC: 169 MG/DL (ref 74–99)
HDLC SERPL-MCNC: 42 MG/DL
LDLC SERPL CALC-MCNC: 80 MG/DL
POTASSIUM SERPL-SCNC: 4.1 MMOL/L (ref 3.5–5.1)
PROT SERPL-MCNC: 7.3 G/DL (ref 6.4–8.2)
SODIUM SERPL-SCNC: 139 MMOL/L (ref 135–145)
TRIGL SERPL-MCNC: 107 MG/DL

## 2025-02-27 PROCEDURE — 80053 COMPREHEN METABOLIC PANEL: CPT

## 2025-02-27 PROCEDURE — 80061 LIPID PANEL: CPT

## 2025-02-27 PROCEDURE — 36415 COLL VENOUS BLD VENIPUNCTURE: CPT

## 2025-02-28 ENCOUNTER — OFFICE VISIT (OUTPATIENT)
Age: 55
End: 2025-02-28
Payer: COMMERCIAL

## 2025-02-28 VITALS
OXYGEN SATURATION: 95 % | TEMPERATURE: 97.9 F | HEART RATE: 80 BPM | RESPIRATION RATE: 16 BRPM | WEIGHT: 244 LBS | DIASTOLIC BLOOD PRESSURE: 76 MMHG | BODY MASS INDEX: 39.38 KG/M2 | SYSTOLIC BLOOD PRESSURE: 128 MMHG

## 2025-02-28 DIAGNOSIS — E66.813 CLASS 3 SEVERE OBESITY DUE TO EXCESS CALORIES WITHOUT SERIOUS COMORBIDITY WITH BODY MASS INDEX (BMI) OF 40.0 TO 44.9 IN ADULT: ICD-10-CM

## 2025-02-28 DIAGNOSIS — E66.01 CLASS 3 SEVERE OBESITY DUE TO EXCESS CALORIES WITHOUT SERIOUS COMORBIDITY WITH BODY MASS INDEX (BMI) OF 40.0 TO 44.9 IN ADULT: ICD-10-CM

## 2025-02-28 DIAGNOSIS — E11.9 TYPE 2 DIABETES MELLITUS WITHOUT COMPLICATION, WITHOUT LONG-TERM CURRENT USE OF INSULIN (HCC): Primary | ICD-10-CM

## 2025-02-28 DIAGNOSIS — I10 ESSENTIAL HYPERTENSION: ICD-10-CM

## 2025-02-28 DIAGNOSIS — F41.9 ANXIETY: ICD-10-CM

## 2025-02-28 DIAGNOSIS — K21.9 GASTROESOPHAGEAL REFLUX DISEASE WITHOUT ESOPHAGITIS: ICD-10-CM

## 2025-02-28 DIAGNOSIS — K76.0 FATTY LIVER: ICD-10-CM

## 2025-02-28 DIAGNOSIS — F32.A ANXIETY AND DEPRESSION: ICD-10-CM

## 2025-02-28 DIAGNOSIS — J30.81 ALLERGIC RHINITIS DUE TO ANIMAL HAIR AND DANDER: ICD-10-CM

## 2025-02-28 DIAGNOSIS — F41.9 ANXIETY AND DEPRESSION: ICD-10-CM

## 2025-02-28 DIAGNOSIS — E78.2 MIXED HYPERLIPIDEMIA: ICD-10-CM

## 2025-02-28 PROCEDURE — 3074F SYST BP LT 130 MM HG: CPT | Performed by: INTERNAL MEDICINE

## 2025-02-28 PROCEDURE — 3078F DIAST BP <80 MM HG: CPT | Performed by: INTERNAL MEDICINE

## 2025-02-28 PROCEDURE — 99214 OFFICE O/P EST MOD 30 MIN: CPT | Performed by: INTERNAL MEDICINE

## 2025-02-28 RX ORDER — NYSTATIN 100000 [USP'U]/G
POWDER TOPICAL 3 TIMES DAILY
Qty: 60 G | Refills: 1 | Status: SHIPPED | OUTPATIENT
Start: 2025-02-28

## 2025-02-28 RX ORDER — BUSPIRONE HYDROCHLORIDE 7.5 MG/1
7.5 TABLET ORAL 2 TIMES DAILY
COMMUNITY
Start: 2024-12-02

## 2025-02-28 RX ORDER — FAMOTIDINE 20 MG/1
20 TABLET, FILM COATED ORAL NIGHTLY
Qty: 180 TABLET | Refills: 1 | Status: SHIPPED | OUTPATIENT
Start: 2025-02-28

## 2025-02-28 RX ORDER — ATORVASTATIN CALCIUM 20 MG/1
20 TABLET, FILM COATED ORAL DAILY
Qty: 90 TABLET | Refills: 1 | Status: SHIPPED | OUTPATIENT
Start: 2025-02-28

## 2025-02-28 RX ORDER — LISINOPRIL 5 MG/1
5 TABLET ORAL DAILY
Qty: 90 TABLET | Refills: 1 | Status: SHIPPED | OUTPATIENT
Start: 2025-02-28

## 2025-02-28 RX ORDER — HYDROCHLOROTHIAZIDE 50 MG/1
50 TABLET ORAL DAILY
Qty: 90 TABLET | Refills: 0 | Status: SHIPPED | OUTPATIENT
Start: 2025-02-28

## 2025-02-28 RX ORDER — POTASSIUM CHLORIDE 600 MG/1
8 TABLET, FILM COATED, EXTENDED RELEASE ORAL DAILY
Qty: 90 TABLET | Refills: 1 | Status: SHIPPED | OUTPATIENT
Start: 2025-02-28

## 2025-02-28 RX ORDER — METFORMIN HYDROCHLORIDE 500 MG/1
1000 TABLET, EXTENDED RELEASE ORAL 2 TIMES DAILY WITH MEALS
Qty: 360 TABLET | Refills: 1 | Status: SHIPPED | OUTPATIENT
Start: 2025-02-28

## 2025-02-28 RX ORDER — VENLAFAXINE HYDROCHLORIDE 150 MG/1
150 CAPSULE, EXTENDED RELEASE ORAL DAILY
Qty: 90 CAPSULE | Refills: 1 | Status: SHIPPED | OUTPATIENT
Start: 2025-02-28

## 2025-02-28 SDOH — ECONOMIC STABILITY: FOOD INSECURITY: WITHIN THE PAST 12 MONTHS, THE FOOD YOU BOUGHT JUST DIDN'T LAST AND YOU DIDN'T HAVE MONEY TO GET MORE.: NEVER TRUE

## 2025-02-28 SDOH — ECONOMIC STABILITY: FOOD INSECURITY: WITHIN THE PAST 12 MONTHS, YOU WORRIED THAT YOUR FOOD WOULD RUN OUT BEFORE YOU GOT MONEY TO BUY MORE.: NEVER TRUE

## 2025-02-28 ASSESSMENT — PATIENT HEALTH QUESTIONNAIRE - PHQ9
SUM OF ALL RESPONSES TO PHQ QUESTIONS 1-9: 2
SUM OF ALL RESPONSES TO PHQ9 QUESTIONS 1 & 2: 2
2. FEELING DOWN, DEPRESSED OR HOPELESS: SEVERAL DAYS
1. LITTLE INTEREST OR PLEASURE IN DOING THINGS: SEVERAL DAYS
SUM OF ALL RESPONSES TO PHQ QUESTIONS 1-9: 2

## 2025-02-28 NOTE — PROGRESS NOTES
capsule  5. Gastroesophageal reflux disease without esophagitis  Overview:  Patient taking Pepcid Patient is stable. Continue current treatment.    6. Fatty liver  Overview:  Per ultrasound of the liver in the past and CT scan of the chest.  Fatty infiltration of the liver  7. Class 3 severe obesity due to excess calories without serious comorbidity with body mass index (BMI) of 40.0 to 44.9 in adult  Overview:  Follow diet and exercise   Went to weight management solution for few months. Could not afford medications  8. Anxiety and depression  Overview:  She is on buspar 7.5 mg bid and venlafaxine   mg daily   Patient is stable. Continue current treatment.    9. Allergic rhinitis due to animal hair and dander  Overview:  Pt takes zyrtec on daily basis and fonase prn     Health Maintenance Due   Topic Date Due    Diabetic retinal exam  Never done    Hepatitis B vaccine (1 of 3 - 19+ 3-dose series) Never done    DTaP/Tdap/Td vaccine (1 - Tdap) Never done    Pneumococcal 50+ years Vaccine (1 of 2 - PCV) Never done    Shingles vaccine (1 of 2) Never done    Diabetic foot exam  09/29/2023    Colorectal Cancer Screen  11/08/2023    COVID-19 Vaccine (4 - 2024-25 season) 09/01/2024     Discussed about vaccinations.  Advised patient to get eye exam done  She has not done the Cologuard yet    Return to office in 3 months    Medications were reviewed with the patient. Continue current medications.  Appropriate prescriptions were addressed. After visit summery provided.  Follow up as directed : sooner if needed.  Questions were answered and patient verbalized understanding. Call for any problems, questions, or concerns.       Allergies   Allergen Reactions    Pcn [Penicillins] Hives     Any derivative     Current Outpatient Medications   Medication Sig Dispense Refill    busPIRone (BUSPAR) 7.5 MG tablet Take 1 tablet by mouth 2 times daily      hydroCHLOROthiazide (HYDRODIURIL) 50 MG tablet Take 1 tablet by mouth daily

## 2025-03-20 ENCOUNTER — COMMUNITY OUTREACH (OUTPATIENT)
Age: 55
End: 2025-03-20

## 2025-03-24 RX ORDER — BUSPIRONE HYDROCHLORIDE 7.5 MG/1
7.5 TABLET ORAL 2 TIMES DAILY
Qty: 60 TABLET | Refills: 1 | Status: SHIPPED | OUTPATIENT
Start: 2025-03-24

## 2025-05-29 ENCOUNTER — OFFICE VISIT (OUTPATIENT)
Age: 55
End: 2025-05-29
Payer: COMMERCIAL

## 2025-05-29 VITALS
TEMPERATURE: 98.5 F | DIASTOLIC BLOOD PRESSURE: 80 MMHG | OXYGEN SATURATION: 95 % | RESPIRATION RATE: 16 BRPM | WEIGHT: 241 LBS | HEART RATE: 76 BPM | SYSTOLIC BLOOD PRESSURE: 134 MMHG | BODY MASS INDEX: 38.9 KG/M2

## 2025-05-29 DIAGNOSIS — J30.81 ALLERGIC RHINITIS DUE TO ANIMAL HAIR AND DANDER: ICD-10-CM

## 2025-05-29 DIAGNOSIS — E66.813 CLASS 3 SEVERE OBESITY DUE TO EXCESS CALORIES WITHOUT SERIOUS COMORBIDITY WITH BODY MASS INDEX (BMI) OF 40.0 TO 44.9 IN ADULT (HCC): ICD-10-CM

## 2025-05-29 DIAGNOSIS — F32.A ANXIETY AND DEPRESSION: ICD-10-CM

## 2025-05-29 DIAGNOSIS — E78.2 MIXED HYPERLIPIDEMIA: ICD-10-CM

## 2025-05-29 DIAGNOSIS — F41.9 ANXIETY AND DEPRESSION: ICD-10-CM

## 2025-05-29 DIAGNOSIS — K76.0 FATTY LIVER: ICD-10-CM

## 2025-05-29 DIAGNOSIS — E11.9 TYPE 2 DIABETES MELLITUS WITHOUT COMPLICATION, WITHOUT LONG-TERM CURRENT USE OF INSULIN (HCC): Primary | ICD-10-CM

## 2025-05-29 DIAGNOSIS — I10 ESSENTIAL HYPERTENSION: ICD-10-CM

## 2025-05-29 DIAGNOSIS — K21.9 GASTROESOPHAGEAL REFLUX DISEASE WITHOUT ESOPHAGITIS: ICD-10-CM

## 2025-05-29 PROCEDURE — 3075F SYST BP GE 130 - 139MM HG: CPT | Performed by: INTERNAL MEDICINE

## 2025-05-29 PROCEDURE — 3078F DIAST BP <80 MM HG: CPT | Performed by: INTERNAL MEDICINE

## 2025-05-29 PROCEDURE — 99214 OFFICE O/P EST MOD 30 MIN: CPT | Performed by: INTERNAL MEDICINE

## 2025-05-29 RX ORDER — CETIRIZINE HYDROCHLORIDE 10 MG/1
10 TABLET ORAL DAILY
Qty: 90 TABLET | Refills: 1 | Status: SHIPPED | OUTPATIENT
Start: 2025-05-29

## 2025-05-29 RX ORDER — HYDROCHLOROTHIAZIDE 50 MG/1
50 TABLET ORAL DAILY
Qty: 90 TABLET | Refills: 1 | Status: SHIPPED | OUTPATIENT
Start: 2025-05-29

## 2025-05-29 RX ORDER — LISINOPRIL 5 MG/1
5 TABLET ORAL DAILY
Qty: 90 TABLET | Refills: 1 | Status: SHIPPED | OUTPATIENT
Start: 2025-05-29

## 2025-05-29 RX ORDER — BUSPIRONE HYDROCHLORIDE 7.5 MG/1
7.5 TABLET ORAL 2 TIMES DAILY
Qty: 60 TABLET | Refills: 1 | Status: SHIPPED | OUTPATIENT
Start: 2025-05-29

## 2025-05-29 NOTE — PROGRESS NOTES
Ana Barnes  Patient's  is 1970  Seen in office on 2025      SUBJECTIVE:  Ana pichardo 54 y.o.year old female presents today   Chief Complaint   Patient presents with    Diabetes     A1c= could not be done. Machine working.    Medication Refill    Wrist Pain     bilateral    Ankle Pain     bilateral     History of Present Illness  The patient is a 54-year-old female who presents for evaluation of diabetes, hyperlipidemia, hypertension, GERD, fatty liver, anxiety and depression, ganglion cysts, and health maintenance.    She has been experiencing weight loss, with a reported decrease of 3 pounds since her last visit. She does not monitor her blood glucose levels at home but maintains a balanced diet with appropriate carbohydrate intake per meal and increased protein consumption. She is currently on metformin 1000 mg twice daily.    She reports infrequent episodes of acid reflux and continues to take Pepcid. She tends to consume dinner later in the evening, which helps manage her symptoms.    Her anxiety and depression remain unstable, with stressors identified at home, particularly related to her stepson who has schizophrenia. She anticipates an improvement in her mental health following her stepson's relocation. She is currently on BuSpar 7.5 mg twice daily and venlafaxine  mg daily.    She experiences pain in both wrists and ankles, attributed to ganglion cysts. She has had ganglion cysts removed from both sides.  She has attempted Tylenol for pain management, which provides relief but does not address inflammation. She also reports arthritis, occasional shoulder pain, and finger pain. A physical therapist has suggested ulnar nerve surgery due to numbness in two fingers. She is considering a follow-up with Dr. Booth, who previously performed wrist surgeries. 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    She has lost 14 pounds since 2024, with only a

## 2025-08-11 DIAGNOSIS — F32.A ANXIETY AND DEPRESSION: ICD-10-CM

## 2025-08-11 DIAGNOSIS — F41.9 ANXIETY AND DEPRESSION: ICD-10-CM

## 2025-08-11 RX ORDER — BUSPIRONE HYDROCHLORIDE 7.5 MG/1
7.5 TABLET ORAL 2 TIMES DAILY
Qty: 60 TABLET | Refills: 1 | Status: SHIPPED | OUTPATIENT
Start: 2025-08-11

## 2025-08-21 ENCOUNTER — HOSPITAL ENCOUNTER (OUTPATIENT)
Age: 55
Discharge: HOME | End: 2025-08-21
Payer: COMMERCIAL

## 2025-08-21 DIAGNOSIS — E78.2 MIXED HYPERLIPIDEMIA: ICD-10-CM

## 2025-08-21 DIAGNOSIS — E11.9 TYPE 2 DIABETES MELLITUS WITHOUT COMPLICATION, WITHOUT LONG-TERM CURRENT USE OF INSULIN (HCC): ICD-10-CM

## 2025-08-21 LAB
ALBUMIN SERPL-MCNC: 4.1 G/DL (ref 3.4–5)
ALBUMIN/GLOB SERPL: 1.3 {RATIO}
ALP SERPL-CCNC: 156 U/L (ref 40–129)
ALT SERPL-CCNC: 18 U/L (ref 10–40)
ANION GAP SERPL CALCULATED.3IONS-SCNC: 9 MMOL/L (ref 9–17)
AST SERPL-CCNC: 25 U/L (ref 15–37)
BILIRUB SERPL-MCNC: 0.4 MG/DL (ref 0–1)
BUN SERPL-MCNC: 19 MG/DL (ref 7–20)
CALCIUM SERPL-MCNC: 9.4 MG/DL (ref 8.3–10.6)
CHLORIDE SERPL-SCNC: 101 MMOL/L (ref 99–110)
CHOLEST SERPL-MCNC: 128 MG/DL (ref 125–199)
CO2 SERPL-SCNC: 27 MMOL/L (ref 21–32)
CREAT SERPL-MCNC: 0.6 MG/DL (ref 0.6–1.1)
EST. AVERAGE GLUCOSE BLD GHB EST-MCNC: 139 MG/DL
GFR, ESTIMATED: >90 ML/MIN/1.73M2
GLUCOSE SERPL-MCNC: 126 MG/DL (ref 74–99)
HBA1C MFR BLD: 6.5 % (ref 4.2–6.3)
HDLC SERPL-MCNC: 43 MG/DL
LDLC SERPL CALC-MCNC: 69 MG/DL
POTASSIUM SERPL-SCNC: 3.9 MMOL/L (ref 3.5–5.1)
PROT SERPL-MCNC: 7.2 G/DL (ref 6.4–8.2)
SODIUM SERPL-SCNC: 138 MMOL/L (ref 136–145)
TRIGL SERPL-MCNC: 83 MG/DL

## 2025-08-21 PROCEDURE — 80061 LIPID PANEL: CPT

## 2025-08-21 PROCEDURE — 80053 COMPREHEN METABOLIC PANEL: CPT

## 2025-08-21 PROCEDURE — 83036 HEMOGLOBIN GLYCOSYLATED A1C: CPT

## 2025-08-21 PROCEDURE — 36415 COLL VENOUS BLD VENIPUNCTURE: CPT

## 2025-08-26 ENCOUNTER — OFFICE VISIT (OUTPATIENT)
Age: 55
End: 2025-08-26
Payer: COMMERCIAL

## 2025-08-26 VITALS
HEART RATE: 80 BPM | WEIGHT: 240.8 LBS | RESPIRATION RATE: 16 BRPM | TEMPERATURE: 98.3 F | SYSTOLIC BLOOD PRESSURE: 136 MMHG | DIASTOLIC BLOOD PRESSURE: 72 MMHG | OXYGEN SATURATION: 96 % | BODY MASS INDEX: 38.87 KG/M2

## 2025-08-26 DIAGNOSIS — K76.0 FATTY LIVER: ICD-10-CM

## 2025-08-26 DIAGNOSIS — R20.2 NUMBNESS AND TINGLING IN RIGHT HAND: ICD-10-CM

## 2025-08-26 DIAGNOSIS — E11.9 TYPE 2 DIABETES MELLITUS WITHOUT COMPLICATION, WITHOUT LONG-TERM CURRENT USE OF INSULIN (HCC): Primary | ICD-10-CM

## 2025-08-26 DIAGNOSIS — E78.2 MIXED HYPERLIPIDEMIA: ICD-10-CM

## 2025-08-26 DIAGNOSIS — F41.9 ANXIETY: ICD-10-CM

## 2025-08-26 DIAGNOSIS — E66.813 CLASS 3 SEVERE OBESITY DUE TO EXCESS CALORIES WITHOUT SERIOUS COMORBIDITY WITH BODY MASS INDEX (BMI) OF 40.0 TO 44.9 IN ADULT (HCC): ICD-10-CM

## 2025-08-26 DIAGNOSIS — K21.9 GASTROESOPHAGEAL REFLUX DISEASE WITHOUT ESOPHAGITIS: ICD-10-CM

## 2025-08-26 DIAGNOSIS — F41.9 ANXIETY AND DEPRESSION: ICD-10-CM

## 2025-08-26 DIAGNOSIS — I10 ESSENTIAL HYPERTENSION: ICD-10-CM

## 2025-08-26 DIAGNOSIS — R20.0 NUMBNESS AND TINGLING IN RIGHT HAND: ICD-10-CM

## 2025-08-26 DIAGNOSIS — F32.A ANXIETY AND DEPRESSION: ICD-10-CM

## 2025-08-26 PROCEDURE — 3075F SYST BP GE 130 - 139MM HG: CPT | Performed by: INTERNAL MEDICINE

## 2025-08-26 PROCEDURE — 3078F DIAST BP <80 MM HG: CPT | Performed by: INTERNAL MEDICINE

## 2025-08-26 PROCEDURE — 3044F HG A1C LEVEL LT 7.0%: CPT | Performed by: INTERNAL MEDICINE

## 2025-08-26 PROCEDURE — 99214 OFFICE O/P EST MOD 30 MIN: CPT | Performed by: INTERNAL MEDICINE

## 2025-08-26 RX ORDER — LISINOPRIL 10 MG/1
10 TABLET ORAL DAILY
Qty: 90 TABLET | Refills: 1 | Status: SHIPPED | OUTPATIENT
Start: 2025-08-26

## 2025-08-26 RX ORDER — POTASSIUM CHLORIDE 600 MG/1
8 TABLET, EXTENDED RELEASE ORAL DAILY
Qty: 90 TABLET | Refills: 1 | Status: SHIPPED | OUTPATIENT
Start: 2025-08-26

## 2025-08-26 RX ORDER — ATORVASTATIN CALCIUM 20 MG/1
20 TABLET, FILM COATED ORAL DAILY
Qty: 90 TABLET | Refills: 1 | Status: SHIPPED | OUTPATIENT
Start: 2025-08-26

## 2025-08-26 RX ORDER — VENLAFAXINE HYDROCHLORIDE 150 MG/1
150 CAPSULE, EXTENDED RELEASE ORAL DAILY
Qty: 90 CAPSULE | Refills: 1 | Status: SHIPPED | OUTPATIENT
Start: 2025-08-26

## 2025-08-26 RX ORDER — BUSPIRONE HYDROCHLORIDE 7.5 MG/1
7.5 TABLET ORAL 2 TIMES DAILY
Qty: 180 TABLET | Refills: 1 | Status: SHIPPED | OUTPATIENT
Start: 2025-08-26

## 2025-08-26 RX ORDER — METFORMIN HYDROCHLORIDE 500 MG/1
1000 TABLET, EXTENDED RELEASE ORAL 2 TIMES DAILY WITH MEALS
Qty: 360 TABLET | Refills: 1 | Status: SHIPPED | OUTPATIENT
Start: 2025-08-26

## (undated) DEVICE — SOLUTION IV 1000ML 0.9% SOD CHL FOR IRRIG PLAS CONT

## (undated) DEVICE — SUTURE ETHLN SZ 4-0 L18IN NONABSORBABLE BLK L19MM PS-2 3/8 1667H

## (undated) DEVICE — BANDAGE COMPR W2INXL5YD WHT BGE POLY COT M E WRP WV HK AND

## (undated) DEVICE — ZIMMER® STERILE DISPOSABLE TOURNIQUET CUFF WITH PLC, DUAL PORT, SINGLE BLADDER, 18 IN. (46 CM)

## (undated) DEVICE — BANDAGE,ELASTIC,ESMARK,STERILE,4"X9',LF: Brand: MEDLINE

## (undated) DEVICE — TUBING, SUCTION, 9/32" X 10', STRAIGHT: Brand: MEDLINE

## (undated) DEVICE — CUTTER ENDOSCP L340MM LIN ARTC SGL STROKE FIRING ENDOPATH

## (undated) DEVICE — DRESSING,GAUZE,XEROFORM,CURAD,1"X8",ST: Brand: CURAD

## (undated) DEVICE — TUBING, SUCTION, 3/16" X 10', STRAIGHT: Brand: MEDLINE

## (undated) DEVICE — CORD,CAUTERY,BIPOLAR,STERILE: Brand: MEDLINE

## (undated) DEVICE — SOLUTION IV IRRIG WATER 1000ML POUR BRL 2F7114

## (undated) DEVICE — GAUZE,SPONGE,4"X4",16PLY,XRAY,STRL,LF: Brand: MEDLINE

## (undated) DEVICE — COUNTER NDL 30 COUNT FOAM STRP SGL MAG

## (undated) DEVICE — SUTURE VCRL SZ 2-0 L18IN ABSRB UD CT-1 L36MM 1/2 CIR J839D

## (undated) DEVICE — GOWN,ECLIPSE,POLYRNF,BRTHSLV,L,30/CS: Brand: MEDLINE

## (undated) DEVICE — BANDAGE COMPR W4INXL5YD WHT BGE POLY COT M E WRP WV HK AND

## (undated) DEVICE — SUTURE NONABSORBABLE MONOFILAMENT 4-0 FS-2 18 IN ETHILON 662H

## (undated) DEVICE — RELOAD STPL SZ 0 L45MM DIA3.5MM 0DEG STD REG TISS BLU TI

## (undated) DEVICE — SYRINGE, LUER LOCK, 10ML: Brand: MEDLINE

## (undated) DEVICE — TOWEL,OR,DSP,ST,BLUE,STD,6/PK,12PK/CS: Brand: MEDLINE

## (undated) DEVICE — ELECTRODE ES AD CRDLSS PT RET REM POLYHESIVE

## (undated) DEVICE — STAPLER SKIN H3.9MM WIRE DIA0.58MM CRWN 6.9MM 35 STPL ROT

## (undated) DEVICE — TROCAR ENDOSCP L100MM DIA11MM DIL TIP STBL SL DISP ENDOPATH

## (undated) DEVICE — PADDING,UNDERCAST,COTTON, 3X4YD STERILE: Brand: MEDLINE

## (undated) DEVICE — MARKER SURG SKIN UTIL REGULAR/FINE 2 TIP W/ RUL AND 9 LBL

## (undated) DEVICE — CHLORAPREP 26ML ORANGE

## (undated) DEVICE — YANKAUER,FLEXIBLE HANDLE,REGLR CAPACITY: Brand: MEDLINE INDUSTRIES, INC.

## (undated) DEVICE — SPONGE GZ W4XL8IN COT WVN 12 PLY

## (undated) DEVICE — SLING ARM XL L20IN D75IN WHT POLY MESH ENVELOP MTL SIDE

## (undated) DEVICE — SUTURE MCRYL SZ 3-0 L27IN ABSRB VLT L17MM RB-1 1/2 CIR Y305H

## (undated) DEVICE — SYRINGE IRRIG 60ML SFT PLIABLE BLB EZ TO GRP 1 HND USE W/

## (undated) DEVICE — BLADE CLIPPER GEN PURP NS

## (undated) DEVICE — 34" SINGLE PATIENT USE HOVERMATT BREATHABLE: Brand: SINGLE PATIENT USE HOVERMATT

## (undated) DEVICE — ANESTHESIA CIRCUIT ADULT-LF: Brand: MEDLINE INDUSTRIES, INC.

## (undated) DEVICE — PACK SURG LAP CHOLE

## (undated) DEVICE — DRAPE,U/ SHT,SPLIT,PLAS,STERIL: Brand: MEDLINE

## (undated) DEVICE — PADDING,UNDERCAST,COTTON, 4"X4YD STERILE: Brand: MEDLINE

## (undated) DEVICE — TROCAR ENDOSCP L110MM DIA5-11MM STD OPT SMOOTH RADLUC

## (undated) DEVICE — BAG SPEC REM 224ML W4XL6IN DIA10MM 1 HND GYN DISP ENDOPCH

## (undated) DEVICE — APPLICATOR MEDICATED 26 CC SOLUTION HI LT ORNG CHLORAPREP

## (undated) DEVICE — GLOVE SURG SZ 65 CRM LTX FREE POLYISOPRENE POLYMER BEAD ANTI

## (undated) DEVICE — SLING ARM M L1375IN D75IN WHT POLY MESH ENVELOP MTL SIDE

## (undated) DEVICE — BANDAGE COMPR W3INXL5YD WHT BGE POLY COT M E WRP WV HK AND

## (undated) DEVICE — INTENDED FOR TISSUE SEPARATION, AND OTHER PROCEDURES THAT REQUIRE A SHARP SURGICAL BLADE TO PUNCTURE OR CUT.: Brand: BARD-PARKER ® STAINLESS STEEL BLADES

## (undated) DEVICE — CORD ES L15FT PT RET REUSE VALLEYLAB REM

## (undated) DEVICE — 1010 S-DRAPE TOWEL DRAPE 10/BX: Brand: STERI-DRAPE™

## (undated) DEVICE — NEEDLE HYPO 23GA L1.5IN TURQ POLYPR HUB S STL THN WALL IM

## (undated) DEVICE — CONMED ACCESSORY ELECTRODE, NEEDLE WITH EXTENDED INSULATION: Brand: CONMED

## (undated) DEVICE — STRIP,CLOSURE,WOUND,MEDI-STRIP,1/2X4: Brand: MEDLINE

## (undated) DEVICE — SET TBNG DISP TIP FOR AHTO

## (undated) DEVICE — BANDAGE GZ W2XL75IN ST RAYON POLY CNFRM STRTCH LTWT

## (undated) DEVICE — LOOP VES W25MM THK1MM MAXI RED SIL FLD REPELLENT 100 PER

## (undated) DEVICE — LINER,SEMI-RIGID,3000CC,50EA/CS: Brand: MEDLINE

## (undated) DEVICE — SEALER LAP L37CM SHFT DIA10MM TISS FUS HAND/FOOT SWCH BLNT

## (undated) DEVICE — TOTAL TRAY, DB, 100% SILI FOLEY, 16FR 10: Brand: MEDLINE

## (undated) DEVICE — SUTURE ABSORBABLE 3-0 PS-1 18 IN UD MONOCRYL + STRATAFIX SXMP1B102

## (undated) DEVICE — ALCOHOL RUBBING ISO 16OZ 70%

## (undated) DEVICE — TROCAR ENDOSCP SHFT L100MM DIA5MM DIL TIP ENDOPATH XCEL

## (undated) DEVICE — GLOVE SURG SZ 65 L12IN FNGR THK87MIL WHT LTX FREE

## (undated) DEVICE — TUBING INSUFFLATOR HEAT HI FLO SET PNEUMOCLEAR

## (undated) DEVICE — LINER SUCT CANSTR 1500CC SEMI RIG W/ POR HYDROPHOBIC SHUT

## (undated) DEVICE — SOLUTION IV IRRIG POUR BRL 0.9% SODIUM CHL 2F7124

## (undated) DEVICE — TISSUE RETRIEVAL SYSTEM: Brand: INZII RETRIEVAL SYSTEM

## (undated) DEVICE — STERILE LATEX POWDER-FREE SURGICAL GLOVESWITH NITRILE COATING: Brand: PROTEXIS